# Patient Record
Sex: MALE | Race: WHITE | Employment: OTHER | URBAN - NONMETROPOLITAN AREA
[De-identification: names, ages, dates, MRNs, and addresses within clinical notes are randomized per-mention and may not be internally consistent; named-entity substitution may affect disease eponyms.]

---

## 2017-06-05 ENCOUNTER — HOSPITAL ENCOUNTER (INPATIENT)
Age: 57
LOS: 4 days | Discharge: HOME | DRG: 885 | End: 2017-06-09
Attending: EMERGENCY MEDICINE | Admitting: PSYCHIATRY & NEUROLOGY
Payer: MEDICARE

## 2017-06-05 DIAGNOSIS — R45.851 DEPRESSION WITH SUICIDAL IDEATION: Primary | ICD-10-CM

## 2017-06-05 DIAGNOSIS — F32.A DEPRESSION WITH SUICIDAL IDEATION: Primary | ICD-10-CM

## 2017-06-05 DIAGNOSIS — G89.29 OTHER CHRONIC PAIN: ICD-10-CM

## 2017-06-05 DIAGNOSIS — F10.10 ALCOHOL ABUSE: ICD-10-CM

## 2017-06-05 DIAGNOSIS — D72.829 LEUKOCYTOSIS, UNSPECIFIED TYPE: ICD-10-CM

## 2017-06-05 LAB
ALBUMIN SERPL-MCNC: 4.8 G/DL (ref 3.5–5.2)
ALP BLD-CCNC: 114 U/L (ref 40–130)
ALT SERPL-CCNC: 23 U/L (ref 5–41)
AMPHETAMINE SCREEN, URINE: NEGATIVE
ANION GAP SERPL CALCULATED.3IONS-SCNC: 19 MMOL/L (ref 7–19)
AST SERPL-CCNC: 25 U/L (ref 5–40)
BARBITURATE SCREEN URINE: NEGATIVE
BASOPHILS ABSOLUTE: 0.1 K/UL (ref 0–0.2)
BASOPHILS RELATIVE PERCENT: 0.3 % (ref 0–1)
BENZODIAZEPINE SCREEN, URINE: NEGATIVE
BILIRUB SERPL-MCNC: 0.3 MG/DL (ref 0.2–1.2)
BILIRUBIN URINE: ABNORMAL
BLOOD, URINE: NEGATIVE
BUN BLDV-MCNC: 17 MG/DL (ref 6–20)
CALCIUM SERPL-MCNC: 8.6 MG/DL (ref 8.6–10)
CANNABINOID SCREEN URINE: NEGATIVE
CASTS UA: ABNORMAL /LPF
CHLORIDE BLD-SCNC: 93 MMOL/L (ref 98–111)
CLARITY: CLEAR
CO2: 22 MMOL/L (ref 22–29)
COCAINE METABOLITE SCREEN URINE: NEGATIVE
COLOR: ABNORMAL
CREAT SERPL-MCNC: 1.1 MG/DL (ref 0.5–1.2)
EOSINOPHILS ABSOLUTE: 0 K/UL (ref 0–0.6)
EOSINOPHILS RELATIVE PERCENT: 0 % (ref 0–5)
EPITHELIAL CELLS, UA: ABNORMAL /HPF
ETHANOL: <10 MG/DL (ref 0–0.08)
GFR NON-AFRICAN AMERICAN: >60
GLUCOSE BLD-MCNC: 114 MG/DL (ref 74–109)
GLUCOSE URINE: NEGATIVE MG/DL
HCT VFR BLD CALC: 33.6 % (ref 42–52)
HEMOGLOBIN: 9.7 G/DL (ref 14–18)
KETONES, URINE: ABNORMAL MG/DL
LEUKOCYTE ESTERASE, URINE: NEGATIVE
LYMPHOCYTES ABSOLUTE: 2.6 K/UL (ref 1.1–4.5)
LYMPHOCYTES RELATIVE PERCENT: 17.3 % (ref 20–40)
Lab: ABNORMAL
MCH RBC QN AUTO: 19.5 PG (ref 27–31)
MCHC RBC AUTO-ENTMCNC: 28.9 G/DL (ref 33–37)
MCV RBC AUTO: 67.5 FL (ref 80–94)
MONOCYTES ABSOLUTE: 0.9 K/UL (ref 0–0.9)
MONOCYTES RELATIVE PERCENT: 6.2 % (ref 0–10)
NEUTROPHILS ABSOLUTE: 11.1 K/UL (ref 1.5–7.5)
NEUTROPHILS RELATIVE PERCENT: 75.2 % (ref 50–65)
NITRITE, URINE: NEGATIVE
OPIATE SCREEN URINE: POSITIVE
PDW BLD-RTO: 19.5 % (ref 11.5–14.5)
PH UA: 5.5
PLATELET # BLD: 566 K/UL (ref 130–400)
PMV BLD AUTO: 9.4 FL (ref 9.4–12.4)
POTASSIUM SERPL-SCNC: 4.3 MMOL/L (ref 3.5–5)
PROTEIN UA: 100 MG/DL
RBC # BLD: 4.98 M/UL (ref 4.7–6.1)
RBC UA: ABNORMAL /HPF (ref 0–2)
SODIUM BLD-SCNC: 134 MMOL/L (ref 136–145)
SPECIFIC GRAVITY UA: 1.04
TOTAL PROTEIN: 8.9 G/DL (ref 6.6–8.7)
UROBILINOGEN, URINE: 0.2 E.U./DL
WBC # BLD: 14.8 K/UL (ref 4.8–10.8)
WBC UA: ABNORMAL /HPF (ref 0–5)

## 2017-06-05 PROCEDURE — 80053 COMPREHEN METABOLIC PANEL: CPT

## 2017-06-05 PROCEDURE — G0480 DRUG TEST DEF 1-7 CLASSES: HCPCS

## 2017-06-05 PROCEDURE — 99284 EMERGENCY DEPT VISIT MOD MDM: CPT | Performed by: EMERGENCY MEDICINE

## 2017-06-05 PROCEDURE — 85025 COMPLETE CBC W/AUTO DIFF WBC: CPT

## 2017-06-05 PROCEDURE — 1240000000 HC EMOTIONAL WELLNESS R&B

## 2017-06-05 PROCEDURE — 6370000000 HC RX 637 (ALT 250 FOR IP): Performed by: PSYCHIATRY & NEUROLOGY

## 2017-06-05 PROCEDURE — 80307 DRUG TEST PRSMV CHEM ANLYZR: CPT

## 2017-06-05 PROCEDURE — 81001 URINALYSIS AUTO W/SCOPE: CPT

## 2017-06-05 PROCEDURE — 99285 EMERGENCY DEPT VISIT HI MDM: CPT

## 2017-06-05 PROCEDURE — 6370000000 HC RX 637 (ALT 250 FOR IP): Performed by: EMERGENCY MEDICINE

## 2017-06-05 PROCEDURE — 36415 COLL VENOUS BLD VENIPUNCTURE: CPT

## 2017-06-05 RX ORDER — HYDROCODONE BITARTRATE AND ACETAMINOPHEN 5; 325 MG/1; MG/1
2 TABLET ORAL ONCE
Status: COMPLETED | OUTPATIENT
Start: 2017-06-05 | End: 2017-06-05

## 2017-06-05 RX ORDER — CHLORDIAZEPOXIDE HYDROCHLORIDE 25 MG/1
25 CAPSULE, GELATIN COATED ORAL ONCE
Status: COMPLETED | OUTPATIENT
Start: 2017-06-05 | End: 2017-06-05

## 2017-06-05 RX ORDER — GABAPENTIN 300 MG/1
300 CAPSULE ORAL 3 TIMES DAILY
Status: ON HOLD | COMMUNITY
End: 2017-12-27 | Stop reason: HOSPADM

## 2017-06-05 RX ORDER — CLONIDINE HYDROCHLORIDE 0.1 MG/1
0.1 TABLET ORAL ONCE
Status: COMPLETED | OUTPATIENT
Start: 2017-06-05 | End: 2017-06-05

## 2017-06-05 RX ORDER — AMITRIPTYLINE HYDROCHLORIDE 10 MG/1
10 TABLET, FILM COATED ORAL 3 TIMES DAILY
Status: ON HOLD | COMMUNITY
End: 2017-12-27 | Stop reason: HOSPADM

## 2017-06-05 RX ORDER — BUSPIRONE HYDROCHLORIDE 15 MG/1
15 TABLET ORAL 2 TIMES DAILY
Status: DISCONTINUED | OUTPATIENT
Start: 2017-06-05 | End: 2017-06-07

## 2017-06-05 RX ORDER — PANTOPRAZOLE SODIUM 40 MG/1
40 TABLET, DELAYED RELEASE ORAL DAILY
Status: DISCONTINUED | OUTPATIENT
Start: 2017-06-06 | End: 2017-06-09 | Stop reason: HOSPADM

## 2017-06-05 RX ORDER — AMITRIPTYLINE HYDROCHLORIDE 10 MG/1
10 TABLET, FILM COATED ORAL 3 TIMES DAILY
Status: DISCONTINUED | OUTPATIENT
Start: 2017-06-05 | End: 2017-06-09 | Stop reason: HOSPADM

## 2017-06-05 RX ORDER — TEMAZEPAM 15 MG/1
15 CAPSULE ORAL NIGHTLY PRN
Status: ON HOLD | COMMUNITY
End: 2017-06-09 | Stop reason: HOSPADM

## 2017-06-05 RX ORDER — ALPRAZOLAM 1 MG/1
0.5 TABLET ORAL 4 TIMES DAILY PRN
Status: ON HOLD | COMMUNITY
End: 2017-08-22 | Stop reason: HOSPADM

## 2017-06-05 RX ORDER — ACETAMINOPHEN 325 MG/1
650 TABLET ORAL EVERY 4 HOURS PRN
Status: DISCONTINUED | OUTPATIENT
Start: 2017-06-05 | End: 2017-06-09 | Stop reason: HOSPADM

## 2017-06-05 RX ORDER — LORAZEPAM 1 MG/1
1 TABLET ORAL ONCE
Status: COMPLETED | OUTPATIENT
Start: 2017-06-05 | End: 2017-06-05

## 2017-06-05 RX ORDER — HYDROCODONE BITARTRATE AND ACETAMINOPHEN 10; 325 MG/1; MG/1
1 TABLET ORAL 3 TIMES DAILY PRN
Status: ON HOLD | COMMUNITY
End: 2017-08-22 | Stop reason: HOSPADM

## 2017-06-05 RX ORDER — LISINOPRIL 10 MG/1
40 TABLET ORAL ONCE
Status: DISCONTINUED | OUTPATIENT
Start: 2017-06-05 | End: 2017-06-05

## 2017-06-05 RX ORDER — NICOTINE 21 MG/24HR
1 PATCH, TRANSDERMAL 24 HOURS TRANSDERMAL DAILY
Status: DISCONTINUED | OUTPATIENT
Start: 2017-06-05 | End: 2017-06-09 | Stop reason: HOSPADM

## 2017-06-05 RX ORDER — GABAPENTIN 300 MG/1
300 CAPSULE ORAL 3 TIMES DAILY
Status: DISCONTINUED | OUTPATIENT
Start: 2017-06-05 | End: 2017-06-09 | Stop reason: HOSPADM

## 2017-06-05 RX ORDER — IBUPROFEN 600 MG/1
600 TABLET ORAL 3 TIMES DAILY
Status: DISCONTINUED | OUTPATIENT
Start: 2017-06-05 | End: 2017-06-06

## 2017-06-05 RX ORDER — HYDROCODONE BITARTRATE AND ACETAMINOPHEN 5; 325 MG/1; MG/1
1 TABLET ORAL 3 TIMES DAILY PRN
Status: DISCONTINUED | OUTPATIENT
Start: 2017-06-05 | End: 2017-06-06

## 2017-06-05 RX ORDER — DULOXETIN HYDROCHLORIDE 60 MG/1
60 CAPSULE, DELAYED RELEASE ORAL 2 TIMES DAILY
Status: DISCONTINUED | OUTPATIENT
Start: 2017-06-05 | End: 2017-06-09 | Stop reason: HOSPADM

## 2017-06-05 RX ORDER — CHLORDIAZEPOXIDE HYDROCHLORIDE 25 MG/1
50 CAPSULE, GELATIN COATED ORAL 4 TIMES DAILY
Status: DISCONTINUED | OUTPATIENT
Start: 2017-06-05 | End: 2017-06-06

## 2017-06-05 RX ORDER — LISINOPRIL 20 MG/1
40 TABLET ORAL DAILY
Status: DISCONTINUED | OUTPATIENT
Start: 2017-06-06 | End: 2017-06-09 | Stop reason: HOSPADM

## 2017-06-05 RX ORDER — ALPRAZOLAM 0.5 MG/1
1 TABLET ORAL 3 TIMES DAILY PRN
Status: DISCONTINUED | OUTPATIENT
Start: 2017-06-05 | End: 2017-06-06

## 2017-06-05 RX ADMIN — CHLORDIAZEPOXIDE HYDROCHLORIDE 25 MG: 25 CAPSULE ORAL at 12:19

## 2017-06-05 RX ADMIN — CHLORDIAZEPOXIDE HYDROCHLORIDE 50 MG: 25 CAPSULE ORAL at 21:25

## 2017-06-05 RX ADMIN — IBUPROFEN 600 MG: 600 TABLET, FILM COATED ORAL at 21:25

## 2017-06-05 RX ADMIN — DULOXETINE HYDROCHLORIDE 60 MG: 60 CAPSULE, DELAYED RELEASE ORAL at 21:25

## 2017-06-05 RX ADMIN — LORAZEPAM 1 MG: 1 TABLET ORAL at 14:52

## 2017-06-05 RX ADMIN — CLONIDINE HYDROCHLORIDE 0.1 MG: 0.1 TABLET ORAL at 12:19

## 2017-06-05 RX ADMIN — AMITRIPTYLINE HYDROCHLORIDE 10 MG: 10 TABLET, FILM COATED ORAL at 21:25

## 2017-06-05 RX ADMIN — QUETIAPINE FUMARATE 300 MG: 200 TABLET, FILM COATED ORAL at 21:26

## 2017-06-05 RX ADMIN — HYDROCODONE BITARTRATE AND ACETAMINOPHEN 2 TABLET: 5; 325 TABLET ORAL at 13:28

## 2017-06-05 RX ADMIN — BUSPIRONE HYDROCHLORIDE 15 MG: 15 TABLET ORAL at 21:25

## 2017-06-05 RX ADMIN — CLONIDINE HYDROCHLORIDE 0.1 MG: 0.1 TABLET ORAL at 14:11

## 2017-06-05 RX ADMIN — GABAPENTIN 300 MG: 300 CAPSULE ORAL at 21:26

## 2017-06-05 ASSESSMENT — SLEEP AND FATIGUE QUESTIONNAIRES
DO YOU USE A SLEEP AID: YES
AVERAGE NUMBER OF SLEEP HOURS: 2
DIFFICULTY ARISING: NO
DIFFICULTY STAYING ASLEEP: YES
DIFFICULTY FALLING ASLEEP: YES
DO YOU HAVE DIFFICULTY SLEEPING: YES
RESTFUL SLEEP: NO
SLEEP PATTERN: DIFFICULTY FALLING ASLEEP;DISTURBED/INTERRUPTED SLEEP;INSOMNIA;RESTLESSNESS

## 2017-06-05 ASSESSMENT — PATIENT HEALTH QUESTIONNAIRE - PHQ9: SUM OF ALL RESPONSES TO PHQ QUESTIONS 1-9: 16

## 2017-06-05 ASSESSMENT — PAIN DESCRIPTION - LOCATION: LOCATION: NECK

## 2017-06-05 ASSESSMENT — PAIN SCALES - GENERAL
PAINLEVEL_OUTOF10: 7
PAINLEVEL_OUTOF10: 8

## 2017-06-05 ASSESSMENT — ENCOUNTER SYMPTOMS
ABDOMINAL PAIN: 0
COUGH: 0

## 2017-06-05 ASSESSMENT — LIFESTYLE VARIABLES: HISTORY_ALCOHOL_USE: YES

## 2017-06-06 PROBLEM — F19.20 POLYSUBSTANCE (EXCLUDING OPIOIDS) DEPENDENCE, DAILY USE (HCC): Status: ACTIVE | Noted: 2017-06-06

## 2017-06-06 PROBLEM — G89.4 CHRONIC PAIN ASSOCIATED WITH SIGNIFICANT PSYCHOSOCIAL DYSFUNCTION: Status: ACTIVE | Noted: 2017-06-06

## 2017-06-06 PROBLEM — F32.2 MAJOR DEPRESS DIS, SEVERE: Status: ACTIVE | Noted: 2017-06-06

## 2017-06-06 PROCEDURE — 90792 PSYCH DIAG EVAL W/MED SRVCS: CPT | Performed by: PSYCHIATRY & NEUROLOGY

## 2017-06-06 PROCEDURE — 1240000000 HC EMOTIONAL WELLNESS R&B

## 2017-06-06 PROCEDURE — 6370000000 HC RX 637 (ALT 250 FOR IP): Performed by: PSYCHIATRY & NEUROLOGY

## 2017-06-06 RX ORDER — IBUPROFEN 600 MG/1
600 TABLET ORAL EVERY 8 HOURS PRN
Status: DISCONTINUED | OUTPATIENT
Start: 2017-06-06 | End: 2017-06-07

## 2017-06-06 RX ORDER — HYDROCODONE BITARTRATE AND ACETAMINOPHEN 5; 325 MG/1; MG/1
1 TABLET ORAL 3 TIMES DAILY
Status: DISCONTINUED | OUTPATIENT
Start: 2017-06-07 | End: 2017-06-09 | Stop reason: HOSPADM

## 2017-06-06 RX ORDER — CHLORDIAZEPOXIDE HYDROCHLORIDE 25 MG/1
50 CAPSULE, GELATIN COATED ORAL 3 TIMES DAILY
Status: DISCONTINUED | OUTPATIENT
Start: 2017-06-06 | End: 2017-06-07

## 2017-06-06 RX ADMIN — CHLORDIAZEPOXIDE HYDROCHLORIDE 50 MG: 25 CAPSULE ORAL at 09:23

## 2017-06-06 RX ADMIN — DULOXETINE HYDROCHLORIDE 60 MG: 60 CAPSULE, DELAYED RELEASE ORAL at 21:33

## 2017-06-06 RX ADMIN — ALPRAZOLAM 1 MG: 0.5 TABLET ORAL at 18:05

## 2017-06-06 RX ADMIN — ALPRAZOLAM 1 MG: 0.5 TABLET ORAL at 09:23

## 2017-06-06 RX ADMIN — BUSPIRONE HYDROCHLORIDE 15 MG: 15 TABLET ORAL at 21:33

## 2017-06-06 RX ADMIN — HYDROCODONE BITARTRATE AND ACETAMINOPHEN 1 TABLET: 5; 325 TABLET ORAL at 17:00

## 2017-06-06 RX ADMIN — GABAPENTIN 300 MG: 300 CAPSULE ORAL at 09:22

## 2017-06-06 RX ADMIN — AMITRIPTYLINE HYDROCHLORIDE 10 MG: 10 TABLET, FILM COATED ORAL at 13:44

## 2017-06-06 RX ADMIN — CHLORDIAZEPOXIDE HYDROCHLORIDE 50 MG: 25 CAPSULE ORAL at 16:04

## 2017-06-06 RX ADMIN — IBUPROFEN 600 MG: 600 TABLET, FILM COATED ORAL at 21:33

## 2017-06-06 RX ADMIN — AMITRIPTYLINE HYDROCHLORIDE 10 MG: 10 TABLET, FILM COATED ORAL at 21:33

## 2017-06-06 RX ADMIN — DULOXETINE HYDROCHLORIDE 60 MG: 60 CAPSULE, DELAYED RELEASE ORAL at 09:22

## 2017-06-06 RX ADMIN — LISINOPRIL 40 MG: 20 TABLET ORAL at 09:22

## 2017-06-06 RX ADMIN — GABAPENTIN 300 MG: 300 CAPSULE ORAL at 13:44

## 2017-06-06 RX ADMIN — BUSPIRONE HYDROCHLORIDE 15 MG: 15 TABLET ORAL at 09:22

## 2017-06-06 RX ADMIN — CHLORDIAZEPOXIDE HYDROCHLORIDE 50 MG: 25 CAPSULE ORAL at 13:44

## 2017-06-06 RX ADMIN — HYDROCODONE BITARTRATE AND ACETAMINOPHEN 1 TABLET: 5; 325 TABLET ORAL at 09:22

## 2017-06-06 RX ADMIN — ACETAMINOPHEN 650 MG: 325 TABLET, FILM COATED ORAL at 07:19

## 2017-06-06 RX ADMIN — GABAPENTIN 300 MG: 300 CAPSULE ORAL at 21:33

## 2017-06-06 RX ADMIN — AMITRIPTYLINE HYDROCHLORIDE 10 MG: 10 TABLET, FILM COATED ORAL at 09:22

## 2017-06-06 RX ADMIN — QUETIAPINE FUMARATE 300 MG: 200 TABLET, FILM COATED ORAL at 21:33

## 2017-06-06 RX ADMIN — PANTOPRAZOLE SODIUM 40 MG: 40 TABLET, DELAYED RELEASE ORAL at 09:22

## 2017-06-06 RX ADMIN — IBUPROFEN 600 MG: 600 TABLET, FILM COATED ORAL at 09:23

## 2017-06-06 RX ADMIN — IBUPROFEN 600 MG: 600 TABLET, FILM COATED ORAL at 13:44

## 2017-06-06 ASSESSMENT — PAIN SCALES - GENERAL
PAINLEVEL_OUTOF10: 7
PAINLEVEL_OUTOF10: 8
PAINLEVEL_OUTOF10: 7
PAINLEVEL_OUTOF10: 6
PAINLEVEL_OUTOF10: 7
PAINLEVEL_OUTOF10: 7
PAINLEVEL_OUTOF10: 3
PAINLEVEL_OUTOF10: 6
PAINLEVEL_OUTOF10: 8

## 2017-06-07 LAB
BASOPHILS ABSOLUTE: 0 K/UL (ref 0–0.2)
BASOPHILS RELATIVE PERCENT: 0.4 % (ref 0–1)
EOSINOPHILS ABSOLUTE: 0.1 K/UL (ref 0–0.6)
EOSINOPHILS RELATIVE PERCENT: 1.4 % (ref 0–5)
FERRITIN: 9.9 NG/ML (ref 30–400)
HBA1C MFR BLD: 6.3 %
HCT VFR BLD CALC: 29.8 % (ref 42–52)
HCT VFR BLD CALC: 30.3 % (ref 42–52)
HEMOGLOBIN: 8.4 G/DL (ref 14–18)
HEMOGLOBIN: 8.4 G/DL (ref 14–18)
IRON SATURATION: 7 % (ref 14–50)
IRON: 35 UG/DL (ref 59–158)
LYMPHOCYTES ABSOLUTE: 2.1 K/UL (ref 1.1–4.5)
LYMPHOCYTES RELATIVE PERCENT: 21.2 % (ref 20–40)
MCH RBC QN AUTO: 19.7 PG (ref 27–31)
MCHC RBC AUTO-ENTMCNC: 28.2 G/DL (ref 33–37)
MCV RBC AUTO: 69.8 FL (ref 80–94)
MONOCYTES ABSOLUTE: 0.8 K/UL (ref 0–0.9)
MONOCYTES RELATIVE PERCENT: 7.5 % (ref 0–10)
NEUTROPHILS ABSOLUTE: 6.9 K/UL (ref 1.5–7.5)
NEUTROPHILS RELATIVE PERCENT: 69 % (ref 50–65)
PDW BLD-RTO: 18.5 % (ref 11.5–14.5)
PLATELET # BLD: 324 K/UL (ref 130–400)
PMV BLD AUTO: 9.9 FL (ref 9.4–12.4)
RBC # BLD: 4.27 M/UL (ref 4.7–6.1)
RETICULOCYTE ABSOLUTE COUNT: 0.1 M/UL (ref 0.03–0.12)
RETICULOCYTE COUNT PCT: 2.29 % (ref 0.5–1.5)
TOTAL IRON BINDING CAPACITY: 489 UG/DL (ref 250–400)
VITAMIN B-12: 277 PG/ML (ref 211–946)
VITAMIN D 25-HYDROXY: 19.5 NG/ML
WBC # BLD: 10 K/UL (ref 4.8–10.8)

## 2017-06-07 PROCEDURE — 83036 HEMOGLOBIN GLYCOSYLATED A1C: CPT

## 2017-06-07 PROCEDURE — 1240000000 HC EMOTIONAL WELLNESS R&B

## 2017-06-07 PROCEDURE — 82306 VITAMIN D 25 HYDROXY: CPT

## 2017-06-07 PROCEDURE — 83540 ASSAY OF IRON: CPT

## 2017-06-07 PROCEDURE — 85025 COMPLETE CBC W/AUTO DIFF WBC: CPT

## 2017-06-07 PROCEDURE — 82607 VITAMIN B-12: CPT

## 2017-06-07 PROCEDURE — 83550 IRON BINDING TEST: CPT

## 2017-06-07 PROCEDURE — 6370000000 HC RX 637 (ALT 250 FOR IP): Performed by: PSYCHIATRY & NEUROLOGY

## 2017-06-07 PROCEDURE — 99233 SBSQ HOSP IP/OBS HIGH 50: CPT | Performed by: PSYCHIATRY & NEUROLOGY

## 2017-06-07 PROCEDURE — 6370000000 HC RX 637 (ALT 250 FOR IP): Performed by: FAMILY MEDICINE

## 2017-06-07 PROCEDURE — 85018 HEMOGLOBIN: CPT

## 2017-06-07 PROCEDURE — 85014 HEMATOCRIT: CPT

## 2017-06-07 PROCEDURE — 82728 ASSAY OF FERRITIN: CPT

## 2017-06-07 PROCEDURE — 36415 COLL VENOUS BLD VENIPUNCTURE: CPT

## 2017-06-07 PROCEDURE — 85045 AUTOMATED RETICULOCYTE COUNT: CPT

## 2017-06-07 RX ORDER — ERGOCALCIFEROL 1.25 MG/1
50000 CAPSULE ORAL WEEKLY
Status: DISCONTINUED | OUTPATIENT
Start: 2017-06-07 | End: 2017-06-09 | Stop reason: HOSPADM

## 2017-06-07 RX ORDER — ALPRAZOLAM 0.5 MG/1
1 TABLET ORAL 3 TIMES DAILY
Status: DISCONTINUED | OUTPATIENT
Start: 2017-06-07 | End: 2017-06-09 | Stop reason: HOSPADM

## 2017-06-07 RX ORDER — FERROUS SULFATE 325(65) MG
325 TABLET ORAL 2 TIMES DAILY WITH MEALS
Status: DISCONTINUED | OUTPATIENT
Start: 2017-06-07 | End: 2017-06-09 | Stop reason: HOSPADM

## 2017-06-07 RX ADMIN — GABAPENTIN 300 MG: 300 CAPSULE ORAL at 09:22

## 2017-06-07 RX ADMIN — GABAPENTIN 300 MG: 300 CAPSULE ORAL at 13:37

## 2017-06-07 RX ADMIN — FERROUS SULFATE TAB 325 MG (65 MG ELEMENTAL FE) 325 MG: 325 (65 FE) TAB at 10:42

## 2017-06-07 RX ADMIN — PANTOPRAZOLE SODIUM 40 MG: 40 TABLET, DELAYED RELEASE ORAL at 09:22

## 2017-06-07 RX ADMIN — ACETAMINOPHEN 650 MG: 325 TABLET, FILM COATED ORAL at 06:32

## 2017-06-07 RX ADMIN — QUETIAPINE FUMARATE 300 MG: 200 TABLET, FILM COATED ORAL at 21:36

## 2017-06-07 RX ADMIN — ACETAMINOPHEN 650 MG: 325 TABLET, FILM COATED ORAL at 16:57

## 2017-06-07 RX ADMIN — LISINOPRIL 40 MG: 20 TABLET ORAL at 09:21

## 2017-06-07 RX ADMIN — CHLORDIAZEPOXIDE HYDROCHLORIDE 50 MG: 25 CAPSULE ORAL at 09:21

## 2017-06-07 RX ADMIN — AMITRIPTYLINE HYDROCHLORIDE 10 MG: 10 TABLET, FILM COATED ORAL at 21:37

## 2017-06-07 RX ADMIN — ALPRAZOLAM 1 MG: 0.5 TABLET ORAL at 16:33

## 2017-06-07 RX ADMIN — GABAPENTIN 300 MG: 300 CAPSULE ORAL at 21:37

## 2017-06-07 RX ADMIN — AMITRIPTYLINE HYDROCHLORIDE 10 MG: 10 TABLET, FILM COATED ORAL at 13:36

## 2017-06-07 RX ADMIN — ALPRAZOLAM 1 MG: 0.5 TABLET ORAL at 21:36

## 2017-06-07 RX ADMIN — BUSPIRONE HYDROCHLORIDE 15 MG: 15 TABLET ORAL at 09:22

## 2017-06-07 RX ADMIN — DULOXETINE HYDROCHLORIDE 60 MG: 60 CAPSULE, DELAYED RELEASE ORAL at 21:37

## 2017-06-07 RX ADMIN — HYDROCODONE BITARTRATE AND ACETAMINOPHEN 1 TABLET: 5; 325 TABLET ORAL at 09:22

## 2017-06-07 RX ADMIN — AMITRIPTYLINE HYDROCHLORIDE 10 MG: 10 TABLET, FILM COATED ORAL at 09:22

## 2017-06-07 RX ADMIN — FERROUS SULFATE TAB 325 MG (65 MG ELEMENTAL FE) 325 MG: 325 (65 FE) TAB at 16:33

## 2017-06-07 RX ADMIN — DULOXETINE HYDROCHLORIDE 60 MG: 60 CAPSULE, DELAYED RELEASE ORAL at 09:22

## 2017-06-07 RX ADMIN — HYDROCODONE BITARTRATE AND ACETAMINOPHEN 1 TABLET: 5; 325 TABLET ORAL at 21:36

## 2017-06-07 RX ADMIN — ERGOCALCIFEROL 50000 UNITS: 1.25 CAPSULE ORAL at 10:42

## 2017-06-07 RX ADMIN — HYDROCODONE BITARTRATE AND ACETAMINOPHEN 1 TABLET: 5; 325 TABLET ORAL at 13:36

## 2017-06-07 ASSESSMENT — PAIN SCALES - GENERAL
PAINLEVEL_OUTOF10: 5
PAINLEVEL_OUTOF10: 5
PAINLEVEL_OUTOF10: 7
PAINLEVEL_OUTOF10: 7
PAINLEVEL_OUTOF10: 8
PAINLEVEL_OUTOF10: 7
PAINLEVEL_OUTOF10: 8
PAINLEVEL_OUTOF10: 7

## 2017-06-08 LAB
HCT VFR BLD CALC: 29.5 % (ref 42–52)
HEMOGLOBIN: 8.1 G/DL (ref 14–18)

## 2017-06-08 PROCEDURE — 6370000000 HC RX 637 (ALT 250 FOR IP): Performed by: PSYCHIATRY & NEUROLOGY

## 2017-06-08 PROCEDURE — 6370000000 HC RX 637 (ALT 250 FOR IP): Performed by: FAMILY MEDICINE

## 2017-06-08 PROCEDURE — 99231 SBSQ HOSP IP/OBS SF/LOW 25: CPT | Performed by: PSYCHIATRY & NEUROLOGY

## 2017-06-08 PROCEDURE — 85014 HEMATOCRIT: CPT

## 2017-06-08 PROCEDURE — 1240000000 HC EMOTIONAL WELLNESS R&B

## 2017-06-08 PROCEDURE — 85018 HEMOGLOBIN: CPT

## 2017-06-08 PROCEDURE — 36415 COLL VENOUS BLD VENIPUNCTURE: CPT

## 2017-06-08 RX ORDER — CALCIUM CARBONATE 200(500)MG
500 TABLET,CHEWABLE ORAL 3 TIMES DAILY PRN
Status: DISCONTINUED | OUTPATIENT
Start: 2017-06-08 | End: 2017-06-09 | Stop reason: HOSPADM

## 2017-06-08 RX ORDER — MAGNESIUM HYDROXIDE/ALUMINUM HYDROXICE/SIMETHICONE 120; 1200; 1200 MG/30ML; MG/30ML; MG/30ML
30 SUSPENSION ORAL EVERY 6 HOURS PRN
Status: DISCONTINUED | OUTPATIENT
Start: 2017-06-08 | End: 2017-06-09 | Stop reason: HOSPADM

## 2017-06-08 RX ADMIN — HYDROCODONE BITARTRATE AND ACETAMINOPHEN 1 TABLET: 5; 325 TABLET ORAL at 20:51

## 2017-06-08 RX ADMIN — GABAPENTIN 300 MG: 300 CAPSULE ORAL at 14:05

## 2017-06-08 RX ADMIN — HYDROCODONE BITARTRATE AND ACETAMINOPHEN 1 TABLET: 5; 325 TABLET ORAL at 14:05

## 2017-06-08 RX ADMIN — FERROUS SULFATE TAB 325 MG (65 MG ELEMENTAL FE) 325 MG: 325 (65 FE) TAB at 17:26

## 2017-06-08 RX ADMIN — ALPRAZOLAM 1 MG: 0.5 TABLET ORAL at 14:05

## 2017-06-08 RX ADMIN — GABAPENTIN 300 MG: 300 CAPSULE ORAL at 20:51

## 2017-06-08 RX ADMIN — ALPRAZOLAM 1 MG: 0.5 TABLET ORAL at 09:09

## 2017-06-08 RX ADMIN — LISINOPRIL 40 MG: 20 TABLET ORAL at 09:09

## 2017-06-08 RX ADMIN — FERROUS SULFATE TAB 325 MG (65 MG ELEMENTAL FE) 325 MG: 325 (65 FE) TAB at 09:09

## 2017-06-08 RX ADMIN — CALCIUM CARBONATE (ANTACID) CHEW TAB 500 MG 500 MG: 500 CHEW TAB at 20:50

## 2017-06-08 RX ADMIN — AMITRIPTYLINE HYDROCHLORIDE 10 MG: 10 TABLET, FILM COATED ORAL at 14:05

## 2017-06-08 RX ADMIN — AMITRIPTYLINE HYDROCHLORIDE 10 MG: 10 TABLET, FILM COATED ORAL at 09:09

## 2017-06-08 RX ADMIN — ALPRAZOLAM 1 MG: 0.5 TABLET ORAL at 20:50

## 2017-06-08 RX ADMIN — ALUMINUM HYDROXIDE, MAGNESIUM HYDROXIDE, AND SIMETHICONE 30 ML: 200; 200; 20 SUSPENSION ORAL at 22:22

## 2017-06-08 RX ADMIN — PANTOPRAZOLE SODIUM 40 MG: 40 TABLET, DELAYED RELEASE ORAL at 09:09

## 2017-06-08 RX ADMIN — ACETAMINOPHEN 650 MG: 325 TABLET, FILM COATED ORAL at 07:15

## 2017-06-08 RX ADMIN — AMITRIPTYLINE HYDROCHLORIDE 10 MG: 10 TABLET, FILM COATED ORAL at 20:51

## 2017-06-08 RX ADMIN — HYDROCODONE BITARTRATE AND ACETAMINOPHEN 1 TABLET: 5; 325 TABLET ORAL at 09:09

## 2017-06-08 RX ADMIN — QUETIAPINE FUMARATE 300 MG: 200 TABLET, FILM COATED ORAL at 20:51

## 2017-06-08 RX ADMIN — DULOXETINE HYDROCHLORIDE 60 MG: 60 CAPSULE, DELAYED RELEASE ORAL at 09:09

## 2017-06-08 RX ADMIN — DULOXETINE HYDROCHLORIDE 60 MG: 60 CAPSULE, DELAYED RELEASE ORAL at 20:51

## 2017-06-08 RX ADMIN — GABAPENTIN 300 MG: 300 CAPSULE ORAL at 09:09

## 2017-06-08 RX ADMIN — ACETAMINOPHEN 650 MG: 325 TABLET, FILM COATED ORAL at 16:28

## 2017-06-08 ASSESSMENT — PAIN SCALES - GENERAL
PAINLEVEL_OUTOF10: 8
PAINLEVEL_OUTOF10: 3
PAINLEVEL_OUTOF10: 8
PAINLEVEL_OUTOF10: 5
PAINLEVEL_OUTOF10: 8
PAINLEVEL_OUTOF10: 8
PAINLEVEL_OUTOF10: 4

## 2017-06-09 VITALS
WEIGHT: 224 LBS | DIASTOLIC BLOOD PRESSURE: 73 MMHG | RESPIRATION RATE: 20 BRPM | HEIGHT: 73 IN | OXYGEN SATURATION: 98 % | TEMPERATURE: 97.8 F | BODY MASS INDEX: 29.69 KG/M2 | HEART RATE: 88 BPM | SYSTOLIC BLOOD PRESSURE: 118 MMHG

## 2017-06-09 LAB
HCT VFR BLD CALC: 31.3 % (ref 42–52)
HEMOGLOBIN: 8.7 G/DL (ref 14–18)
MCH RBC QN AUTO: 19.6 PG (ref 27–31)
MCHC RBC AUTO-ENTMCNC: 27.8 G/DL (ref 33–37)
MCV RBC AUTO: 70.7 FL (ref 80–94)
PDW BLD-RTO: 19.1 % (ref 11.5–14.5)
PLATELET # BLD: 365 K/UL (ref 130–400)
PMV BLD AUTO: 10 FL (ref 9.4–12.4)
RBC # BLD: 4.43 M/UL (ref 4.7–6.1)
WBC # BLD: 6.5 K/UL (ref 4.8–10.8)

## 2017-06-09 PROCEDURE — 36415 COLL VENOUS BLD VENIPUNCTURE: CPT

## 2017-06-09 PROCEDURE — 6370000000 HC RX 637 (ALT 250 FOR IP): Performed by: PSYCHIATRY & NEUROLOGY

## 2017-06-09 PROCEDURE — 99239 HOSP IP/OBS DSCHRG MGMT >30: CPT | Performed by: PSYCHIATRY & NEUROLOGY

## 2017-06-09 PROCEDURE — 85027 COMPLETE CBC AUTOMATED: CPT

## 2017-06-09 PROCEDURE — 6370000000 HC RX 637 (ALT 250 FOR IP): Performed by: FAMILY MEDICINE

## 2017-06-09 PROCEDURE — 5130000000 HC BRIDGE APPOINTMENT

## 2017-06-09 RX ORDER — FERROUS SULFATE 325(65) MG
325 TABLET ORAL 2 TIMES DAILY WITH MEALS
Qty: 60 TABLET | Refills: 0 | Status: SHIPPED | OUTPATIENT
Start: 2017-06-09 | End: 2017-12-26

## 2017-06-09 RX ORDER — M-VIT,TX,IRON,MINS/CALC/FOLIC 27MG-0.4MG
1 TABLET ORAL DAILY
Qty: 30 TABLET | Refills: 0 | Status: SHIPPED | OUTPATIENT
Start: 2017-06-09 | End: 2017-12-26

## 2017-06-09 RX ORDER — ERGOCALCIFEROL (VITAMIN D2) 1250 MCG
50000 CAPSULE ORAL WEEKLY
Qty: 12 CAPSULE | Refills: 0 | Status: SHIPPED | OUTPATIENT
Start: 2017-06-09 | End: 2017-12-26

## 2017-06-09 RX ORDER — VARENICLINE TARTRATE 0.5 MG/1
1 TABLET, FILM COATED ORAL 2 TIMES DAILY WITH MEALS
Status: DISCONTINUED | OUTPATIENT
Start: 2017-06-09 | End: 2017-06-09 | Stop reason: HOSPADM

## 2017-06-09 RX ORDER — VARENICLINE TARTRATE 1 MG/1
1 TABLET, FILM COATED ORAL 2 TIMES DAILY WITH MEALS
Qty: 60 TABLET | Refills: 0 | Status: SHIPPED | OUTPATIENT
Start: 2017-06-09 | End: 2017-12-26

## 2017-06-09 RX ADMIN — DULOXETINE HYDROCHLORIDE 60 MG: 60 CAPSULE, DELAYED RELEASE ORAL at 08:38

## 2017-06-09 RX ADMIN — ACETAMINOPHEN 650 MG: 325 TABLET, FILM COATED ORAL at 05:49

## 2017-06-09 RX ADMIN — ALPRAZOLAM 1 MG: 0.5 TABLET ORAL at 08:37

## 2017-06-09 RX ADMIN — AMITRIPTYLINE HYDROCHLORIDE 10 MG: 10 TABLET, FILM COATED ORAL at 08:37

## 2017-06-09 RX ADMIN — GABAPENTIN 300 MG: 300 CAPSULE ORAL at 14:05

## 2017-06-09 RX ADMIN — GABAPENTIN 300 MG: 300 CAPSULE ORAL at 08:38

## 2017-06-09 RX ADMIN — VARENICLINE TARTRATE 1 MG: 0.5 TABLET, FILM COATED ORAL at 11:55

## 2017-06-09 RX ADMIN — AMITRIPTYLINE HYDROCHLORIDE 10 MG: 10 TABLET, FILM COATED ORAL at 14:05

## 2017-06-09 RX ADMIN — ALPRAZOLAM 1 MG: 0.5 TABLET ORAL at 14:05

## 2017-06-09 RX ADMIN — LISINOPRIL 40 MG: 20 TABLET ORAL at 08:38

## 2017-06-09 RX ADMIN — PANTOPRAZOLE SODIUM 40 MG: 40 TABLET, DELAYED RELEASE ORAL at 08:37

## 2017-06-09 RX ADMIN — CALCIUM CARBONATE (ANTACID) CHEW TAB 500 MG 500 MG: 500 CHEW TAB at 08:38

## 2017-06-09 RX ADMIN — FERROUS SULFATE TAB 325 MG (65 MG ELEMENTAL FE) 325 MG: 325 (65 FE) TAB at 08:38

## 2017-06-09 RX ADMIN — HYDROCODONE BITARTRATE AND ACETAMINOPHEN 1 TABLET: 5; 325 TABLET ORAL at 08:37

## 2017-06-09 RX ADMIN — HYDROCODONE BITARTRATE AND ACETAMINOPHEN 1 TABLET: 5; 325 TABLET ORAL at 14:05

## 2017-06-09 ASSESSMENT — PAIN SCALES - GENERAL
PAINLEVEL_OUTOF10: 8
PAINLEVEL_OUTOF10: 6
PAINLEVEL_OUTOF10: 8

## 2017-06-10 ENCOUNTER — TELEPHONE (OUTPATIENT)
Dept: URGENT CARE | Facility: CLINIC | Age: 57
End: 2017-06-10

## 2017-06-10 NOTE — TELEPHONE ENCOUNTER
Patient called requesting medication and was not making sense what so ever, he was saying he couldn't pick them up because they were not ready to be filled yet I asked if he was taking more than he should he said no, he said he went to behavioral health yesterday i told him to call them and see what they can do other wise follow up with his pcp we made him an appt with or go to er per elizabeth. On his discharge that day he was seen here he had an appt with dr coronado.  He said he will call behavorial health and see if they can refill them.

## 2017-06-13 ENCOUNTER — APPOINTMENT (OUTPATIENT)
Dept: GENERAL RADIOLOGY | Facility: HOSPITAL | Age: 57
End: 2017-06-13

## 2017-06-13 ENCOUNTER — APPOINTMENT (OUTPATIENT)
Dept: CT IMAGING | Facility: HOSPITAL | Age: 57
End: 2017-06-13

## 2017-06-13 ENCOUNTER — HOSPITAL ENCOUNTER (INPATIENT)
Facility: HOSPITAL | Age: 57
LOS: 11 days | Discharge: PSYCHIATRIC OR SUBSTANCE USE FACILITY | End: 2017-06-24
Attending: EMERGENCY MEDICINE | Admitting: INTERNAL MEDICINE

## 2017-06-13 ENCOUNTER — APPOINTMENT (OUTPATIENT)
Dept: NUCLEAR MEDICINE | Facility: HOSPITAL | Age: 57
End: 2017-06-13

## 2017-06-13 DIAGNOSIS — E87.5 HYPERKALEMIA: ICD-10-CM

## 2017-06-13 DIAGNOSIS — R09.02 HYPOXIA: ICD-10-CM

## 2017-06-13 DIAGNOSIS — N17.9 ACUTE RENAL FAILURE, UNSPECIFIED ACUTE RENAL FAILURE TYPE (HCC): Primary | ICD-10-CM

## 2017-06-13 DIAGNOSIS — T50.904A OVERDOSE, UNDETERMINED INTENT, INITIAL ENCOUNTER: ICD-10-CM

## 2017-06-13 DIAGNOSIS — R13.12 OROPHARYNGEAL DYSPHAGIA: ICD-10-CM

## 2017-06-13 PROBLEM — R41.82 ALTERED MENTAL STATUS: Status: ACTIVE | Noted: 2017-06-13

## 2017-06-13 LAB
ALBUMIN SERPL-MCNC: 3.8 G/DL (ref 3.5–5)
ALBUMIN/GLOB SERPL: 1.2 G/DL (ref 1.1–2.5)
ALP SERPL-CCNC: 83 U/L (ref 24–120)
ALT SERPL W P-5'-P-CCNC: 49 U/L (ref 0–54)
AMMONIA BLD-SCNC: <9 UMOL/L (ref 9–33)
AMPHET+METHAMPHET UR QL: NEGATIVE
AMYLASE SERPL-CCNC: 95 U/L (ref 30–110)
ANION GAP SERPL CALCULATED.3IONS-SCNC: 14 MMOL/L (ref 4–13)
APTT PPP: 27.2 SECONDS (ref 24.1–34.8)
ARTERIAL PATENCY WRIST A: ABNORMAL
AST SERPL-CCNC: 61 U/L (ref 7–45)
ATMOSPHERIC PRESS: ABNORMAL MMHG
BARBITURATES UR QL SCN: NEGATIVE
BASE EXCESS BLDA CALC-SCNC: -4.6 MMOL/L (ref -2–2)
BASOPHILS # BLD AUTO: 0.02 10*3/MM3 (ref 0–0.2)
BASOPHILS NFR BLD AUTO: 0.2 % (ref 0–2)
BDY SITE: ABNORMAL
BENZODIAZ UR QL SCN: POSITIVE
BILIRUB SERPL-MCNC: 0.5 MG/DL (ref 0.1–1)
BILIRUB UR QL STRIP: NEGATIVE
BUN BLD-MCNC: 79 MG/DL (ref 5–21)
BUN/CREAT SERPL: 23.2 (ref 7–25)
CALCIUM SPEC-SCNC: 7.9 MG/DL (ref 8.4–10.4)
CANNABINOIDS SERPL QL: NEGATIVE
CHLORIDE SERPL-SCNC: 105 MMOL/L (ref 98–110)
CLARITY UR: CLEAR
CO2 SERPL-SCNC: 22 MMOL/L (ref 24–31)
COCAINE UR QL: NEGATIVE
COLOR UR: YELLOW
CREAT BLD-MCNC: 3.41 MG/DL (ref 0.5–1.4)
D DIMER PPP FEU-MCNC: 2.22 MG/L (FEU) (ref 0–0.5)
D-LACTATE SERPL-SCNC: 0.6 MMOL/L (ref 0.5–2)
DEPRECATED RDW RBC AUTO: 46.4 FL (ref 40–54)
EOSINOPHIL # BLD AUTO: 0.12 10*3/MM3 (ref 0–0.7)
EOSINOPHIL NFR BLD AUTO: 1 % (ref 0–4)
ERYTHROCYTE [DISTWIDTH] IN BLOOD BY AUTOMATED COUNT: 21 % (ref 12–15)
ETHANOL UR QL: <0.01 %
GAS FLOW AIRWAY: 2 LPM
GFR SERPL CREATININE-BSD FRML MDRD: 19 ML/MIN/1.73
GLOBULIN UR ELPH-MCNC: 3.3 GM/DL
GLUCOSE BLD-MCNC: 97 MG/DL (ref 70–100)
GLUCOSE UR STRIP-MCNC: NEGATIVE MG/DL
HCO3 BLDA-SCNC: 20.5 MMOL/L (ref 22–26)
HCT VFR BLD AUTO: 24.4 % (ref 40–52)
HGB BLD-MCNC: 7.2 G/DL (ref 14–18)
HGB UR QL STRIP.AUTO: NEGATIVE
HOLD SPECIMEN: NORMAL
HYPOCHROMIA BLD QL: NORMAL
IMM GRANULOCYTES # BLD: 0.12 10*3/MM3 (ref 0–0.03)
IMM GRANULOCYTES NFR BLD: 1 % (ref 0–5)
INR PPP: 1.09 (ref 0.91–1.09)
KETONES UR QL STRIP: NEGATIVE
LEUKOCYTE ESTERASE UR QL STRIP.AUTO: NEGATIVE
LIPASE SERPL-CCNC: 51 U/L (ref 23–203)
LYMPHOCYTES # BLD AUTO: 1.51 10*3/MM3 (ref 0.72–4.86)
LYMPHOCYTES NFR BLD AUTO: 12.3 % (ref 15–45)
MAGNESIUM SERPL-MCNC: 2.4 MG/DL (ref 1.4–2.2)
MCH RBC QN AUTO: 20.2 PG (ref 28–32)
MCHC RBC AUTO-ENTMCNC: 29.5 G/DL (ref 33–36)
MCV RBC AUTO: 68.3 FL (ref 82–95)
METHADONE UR QL SCN: NEGATIVE
MICROCYTES BLD QL: NORMAL
MODALITY: ABNORMAL
MONOCYTES # BLD AUTO: 0.83 10*3/MM3 (ref 0.19–1.3)
MONOCYTES NFR BLD AUTO: 6.8 % (ref 4–12)
NEUTROPHILS # BLD AUTO: 9.66 10*3/MM3 (ref 1.87–8.4)
NEUTROPHILS NFR BLD AUTO: 78.7 % (ref 39–78)
NITRITE UR QL STRIP: NEGATIVE
NRBC BLD MANUAL-RTO: 0 /100 WBC (ref 0–0)
NT-PROBNP SERPL-MCNC: 1580 PG/ML (ref 0–900)
OPIATES UR QL: POSITIVE
PCO2 BLDA: 38.5 MM HG (ref 35–45)
PCP UR QL SCN: NEGATIVE
PH BLDA: 7.34 PH UNITS (ref 7.35–7.45)
PH UR STRIP.AUTO: <=5 [PH] (ref 5–8)
PLATELET # BLD AUTO: 277 10*3/MM3 (ref 130–400)
PMV BLD AUTO: 9.5 FL (ref 6–12)
PO2 BLDA: 78 MM HG (ref 80–100)
POIKILOCYTOSIS BLD QL SMEAR: NORMAL
POTASSIUM BLD-SCNC: 6.7 MMOL/L (ref 3.5–5.3)
PROT SERPL-MCNC: 7.1 G/DL (ref 6.3–8.7)
PROT UR QL STRIP: ABNORMAL
PROTHROMBIN TIME: 14.5 SECONDS (ref 11.9–14.6)
RBC # BLD AUTO: 3.57 10*6/MM3 (ref 4.8–5.9)
SAO2 % BLDCOA: 95 % (ref 94–100)
SAO2 % BLDCOA: 95 % (ref 94–100)
SMALL PLATELETS BLD QL SMEAR: ADEQUATE
SODIUM BLD-SCNC: 141 MMOL/L (ref 135–145)
SP GR UR STRIP: 1.02 (ref 1–1.03)
TROPONIN I SERPL-MCNC: <0.012 NG/ML (ref 0–0.03)
TSH SERPL DL<=0.05 MIU/L-ACNC: 1.33 MIU/ML (ref 0.47–4.68)
UROBILINOGEN UR QL STRIP: ABNORMAL
WBC MORPH BLD: NORMAL
WBC NRBC COR # BLD: 12.26 10*3/MM3 (ref 4.8–10.8)
WHOLE BLOOD HOLD SPECIMEN: NORMAL
WHOLE BLOOD HOLD SPECIMEN: NORMAL

## 2017-06-13 PROCEDURE — 80307 DRUG TEST PRSMV CHEM ANLYZR: CPT | Performed by: EMERGENCY MEDICINE

## 2017-06-13 PROCEDURE — 85730 THROMBOPLASTIN TIME PARTIAL: CPT | Performed by: EMERGENCY MEDICINE

## 2017-06-13 PROCEDURE — 70450 CT HEAD/BRAIN W/O DYE: CPT

## 2017-06-13 PROCEDURE — 83735 ASSAY OF MAGNESIUM: CPT | Performed by: EMERGENCY MEDICINE

## 2017-06-13 PROCEDURE — 85610 PROTHROMBIN TIME: CPT | Performed by: EMERGENCY MEDICINE

## 2017-06-13 PROCEDURE — 80307 DRUG TEST PRSMV CHEM ANLYZR: CPT | Performed by: INTERNAL MEDICINE

## 2017-06-13 PROCEDURE — 86920 COMPATIBILITY TEST SPIN: CPT

## 2017-06-13 PROCEDURE — A9558 XE133 XENON 10MCI: HCPCS | Performed by: INTERNAL MEDICINE

## 2017-06-13 PROCEDURE — 86850 RBC ANTIBODY SCREEN: CPT | Performed by: INTERNAL MEDICINE

## 2017-06-13 PROCEDURE — 81003 URINALYSIS AUTO W/O SCOPE: CPT | Performed by: EMERGENCY MEDICINE

## 2017-06-13 PROCEDURE — 0 XENON XE 133: Performed by: INTERNAL MEDICINE

## 2017-06-13 PROCEDURE — 93010 ELECTROCARDIOGRAM REPORT: CPT | Performed by: INTERNAL MEDICINE

## 2017-06-13 PROCEDURE — 30233N1 TRANSFUSION OF NONAUTOLOGOUS RED BLOOD CELLS INTO PERIPHERAL VEIN, PERCUTANEOUS APPROACH: ICD-10-PCS | Performed by: INTERNAL MEDICINE

## 2017-06-13 PROCEDURE — 93005 ELECTROCARDIOGRAM TRACING: CPT

## 2017-06-13 PROCEDURE — 83690 ASSAY OF LIPASE: CPT | Performed by: EMERGENCY MEDICINE

## 2017-06-13 PROCEDURE — 80053 COMPREHEN METABOLIC PANEL: CPT | Performed by: EMERGENCY MEDICINE

## 2017-06-13 PROCEDURE — 72125 CT NECK SPINE W/O DYE: CPT

## 2017-06-13 PROCEDURE — 86901 BLOOD TYPING SEROLOGIC RH(D): CPT | Performed by: INTERNAL MEDICINE

## 2017-06-13 PROCEDURE — 82140 ASSAY OF AMMONIA: CPT | Performed by: INTERNAL MEDICINE

## 2017-06-13 PROCEDURE — 86900 BLOOD TYPING SEROLOGIC ABO: CPT | Performed by: INTERNAL MEDICINE

## 2017-06-13 PROCEDURE — 82803 BLOOD GASES ANY COMBINATION: CPT

## 2017-06-13 PROCEDURE — 85379 FIBRIN DEGRADATION QUANT: CPT | Performed by: EMERGENCY MEDICINE

## 2017-06-13 PROCEDURE — 78582 LUNG VENTILAT&PERFUS IMAGING: CPT

## 2017-06-13 PROCEDURE — 63710000001 INSULIN REGULAR HUMAN PER 5 UNITS: Performed by: EMERGENCY MEDICINE

## 2017-06-13 PROCEDURE — P9612 CATHETERIZE FOR URINE SPEC: HCPCS

## 2017-06-13 PROCEDURE — 83605 ASSAY OF LACTIC ACID: CPT | Performed by: EMERGENCY MEDICINE

## 2017-06-13 PROCEDURE — 85025 COMPLETE CBC W/AUTO DIFF WBC: CPT | Performed by: EMERGENCY MEDICINE

## 2017-06-13 PROCEDURE — 71010 HC CHEST PA OR AP: CPT

## 2017-06-13 PROCEDURE — 25010000002 CALCIUM GLUCONATE PER 10 ML: Performed by: EMERGENCY MEDICINE

## 2017-06-13 PROCEDURE — 84484 ASSAY OF TROPONIN QUANT: CPT | Performed by: EMERGENCY MEDICINE

## 2017-06-13 PROCEDURE — 82150 ASSAY OF AMYLASE: CPT | Performed by: EMERGENCY MEDICINE

## 2017-06-13 PROCEDURE — 85007 BL SMEAR W/DIFF WBC COUNT: CPT | Performed by: EMERGENCY MEDICINE

## 2017-06-13 PROCEDURE — 36600 WITHDRAWAL OF ARTERIAL BLOOD: CPT

## 2017-06-13 PROCEDURE — 84443 ASSAY THYROID STIM HORMONE: CPT | Performed by: EMERGENCY MEDICINE

## 2017-06-13 PROCEDURE — 83880 ASSAY OF NATRIURETIC PEPTIDE: CPT | Performed by: EMERGENCY MEDICINE

## 2017-06-13 PROCEDURE — 99285 EMERGENCY DEPT VISIT HI MDM: CPT

## 2017-06-13 RX ORDER — CALCIUM GLUCONATE 94 MG/ML
1 INJECTION, SOLUTION INTRAVENOUS ONCE
Status: COMPLETED | OUTPATIENT
Start: 2017-06-13 | End: 2017-06-13

## 2017-06-13 RX ORDER — PANTOPRAZOLE SODIUM 40 MG/10ML
40 INJECTION, POWDER, LYOPHILIZED, FOR SOLUTION INTRAVENOUS
Status: DISCONTINUED | OUTPATIENT
Start: 2017-06-14 | End: 2017-06-14 | Stop reason: CLARIF

## 2017-06-13 RX ORDER — ONDANSETRON 2 MG/ML
4 INJECTION INTRAMUSCULAR; INTRAVENOUS EVERY 6 HOURS PRN
Status: DISCONTINUED | OUTPATIENT
Start: 2017-06-13 | End: 2017-06-24 | Stop reason: HOSPADM

## 2017-06-13 RX ORDER — DEXTROSE MONOHYDRATE 25 G/50ML
25 INJECTION, SOLUTION INTRAVENOUS ONCE
Status: COMPLETED | OUTPATIENT
Start: 2017-06-13 | End: 2017-06-13

## 2017-06-13 RX ORDER — THIAMINE MONONITRATE (VIT B1) 100 MG
100 TABLET ORAL DAILY
Status: DISCONTINUED | OUTPATIENT
Start: 2017-06-13 | End: 2017-06-14

## 2017-06-13 RX ORDER — SODIUM CHLORIDE 9 MG/ML
125 INJECTION, SOLUTION INTRAVENOUS CONTINUOUS
Status: DISCONTINUED | OUTPATIENT
Start: 2017-06-13 | End: 2017-06-24 | Stop reason: HOSPADM

## 2017-06-13 RX ADMIN — ACTIVATED CHARCOAL 50 G: 208 SUSPENSION ORAL at 19:14

## 2017-06-13 RX ADMIN — DEXTROSE MONOHYDRATE 25 G: 25 INJECTION, SOLUTION INTRAVENOUS at 19:47

## 2017-06-13 RX ADMIN — SODIUM CHLORIDE 125 ML/HR: 9 INJECTION, SOLUTION INTRAVENOUS at 19:35

## 2017-06-13 RX ADMIN — Medication 11.3 MILLICURIE: at 23:45

## 2017-06-13 RX ADMIN — CALCIUM GLUCONATE 1 G: 94 INJECTION, SOLUTION INTRAVENOUS at 19:44

## 2017-06-13 RX ADMIN — INSULIN HUMAN 10 UNITS: 100 INJECTION, SOLUTION PARENTERAL at 19:48

## 2017-06-13 RX ADMIN — SODIUM CHLORIDE 250 ML: 9 INJECTION, SOLUTION INTRAVENOUS at 17:20

## 2017-06-13 NOTE — ED PROVIDER NOTES
Subjective   HPI Comments: Patient is a reported 57-year-old male who per EMS reports the patient was found unresponsive at a hotel and the police department called EMS to bring the patient to the emergency department.  Patient reportedly said that he took 20 tablets of Valium and Klonopin.  The patient reports that he was not trying to hurt himself.  The patient does not give any further information.  The patient does not answer any other questions.      History provided by:  Patient and EMS personnel      Review of Systems   Unable to perform ROS: Acuity of condition       Past Medical History:   Diagnosis Date   • Chronic pain    • Depression    • Ethanolism    • GERD (gastroesophageal reflux disease)    • Hypertension        No Known Allergies    Past Surgical History:   Procedure Laterality Date   • GASTROSTOMY FEEDING TUBE INSERTION      was put in place and taken out in [place]    • TRACHEOSTOMY CLOSURE/STOMA REVISION         Family History   Problem Relation Age of Onset   • Colon cancer Father        Social History     Social History   • Marital status:      Spouse name: N/A   • Number of children: N/A   • Years of education: N/A     Social History Main Topics   • Smoking status: Current Every Day Smoker     Packs/day: 1.00   • Smokeless tobacco: None   • Alcohol use No   • Drug use: No   • Sexual activity: Not Asked     Other Topics Concern   • None     Social History Narrative           Objective   Physical Exam   Constitutional:   Ill-appearing, very poor historian male patient.   HENT:   Head: Normocephalic and atraumatic.   Right Ear: External ear normal.   Left Ear: External ear normal.   Nose: Nose normal.   Yellowish material noted on the patient's tongue and intraorally.   Eyes: Conjunctivae and EOM are normal. Pupils are equal, round, and reactive to light. Right eye exhibits no discharge. Left eye exhibits no discharge.   Neck: Normal range of motion. Neck supple. No tracheal  deviation present. No thyromegaly present.   Cardiovascular: Normal rate, regular rhythm, normal heart sounds and intact distal pulses.    No murmur heard.  Pulmonary/Chest: Effort normal. No respiratory distress. He exhibits no tenderness.   Decreased bilateral air entry   Abdominal: Soft. He exhibits no distension. There is no tenderness.   Musculoskeletal: He exhibits no edema, tenderness or deformity.   Patient refuses to move any of his extremities.   Neurological: No cranial nerve deficit.   Responds to painful stimuli.   Skin: Skin is dry. No erythema. There is pallor.   Psychiatric:   Very poor historian.   Nursing note and vitals reviewed.      Procedures         ED Course  ED Course                  MDM  Number of Diagnoses or Management Options  Acute renal failure, unspecified acute renal failure type: new and requires workup  Hyperkalemia: new and requires workup  Hypoxia:   Overdose, undetermined intent, initial encounter:      Amount and/or Complexity of Data Reviewed  Clinical lab tests: ordered and reviewed  Tests in the radiology section of CPT®: ordered and reviewed  Discuss the patient with other providers: yes    Risk of Complications, Morbidity, and/or Mortality  Presenting problems: moderate  Diagnostic procedures: moderate  Management options: moderate    Patient Progress  Patient progress: stable      Final diagnoses:   Acute renal failure, unspecified acute renal failure type   Hyperkalemia   Overdose, undetermined intent, initial encounter   Hypoxia            Carlos Sheehan MD  06/13/17 0598

## 2017-06-13 NOTE — ED NOTES
Cinthia Bertrand from poison control contacted about potential overdose of 20 valium's and klonopin's.  Reports may cause decrease in BP, speech slurring, and ataxia; activated charcoal can be given risk of aspiration is low enough     Hannah Rojas RN  06/13/17 5809

## 2017-06-14 LAB
ABO GROUP BLD: NORMAL
ANION GAP SERPL CALCULATED.3IONS-SCNC: 13 MMOL/L (ref 4–13)
APAP SERPL-MCNC: <10 MCG/ML (ref 10–30)
BLD GP AB SCN SERPL QL: NEGATIVE
BUN BLD-MCNC: 69 MG/DL (ref 5–21)
BUN/CREAT SERPL: 29.1 (ref 7–25)
CALCIUM SPEC-SCNC: 8.7 MG/DL (ref 8.4–10.4)
CHLORIDE SERPL-SCNC: 107 MMOL/L (ref 98–110)
CO2 SERPL-SCNC: 25 MMOL/L (ref 24–31)
CREAT BLD-MCNC: 2.37 MG/DL (ref 0.5–1.4)
DEPRECATED RDW RBC AUTO: 56.4 FL (ref 40–54)
DEPRECATED RDW RBC AUTO: NORMAL FL (ref 40–54)
ERYTHROCYTE [DISTWIDTH] IN BLOOD BY AUTOMATED COUNT: 22.7 % (ref 12–15)
ERYTHROCYTE [DISTWIDTH] IN BLOOD BY AUTOMATED COUNT: NORMAL % (ref 12–15)
GFR SERPL CREATININE-BSD FRML MDRD: 29 ML/MIN/1.73
GLUCOSE BLD-MCNC: 88 MG/DL (ref 70–100)
HCT VFR BLD AUTO: 26.7 % (ref 40–52)
HCT VFR BLD AUTO: NORMAL % (ref 40–52)
HGB BLD-MCNC: 7.8 G/DL (ref 14–18)
HGB BLD-MCNC: NORMAL G/DL (ref 14–18)
MCH RBC QN AUTO: 20.6 PG (ref 28–32)
MCH RBC QN AUTO: NORMAL PG (ref 28–32)
MCHC RBC AUTO-ENTMCNC: 29.2 G/DL (ref 33–36)
MCHC RBC AUTO-ENTMCNC: NORMAL G/DL (ref 33–36)
MCV RBC AUTO: 70.6 FL (ref 82–95)
MCV RBC AUTO: NORMAL FL (ref 82–95)
PLATELET # BLD AUTO: 253 10*3/MM3 (ref 130–400)
PLATELET # BLD AUTO: NORMAL 10*3/MM3 (ref 130–400)
PMV BLD AUTO: 9.5 FL (ref 6–12)
PMV BLD AUTO: NORMAL FL (ref 6–12)
POTASSIUM BLD-SCNC: 5.6 MMOL/L (ref 3.5–5.3)
RBC # BLD AUTO: 3.78 10*6/MM3 (ref 4.8–5.9)
RBC # BLD AUTO: NORMAL 10*6/MM3 (ref 4.8–5.9)
RH BLD: POSITIVE
SALICYLATES SERPL-MCNC: <1 MG/DL (ref 15–30)
SODIUM BLD-SCNC: 145 MMOL/L (ref 135–145)
WBC NRBC COR # BLD: 9.48 10*3/MM3 (ref 4.8–10.8)
WBC NRBC COR # BLD: NORMAL 10*3/MM3 (ref 4.8–10.8)

## 2017-06-14 PROCEDURE — 25010000002 ONDANSETRON PER 1 MG: Performed by: INTERNAL MEDICINE

## 2017-06-14 PROCEDURE — 0 TECHNETIUM ALBUMIN AGGREGATED: Performed by: INTERNAL MEDICINE

## 2017-06-14 PROCEDURE — 87086 URINE CULTURE/COLONY COUNT: CPT | Performed by: INTERNAL MEDICINE

## 2017-06-14 PROCEDURE — G8997 SWALLOW GOAL STATUS: HCPCS | Performed by: SPEECH-LANGUAGE PATHOLOGIST

## 2017-06-14 PROCEDURE — 25010000002 THIAMINE PER 100 MG: Performed by: FAMILY MEDICINE

## 2017-06-14 PROCEDURE — A9540 TC99M MAA: HCPCS | Performed by: INTERNAL MEDICINE

## 2017-06-14 PROCEDURE — 36430 TRANSFUSION BLD/BLD COMPNT: CPT

## 2017-06-14 PROCEDURE — 92610 EVALUATE SWALLOWING FUNCTION: CPT | Performed by: SPEECH-LANGUAGE PATHOLOGIST

## 2017-06-14 PROCEDURE — 86900 BLOOD TYPING SEROLOGIC ABO: CPT

## 2017-06-14 PROCEDURE — G8998 SWALLOW D/C STATUS: HCPCS | Performed by: SPEECH-LANGUAGE PATHOLOGIST

## 2017-06-14 PROCEDURE — 85027 COMPLETE CBC AUTOMATED: CPT | Performed by: INTERNAL MEDICINE

## 2017-06-14 PROCEDURE — 63710000001 INSULIN REGULAR HUMAN PER 5 UNITS: Performed by: INTERNAL MEDICINE

## 2017-06-14 PROCEDURE — G8996 SWALLOW CURRENT STATUS: HCPCS | Performed by: SPEECH-LANGUAGE PATHOLOGIST

## 2017-06-14 PROCEDURE — 80048 BASIC METABOLIC PNL TOTAL CA: CPT | Performed by: INTERNAL MEDICINE

## 2017-06-14 PROCEDURE — P9016 RBC LEUKOCYTES REDUCED: HCPCS

## 2017-06-14 RX ORDER — NICOTINE 21 MG/24HR
1 PATCH, TRANSDERMAL 24 HOURS TRANSDERMAL DAILY
Status: DISCONTINUED | OUTPATIENT
Start: 2017-06-14 | End: 2017-06-24 | Stop reason: HOSPADM

## 2017-06-14 RX ORDER — CHLORDIAZEPOXIDE HYDROCHLORIDE 25 MG/1
25 CAPSULE, GELATIN COATED ORAL EVERY 6 HOURS SCHEDULED
Status: DISCONTINUED | OUTPATIENT
Start: 2017-06-14 | End: 2017-06-14

## 2017-06-14 RX ORDER — DEXTROSE MONOHYDRATE 25 G/50ML
50 INJECTION, SOLUTION INTRAVENOUS ONCE
Status: COMPLETED | OUTPATIENT
Start: 2017-06-14 | End: 2017-06-14

## 2017-06-14 RX ORDER — PANTOPRAZOLE SODIUM 40 MG/1
40 TABLET, DELAYED RELEASE ORAL
Status: DISCONTINUED | OUTPATIENT
Start: 2017-06-15 | End: 2017-06-24 | Stop reason: HOSPADM

## 2017-06-14 RX ORDER — ACETAMINOPHEN 325 MG/1
650 TABLET ORAL EVERY 6 HOURS PRN
Status: DISCONTINUED | OUTPATIENT
Start: 2017-06-14 | End: 2017-06-15

## 2017-06-14 RX ORDER — HYDROCODONE BITARTRATE AND ACETAMINOPHEN 5; 325 MG/1; MG/1
1 TABLET ORAL EVERY 8 HOURS PRN
Status: DISCONTINUED | OUTPATIENT
Start: 2017-06-14 | End: 2017-06-15

## 2017-06-14 RX ORDER — TRAMADOL HYDROCHLORIDE 50 MG/1
50 TABLET ORAL 2 TIMES DAILY
Status: DISCONTINUED | OUTPATIENT
Start: 2017-06-14 | End: 2017-06-24 | Stop reason: HOSPADM

## 2017-06-14 RX ORDER — CHLORDIAZEPOXIDE HYDROCHLORIDE 25 MG/1
50 CAPSULE, GELATIN COATED ORAL EVERY 6 HOURS SCHEDULED
Status: DISCONTINUED | OUTPATIENT
Start: 2017-06-14 | End: 2017-06-14

## 2017-06-14 RX ORDER — GABAPENTIN 300 MG/1
300 CAPSULE ORAL EVERY 8 HOURS SCHEDULED
Status: DISCONTINUED | OUTPATIENT
Start: 2017-06-14 | End: 2017-06-24 | Stop reason: HOSPADM

## 2017-06-14 RX ORDER — CHLORDIAZEPOXIDE HYDROCHLORIDE 25 MG/1
50 CAPSULE, GELATIN COATED ORAL EVERY 6 HOURS SCHEDULED
Status: COMPLETED | OUTPATIENT
Start: 2017-06-14 | End: 2017-06-24

## 2017-06-14 RX ADMIN — CHLORDIAZEPOXIDE HYDROCHLORIDE 50 MG: 25 CAPSULE ORAL at 09:18

## 2017-06-14 RX ADMIN — TRAMADOL HYDROCHLORIDE 50 MG: 50 TABLET, COATED ORAL at 10:38

## 2017-06-14 RX ADMIN — FOLIC ACID 1 MG: 5 INJECTION, SOLUTION INTRAMUSCULAR; INTRAVENOUS; SUBCUTANEOUS at 02:56

## 2017-06-14 RX ADMIN — CHLORDIAZEPOXIDE HYDROCHLORIDE 50 MG: 25 CAPSULE ORAL at 17:13

## 2017-06-14 RX ADMIN — DEXTROSE MONOHYDRATE 50 ML: 25 INJECTION, SOLUTION INTRAVENOUS at 05:56

## 2017-06-14 RX ADMIN — INSULIN HUMAN 6 UNITS: 100 INJECTION, SOLUTION PARENTERAL at 05:56

## 2017-06-14 RX ADMIN — HYDROCODONE BITARTRATE AND ACETAMINOPHEN 1 TABLET: 5; 325 TABLET ORAL at 16:41

## 2017-06-14 RX ADMIN — GABAPENTIN 300 MG: 300 CAPSULE ORAL at 21:37

## 2017-06-14 RX ADMIN — NICOTINE 1 PATCH: 21 PATCH, EXTENDED RELEASE TRANSDERMAL at 10:39

## 2017-06-14 RX ADMIN — ACETAMINOPHEN 650 MG: 325 TABLET, FILM COATED ORAL at 14:01

## 2017-06-14 RX ADMIN — HYDROCODONE BITARTRATE AND ACETAMINOPHEN 1 TABLET: 5; 325 TABLET ORAL at 09:18

## 2017-06-14 RX ADMIN — PANTOPRAZOLE SODIUM 40 MG: 40 INJECTION, POWDER, FOR SOLUTION INTRAVENOUS at 05:40

## 2017-06-14 RX ADMIN — Medication 1 DOSE: at 00:00

## 2017-06-14 RX ADMIN — CHLORDIAZEPOXIDE HYDROCHLORIDE 50 MG: 25 CAPSULE ORAL at 23:20

## 2017-06-14 RX ADMIN — SODIUM CHLORIDE 125 ML/HR: 9 INJECTION, SOLUTION INTRAVENOUS at 17:14

## 2017-06-14 RX ADMIN — GABAPENTIN 300 MG: 300 CAPSULE ORAL at 09:18

## 2017-06-14 RX ADMIN — ONDANSETRON 4 MG: 2 INJECTION INTRAMUSCULAR; INTRAVENOUS at 09:14

## 2017-06-14 RX ADMIN — TRAMADOL HYDROCHLORIDE 50 MG: 50 TABLET, COATED ORAL at 17:13

## 2017-06-14 RX ADMIN — ACETAMINOPHEN 650 MG: 325 TABLET, FILM COATED ORAL at 19:56

## 2017-06-14 RX ADMIN — FOLIC ACID 125 ML/HR: 5 INJECTION, SOLUTION INTRAMUSCULAR; INTRAVENOUS; SUBCUTANEOUS at 09:38

## 2017-06-14 RX ADMIN — SODIUM CHLORIDE 125 ML/HR: 9 INJECTION, SOLUTION INTRAVENOUS at 09:19

## 2017-06-14 NOTE — ED NOTES
PT KEEPS REMOVING MONITOR LEADS. HE TOOK OUT ONE OF HIS IVS.     Glenda Betancourt, SHARONDA  06/13/17 2025       Ayala Hilton, LAURENCE  06/13/17 2128

## 2017-06-14 NOTE — PLAN OF CARE
Problem: Patient Care Overview (Adult)  Goal: Plan of Care Review  Outcome: Ongoing (interventions implemented as appropriate)    06/14/17 0982   Coping/Psychosocial Response Interventions   Plan Of Care Reviewed With patient;caregiver   Patient Care Overview   Progress improving   Outcome Evaluation   Outcome Summary/Follow up Plan Some increased astication but due to not having dentures. Throat clearing several times with honey thick liquids but said once we stated that he felt like he needed to continue. Multiple other trials of thinner consistencies showed no overt s/s of aspiration. Asked for a soft diet due to no dentures. Ok to upgrade to regular per pt request. ST not warranted. Reconsult if any concerns.

## 2017-06-14 NOTE — PROGRESS NOTES
Discharge Planning Assessment  [facility]     Patient Name: Magnus Grande  MRN: [id number]  Today's Date: 6/14/2017    Admit Date: 6/13/2017          Discharge Needs Assessment       06/14/17 1139    Living Environment    Lives With alone    Living Arrangements hotel/motel    Type of Financial/Environmental Concern no permanent residence    Transportation Available car;family or friend will provide;public transportation    Living Environment    Provides Primary Care For no one    Quality Of Family Relationships unable to assess    Discharge Needs Assessment    Concerns To Be Addressed care coordination/care conferences    Readmission Within The Last 30 Days current reason for admission unrelated to previous admission    Equipment Currently Used at Home none    Equipment Needed After Discharge none    Current Discharge Risk substance abuse;history of non-alliance    Discharge Planning Comments Spoke to patient regarding current living arrangements.  Patient stated he is residing at [facility] in [place].  SW contacted [facility] and spoke with Misa [facility], who advised patient had actually left [facility] on May 25th and hasn't been back since.  Therefore, patient is not a current resident at [facility] in [place].  OMAR went back to speak to patient regarding living arrangements and he had received Librium and was sleeping heavily, did not wake him.  Patient has [facility] consult.              Discharge Plan     None        Discharge Placement     No information found                Demographic Summary     None            Functional Status     None            Psychosocial     None            Abuse/Neglect     None            Legal     None            Substance Abuse     None            Patient Forms     None          QUETA Alcaraz      Received call from LAURENCE Mendoza, advising the  at [facility] called and advised patient had been staying there.

## 2017-06-14 NOTE — PLAN OF CARE
Problem: Patient Care Overview (Adult)  Goal: Plan of Care Review  Outcome: Ongoing (interventions implemented as appropriate)    06/14/17 0457   Coping/Psychosocial Response Interventions   Plan Of Care Reviewed With patient   Patient Care Overview   Progress no change   Outcome Evaluation   Outcome Summary/Follow up Plan pt alert with appropriate responses to questions asked. but confused. Pt becoming more restless and agitated throughout night. Pt admits to prior suicide attempts. pt also admits to drinking vodka daily. Vitals stable.         Problem: Fall Risk (Adult)  Goal: Identify Related Risk Factors and Signs and Symptoms  Outcome: Ongoing (interventions implemented as appropriate)  Goal: Absence of Falls  Outcome: Ongoing (interventions implemented as appropriate)    Problem: Renal Failure/Kidney Injury, Acute (Adult)  Goal: Signs and Symptoms of Listed Potential Problems Will be Absent or Manageable (Renal Failure/Kidney Injury, Acute)  Outcome: Ongoing (interventions implemented as appropriate)    Problem: Depression (Adult,Obstetrics,Pediatric)  Goal: Identify Related Risk Factors and Signs and Symptoms  Outcome: Ongoing (interventions implemented as appropriate)  Goal: Establish/Maintain Self-Care Routine  Outcome: Ongoing (interventions implemented as appropriate)  Goal: Improved/Stable Mood  Outcome: Ongoing (interventions implemented as appropriate)    Problem: Overdose, Ingestion/Inhalants (Adult)  Goal: Signs and Symptoms of Listed Potential Problems Will be Absent or Manageable (Overdose, Ingestion/Inhalants)  Outcome: Ongoing (interventions implemented as appropriate)

## 2017-06-14 NOTE — PROGRESS NOTES
[facility] Services  INPATIENT PROGRESS NOTE    Length of Stay: 1  Date of Admission: 6/13/2017  Primary Care Physician: No Known Provider    Subjective   Chief Complaint: Drug overdose, acute kidney failure, hyperkalemia    HPI   Patient's awake, alert.  Oriented ×3.  Denies any chest pain or shortness breath. Denies any suicidal, homicidal thoughts.  Last alcohol drink was 2 days ago.  Patient had a pint of vodka.  No sign of withdrawal at this time.    Review of Systems   Constitutional: Positive for fatigue. Negative for activity change, appetite change, chills and fever.   HENT: Negative for hearing loss, nosebleeds, tinnitus and trouble swallowing.    Eyes: Negative for visual disturbance.   Respiratory: Negative for cough, chest tightness, shortness of breath and wheezing.    Cardiovascular: Negative for chest pain, palpitations and leg swelling.   Gastrointestinal: Negative for abdominal distention, abdominal pain, blood in stool, constipation, diarrhea, nausea and vomiting.   Endocrine: Negative for cold intolerance, heat intolerance, polydipsia, polyphagia and polyuria.   Genitourinary: Negative for decreased urine volume, difficulty urinating, dysuria, flank pain, frequency and hematuria.   Musculoskeletal: Negative for arthralgias, joint swelling and myalgias.   Skin: Negative for rash.   Allergic/Immunologic: Negative for immunocompromised state.   Neurological: Negative for dizziness, syncope, light-headedness and headaches.   Hematological: Negative for adenopathy. Does not bruise/bleed easily.   Psychiatric/Behavioral: Positive for agitation and confusion. Negative for sleep disturbance. The patient is not nervous/anxious.           All pertinent negatives and positives are as above. All other systems have been reviewed and are negative unless otherwise stated.     Objective    Temp:  [98.9 °F (37.2 °C)-100.1 °F (37.8 °C)] 98.9 °F (37.2 °C)  Heart Rate:   [] 86  Resp:  [18-22] 18  BP: ()/() 122/94    Intake/Output Summary (Last 24 hours) at 06/14/17 0820  Last data filed at 06/14/17 0430   Gross per 24 hour   Intake              619 ml   Output             1300 ml   Net             -681 ml     Physical Exam   Constitutional: He is oriented to person, place, and time. He appears well-developed and well-nourished.   HENT:   Head: Normocephalic and atraumatic.   Eyes: Conjunctivae and EOM are normal. Pupils are equal, round, and reactive to light.   Neck: Neck supple. No JVD present. No thyromegaly present.   Cardiovascular: Normal rate, regular rhythm, normal heart sounds and intact distal pulses.  Exam reveals no gallop and no friction rub.    No murmur heard.  Pulmonary/Chest: Effort normal and breath sounds normal. No respiratory distress. He has no wheezes. He has no rales. He exhibits no tenderness.   Abdominal: Soft. Bowel sounds are normal. He exhibits no distension. There is no tenderness. There is no rebound and no guarding.   Musculoskeletal: Normal range of motion. He exhibits no edema, tenderness or deformity.   Generalized weakness   Lymphadenopathy:     He has no cervical adenopathy.   Neurological: He is alert and oriented to person, place, and time. He displays normal reflexes. No cranial nerve deficit. He exhibits normal muscle tone.   Skin: Skin is warm and dry. No rash noted.   Psychiatric: He has a normal mood and affect. His behavior is normal. Thought content normal.   Nursing note and vitals reviewed.      Results Review:  Lab Results (last 24 hours)     Procedure Component Value Units Date/Time    Magnesium [[id number]]  (Abnormal) Collected:  06/13/17 1822    Specimen:  Blood Updated:  06/13/17 1849     Magnesium 2.4 (H) mg/dL     Lipase [[id number]]  (Normal) Collected:  06/13/17 1822    Specimen:  Blood Updated:  06/13/17 1849     Lipase 51 U/L     Amylase [[id number]]  (Normal) Collected:  06/13/17 1822    Specimen:  Blood  Updated:  06/13/17 1849     Amylase 95 U/L     Ethanol [[id number]]  (Normal) Collected:  06/13/17 1822    Specimen:  Blood Updated:  06/13/17 1849     Ethanol % <0.010 %     Narrative:       Not for legal purposes. Chain of Custody not followed.     Protime-INR [[id number]]  (Normal) Collected:  06/13/17 1822    Specimen:  Blood Updated:  06/13/17 1849     Protime 14.5 Seconds      INR 1.09    aPTT [[id number]]  (Normal) Collected:  06/13/17 1822    Specimen:  Blood Updated:  06/13/17 1849     PTT 27.2 seconds     D-dimer, Quantitative [[id number]]  (Abnormal) Collected:  06/13/17 1822    Specimen:  Blood Updated:  06/13/17 1849     D-Dimer, Quantitative 2.22 (H) mg/L (FEU)     Narrative:       Reference Range is 0-0.50 mg/L FEU. However, results <0.50 mg/L FEU tends to rule out DVT or PE. Results >0.50 mg/L FEU are not useful in predicting absence or presence of DVT or PE.    Comprehensive Metabolic Panel [[id number]]  (Abnormal) Collected:  06/13/17 1822    Specimen:  Blood Updated:  06/13/17 1856     Glucose 97 mg/dL      BUN 79 (H) mg/dL      Creatinine 3.41 (H) mg/dL      Sodium 141 mmol/L      Potassium 6.7 (C) mmol/L      Chloride 105 mmol/L      CO2 22.0 (L) mmol/L      Calcium 7.9 (L) mg/dL      Total Protein 7.1 g/dL      Albumin 3.80 g/dL      ALT (SGPT) 49 U/L      AST (SGOT) 61 (H) U/L      Alkaline Phosphatase 83 U/L      Total Bilirubin 0.5 mg/dL      eGFR Non African Amer 19 (L) mL/min/1.73      Globulin 3.3 gm/dL      A/G Ratio 1.2 g/dL      BUN/Creatinine Ratio 23.2     Anion Gap 14.0 (H) mmol/L     BNP [[id number]]  (Abnormal) Collected:  06/13/17 1822    Specimen:  Blood Updated:  06/13/17 1900     proBNP 1580.0 (H) pg/mL     Troponin [[id number]]  (Normal) Collected:  06/13/17 1822    Specimen:  Blood Updated:  06/13/17 1900     Troponin I <0.012 ng/mL     Blood Gas, Arterial [[id number]]  (Abnormal) Collected:  06/13/17 1913    Specimen:  Arterial Blood Updated:  06/13/17 1918     Site Arterial: left  radial     Darvin's Test --      Documented in Rapid Comm        pH, Arterial 7.345 (L) pH units      pCO2, Arterial 38.5 mm Hg      pO2, Arterial 78.0 (L) mm Hg      HCO3, Arterial 20.5 (L) mmol/L      Base Excess, Arterial -4.6 (L) mmol/L      O2 Saturation, Arterial 95.0 %      O2 Saturation Calculated 95.0 %      Barometric Pressure for Blood Gas -- mmHg       Component not reported at this site.        Modality Cannula     Flow Rate 2.00 lpm     Narrative:       Serial Number: [id number]    : [id number]    Lactic Acid, Plasma [[id number]]  (Normal) Collected:  06/13/17 1912    Specimen:  Blood Updated:  06/13/17 1935     Lactate 0.6 mmol/L     Urinalysis With / Culture If Indicated [[id number]]  (Abnormal) Collected:  06/13/17 1931    Specimen:  Urine from Urine, Catheter Updated:  06/13/17 1947     Color, UA Yellow     Appearance, UA Clear     pH, UA <=5.0     Specific Gravity, UA 1.019     Glucose, UA Negative     Ketones, UA Negative     Bilirubin, UA Negative     Blood, UA Negative     Protein, UA Trace (A)     Leuk Esterase, UA Negative     Nitrite, UA Negative     Urobilinogen, UA 0.2 E.U./dL    Narrative:       Urine microscopic not indicated.    Light Blue Top [[id number]] Collected:  06/13/17 1822    Specimen:  Blood Updated:  06/13/17 2001     Extra Tube hold for add-on      Auto resulted       Green Top (Gel) [[id number]] Collected:  06/13/17 1822    Specimen:  Blood Updated:  06/13/17 2001     Extra Tube Hold for add-ons.      Auto resulted.       TSH [[id number]]  (Normal) Collected:  06/13/17 1912    Specimen:  Blood Updated:  06/13/17 2007     TSH 1.330 mIU/mL     CBC & Differential [[id number]] Collected:  06/13/17 1912    Specimen:  Blood Updated:  06/13/17 2016    Narrative:       The following orders were created for panel order CBC & Differential.  Procedure                               Abnormality         Status                     ---------                               -----------         ------                      Scan Slide[[id number]]                                       Final result               CBC Auto Differential[[id number]]        Abnormal            Final result                 Please view results for these tests on the individual orders.    CBC Auto Differential [[id number]]  (Abnormal) Collected:  06/13/17 1912    Specimen:  Blood Updated:  06/13/17 2016     WBC 12.26 (H) 10*3/mm3      RBC 3.57 (L) 10*6/mm3      Hemoglobin 7.2 (L) g/dL      Hematocrit 24.4 (L) %      MCV 68.3 (L) fL      MCH 20.2 (L) pg      MCHC 29.5 (L) g/dL      RDW 21.0 (H) %      RDW-SD 46.4 fl      MPV 9.5 fL      Platelets 277 10*3/mm3      Neutrophil % 78.7 (H) %      Lymphocyte % 12.3 (L) %      Monocyte % 6.8 %      Eosinophil % 1.0 %      Basophil % 0.2 %      Immature Grans % 1.0 %      Neutrophils, Absolute 9.66 (H) 10*3/mm3      Lymphocytes, Absolute 1.51 10*3/mm3      Monocytes, Absolute 0.83 10*3/mm3      Eosinophils, Absolute 0.12 10*3/mm3      Basophils, Absolute 0.02 10*3/mm3      Immature Grans, Absolute 0.12 (H) 10*3/mm3      nRBC 0.0 /100 WBC     Scan Slide [[id number]] Collected:  06/13/17 1912    Specimen:  Blood Updated:  06/13/17 2016     Hypochromia Slight/1+     Microcytes Slight/1+     Poikilocytes Slight/1+     WBC Morphology Normal     Platelet Estimate Adequate    Urine Drug Screen [[id number]]  (Abnormal) Collected:  06/13/17 1932    Specimen:  Urine from Urine, Clean Catch Updated:  06/13/17 2053     Amphetamine Screen, Urine Negative     Barbiturates Screen, Urine Negative     Benzodiazepine Screen, Urine Positive (A)     Cocaine Screen, Urine Negative     Methadone Screen, Urine Negative     Opiate Screen Positive (A)     Phencyclidine (PCP), Urine Negative     THC, Screen, Urine Negative    Narrative:       Negative Thresholds For Drugs Screened in Urine:    Amphetamines          500 ng/ml  Barbiturates          200 ng/ml  Benzodiazepines       200 ng/ml  Cocaine               150  ng/ml  Methadone             150 ng/ml  Opiates               300 ng/ml  Phencyclidine         25 ng/ml  THC                      50 ng/ml    The normal value for all drugs tested is negative. This report includes final unconfirmed screening results.  A positive result by this assay can be, at your request, sent to the Reference Lab for confirmation by gas chromatography. Unconfirmed results must not be used for non-medical purposes, such as employment or legal testing. Clinical consideration should be applied to any drug of abuse test result, particularly when unconfirmed results are used.    Lavender Top [[id number]] Collected:  06/13/17 1912    Specimen:  Blood Updated:  06/13/17 2101     Extra Tube hold for add-on      Auto resulted       Center Draw [[id number]] Collected:  06/13/17 1822    Specimen:  Blood Updated:  06/13/17 2146    Narrative:       The following orders were created for panel order Center Draw.  Procedure                               Abnormality         Status                     ---------                               -----------         ------                     Light Blue Top[[id number]]                                   Final result               Green Top (Gel)[[id number]]                                  Final result               Lavender Top[[id number]]                                     Final result               Red Top[[id number]]                                                                     Green Top (No Gel)[[id number]]                                                            Please view results for these tests on the individual orders.    Ammonia [[id number]]  (Abnormal) Collected:  06/13/17 2225    Specimen:  Blood Updated:  06/13/17 2243     Ammonia <9 (L) umol/L     Urine Culture [[id number]] Collected:  06/14/17 0019    Specimen:  Urine from Urine, Clean Catch Updated:  06/14/17 0033    Acetaminophen Level [[id number]]  (Abnormal) Collected:  06/13/17 2350    Specimen:   Blood Updated:  06/14/17 0049     Acetaminophen <10.0 (L) mcg/mL     Salicylate Level [[id number]]  (Abnormal) Collected:  06/13/17 2350    Specimen:  Blood Updated:  06/14/17 0049     Salicylate <1.0 (L) mg/dL     Basic Metabolic Panel [[id number]]  (Abnormal) Collected:  06/14/17 0221    Specimen:  Blood Updated:  06/14/17 0413     Glucose 88 mg/dL      BUN 69 (H) mg/dL      Creatinine 2.37 (H) mg/dL      Sodium 145 mmol/L      Potassium 5.6 (H) mmol/L      Chloride 107 mmol/L      CO2 25.0 mmol/L      Calcium 8.7 mg/dL      eGFR Non African Amer 29 (L) mL/min/1.73      BUN/Creatinine Ratio 29.1 (H)     Anion Gap 13.0 mmol/L     Narrative:       GFR Normal >60  Chronic Kidney Disease <60  Kidney Failure <15  Specimen drawn while blood infusing    CBC (No Diff) [[id number]] Collected:  06/14/17 0221    Specimen:  Blood Updated:  06/14/17 0514     WBC -- 10*3/mm3       Patient receiving blood  Corrected result. Previous result was 10.76 10*3/mm3 on 6/14/2017 at [address] [place] [address]        RBC -- 10*6/mm3       Patient receiving blood  Corrected result. Previous result was 3.69 10*6/mm3 on 6/14/2017 at [address] [place] [address]        Hemoglobin -- g/dL       Patient receiving blood  Corrected result. Previous result was 7.2 g/dL on 6/14/2017 at [address] [place] [address]        Hematocrit -- %       Patient receiving blood  Corrected result. Previous result was 25.5 % on 6/14/2017 at [address] [place] [address]        MCV -- fL       Patient receiving blood  Corrected result. Previous result was 69.1 fL on 6/14/2017 at [address] [place] [address]        MCH -- pg       Patient receiving blood  Corrected result. Previous result was 19.5 pg on 6/14/2017 at [address] [place] [address]        MCHC -- g/dL       Patient receiving blood  Corrected result. Previous result was 28.2 g/dL on 6/14/2017 at [address] [place] [address]        RDW -- %       Corrected result. Previous result was 21.3 % on 6/14/2017 at [address] [place] [address]        RDW-SD -- fl       Corrected result. Previous result was 49.0 fl on 6/14/2017 at 0419 CDT        MPV -- fL       Corrected  result. Previous result was 10.1 fL on 6/14/2017 at 0419 CDT        Platelets -- 10*3/mm3       Patient receiving blood  Corrected result. Previous result was 316 10*3/mm3 on 6/14/2017 at 0419 CDT       Narrative:       Collected during blood transfusion    CBC (No Diff) [[id number]]  (Abnormal) Collected:  06/14/17 0618    Specimen:  Blood Updated:  06/14/17 0639     WBC 9.48 10*3/mm3      RBC 3.78 (L) 10*6/mm3      Hemoglobin 7.8 (L) g/dL      Hematocrit 26.7 (L) %      MCV 70.6 (L) fL      MCH 20.6 (L) pg      MCHC 29.2 (L) g/dL      RDW 22.7 (H) %      RDW-SD 56.4 (H) fl      MPV 9.5 fL      Platelets 253 10*3/mm3            Cultures:       Radiology Data:    Imaging Results (last 24 hours)     Procedure Component Value Units Date/Time    XR Chest 1 View [[id number]] Collected:  06/13/17 1822     Updated:  06/13/17 2100    Narrative:       EXAMINATION:   XR CHEST 1 VW-  6/13/2017 6:12 PM CDT     HISTORY: Hypoxia.     A single view of the chest is obtained. Chronic change is present in the  pulmonary parenchyma, similar to 09/19/2015. The cardiac silhouette is  mildly enlarged.       Impression:       Chronic interstitial change in the pulmonary parenchyma is  present. Subtle superimposed interstitial edema cannot be excluded.  This report was finalized on 06/13/2017 20:57 by Dr. Mode Willams MD.    CT Head Without Contrast [[id number]] Collected:  06/13/17 2114     Updated:  06/13/17 2158    Narrative:       CT BRAIN WITHOUT CONTRAST 6/13/2017 8:55 PM CDT     HISTORY: Altered mental status.     COMPARISON: 09/16/2015      DLP: 811 mGy cm     TECHNIQUE: Serial axial tomographic images of the brain were obtained  without the use of intravenous contrast.      FINDINGS:   The midline structures are nondisplaced. There is mild cerebral and  cerebellar volume loss, with an associated increase in the prominence of  the ventricles and sulci. The basilar cisterns are normal in size and  configuration. There is no evidence  of intracranial hemorrhage or  mass-effect.     . There are no abnormal extra-axial fluid collections. There is no  evidence of tonsillar herniation.      The included orbits and their contents are unremarkable. The visualized  paranasal sinuses, mastoid air cells and middle ear cavities are clear.  The visualized osseous structures and overlying soft tissues of the  skull and face are intact.        Impression:       No acute intracranial abnormality is identified. Mild cortical volume  loss is noted.        This report was finalized on 06/13/2017 21:55 by Dr. Mode Willams MD.    NM Lung Ventilation Perfusion [[id number]] Collected:  06/14/17 0733     Updated:  06/14/17 0737    Narrative:       EXAMINATION: NM LUNG VENTILATION PERFUSION- 6/14/2017 7:33 AM CDT     HISTORY: Elevated d-dimer.     Dose:  1. 11.3 mCi xenon-133 via inhalation.  2. 5.5 mCi technetium 99m MAA intravenously.     REPORT: Comparison is made with the chest x-ray of 06/13/2017, with no  focal infiltrates demonstrated. The ventilation images show normal  distribution of xenon within the lungs. The perfusion images show normal  homogeneous distribution of technetium within the lungs, there are no  filling defects to suggest significant pulmonary emboli.       Impression:       Low probability ventilation/perfusion lung scan for  significant pulmonary emboli.     A preliminary report was provided by the [facility] radiology service.     This report was finalized on 06/14/2017 07:34 by Dr. Neil Modi MD.    CT Cervical Spine Without Contrast [[id number]] Collected:  06/14/17 0738     Updated:  06/14/17 0744    Narrative:       EXAMINATION: CT CERVICAL SPINE WO CONTRAST- 6/14/2017 7:38 AM CDT     HISTORY: Neck pain, acute encephalopathy.     DOSE: 633 mGycm (Automatic exposure control technique was implemented in  an effort to keep the radiation dose as low as possible without  compromising image quality)     Report: Multiple axial images of the  cervical spine were obtained  without contrast, the examination includes reformatted sagittal and  coronal images. There are no comparison studies.     Alignment is normal except for slight retrolisthesis of C5 relative to  C6, this is compatible with grade 1 spondylolisthesis. There is moderate  disc space narrowing, endplate spurring and sclerosis at C5-6. No  fracture is identified. The prevertebral soft tissues have normal  thickness. There is mild central canal stenosis at C5-6. There is  moderate facet degeneration on the left C2-3, C3-4 and C4-5. There is  mild bilateral neural foraminal narrowing at C5-6 related to  osteophytes.       Impression:       Impression: No acute cervical spine abnormality. Chronic degenerative  disc disease at C5-6.     A preliminary report was provided by the [facility] radiology service.           This report was finalized on 06/14/2017 07:41 by Dr. Neil Modi MD.          No Known Allergies    Scheduled meds:     folic acid (FOLVITE) IVPB 1 mg Intravenous Daily   pantoprazole 40 mg Intravenous Q AM   thiamine (VITAMIN B1) IVPB 100 mg Intravenous Daily       PRN meds:  ondansetron    Assessment/Plan     Active Problems:    Acute renal failure    Altered mental status      Plan:  Altered mental status/drug overdose- Ct head, no acute change.  Tylenol and salicylate levels - low, ammonia level and lactic acid level- low. Patient denies any suicidal, homicidal thoughts.  Consult   [facility] to evaluate overdose.     Acute renal failure -  probably secondary to dehydration. Improving with IV fluid hydration.     Hyperkalemia-  status post calcium and dextrose insulin in the ER. Improve today.     Chronic pain-put patient back as Ultram and decrease the dose of Norco.  Continue Neurontin.     Anemia- S/P transfuse 1 unit of PRBCs. Stable.       Elevated D-dimer. V/Q, low probability.    Alcohol abuse-last drink was 2 days ago, start Librium    Diet regular    Discharge Planning:   Unknown    Mynor Kaye MD   06/14/17   8:20 AM

## 2017-06-14 NOTE — ED NOTES
RECEIVED REPORT ON PT FROM ALICIA Betancourt, SHARONDA  06/13/17 1937       Ayala Hilton, RN  06/13/17 6006

## 2017-06-14 NOTE — NURSING NOTE
Call received for  of [facility]. The lady stated pt stays there.  was told due to HIPPA could not give any information except that pt was stable. Verbalized understanding.

## 2017-06-14 NOTE — THERAPY DISCHARGE NOTE
Acute Care - Speech Language Pathology   Swallow Eval/Discharge [facility]     Patient Name: Magnus Grande  : 1960  MRN: [id number]  Today's Date: 2017               Admit Date: 2017      SPEECH-LANGUAGE PATHOLOGY EVALUATION - SWALLOW  Subjective: The patient was seen on this date for a Clinical Swallow evaluation.  Patient was alert and cooperative.    The patient's history is significant for failed dysphagia screening.   Objective: Textures given included thin liquid, nectar thick liquid, honey thick liquid, puree consistency and regular consistency.  Assessment: Difficulties were noted with honey thick liquid and regular consistency, characterized by multiple throat clearing with honey thick and increased mastication of solids.  Patient did not have dentures.  No other throat clearing with multiple trials of thinner consistencies.  Feel throat clearing was more behavioral and not related to swallow difficulties.  SLP Findings:  Patient presents with functional swallow, without esophageal component.   Recommendations: Diet Textures: thin liquid, mechanical soft consistency food.  Ok to upgraded to regular per patient request.  Medications should be taken whole with thin liquids. May have water and ice between meals after oral care, under staff or family supervision and with the recommended strategies for safe swallowing.   Recommended Strategies: Upright for PO and small bites and sips. Oral care before breakfast, after all meals and PRN.  Other Recommended Evaluations: None    Dysphagia therapy is not recommended.   Jennifer Rodriguez MS CCC-SLP 2017 9:50 AM    Visit Dx:    ICD-10-CM ICD-9-CM   1. Acute renal failure, unspecified acute renal failure type N17.9 584.9   2. Hyperkalemia E87.5 276.7   3. Overdose, undetermined intent, initial encounter T50.904A 977.9     E980.5   4. Hypoxia R09.02 799.02   5. Oropharyngeal dysphagia R13.12 787.22     Patient Active Problem List   Diagnosis   •  Acute renal failure   • Altered mental status     Past Medical History:   Diagnosis Date   • Chronic pain    • Depression    • Ethanolism    • GERD (gastroesophageal reflux disease)    • Hypertension      Past Surgical History:   Procedure Laterality Date   • GASTROSTOMY FEEDING TUBE INSERTION      was put in place and taken out in [place]    • TRACHEOSTOMY CLOSURE/STOMA REVISION            SWALLOW EVALUATION (last 72 hours)      Swallow Evaluation       06/14/17 0815                Rehab Evaluation    Document Type evaluation  -KW        Subjective Information pain  -KW        General Information    Patient Profile Review yes  -KW        Current Diet Limitations NPO  -KW        Precautions/Limitations, Vision WFL  -KW        Precautions/Limitations, Hearing WFL  -KW        Prior Level of Function- Communication functional in all spheres  -KW        Prior Level of Function- Swallowing no diet consistency restrictions  -KW        Plans/Goals Discussed With patient  -KW        Barriers to Rehab none identified  -KW        Clinical Impression    Patient's Goals For Discharge return home  -KW        Criteria for Skilled Therapeutic Interventions Met no problems identified which require skilled intervention  -KW        FCM, Swallowing 6-->Level 6  -KW        Therapy Frequency evaluation only  -KW        SLP Diet Recommendation soft textures;ground;thin liquids  -KW        Recommended Feeding/Eating Techniques small sips/bites  -KW        SLP Rec. for Method of Medication Administration meds whole with thin liquid  -KW        Monitor For Signs Of Aspiration cough;pneumonia  -KW        Anticipated Discharge Disposition home  -KW        Pain Assessment    Pain Assessment 0-10  -KW        Pain Score 10  -KW        Pain Location Neck  -KW        Oral Motor Structure and Function    Oral Motor Anatomy and Physiology patient demonstrates anatomy and physiology that is WNL  -KW        Dentition Assessment dentition  impacting ability to chew some foods   dentures but not present  -KW        Secretion Management WNL/WFL  -KW        Mucosal Quality moist, healthy  -KW        Velar Elevation WFL (within functional limits)  -KW        Volitional Swallow no difficulties initiating volitional swallow  -KW        Volitional Cough no difficulties initiating volitional cough  -KW        Oral Musculature General Assessment WFL (within functional limits)  -KW        General Feeding/Swallowing Observations    Current Feeding Method NPO  -KW        Clinical Swallow Exam    Mode of Presentation fed by clinician;spoon;straw  -KW        Oral Preparation Concerns cohesive solid:;excess chewing noted  -KW        Oral Phase Results intact oral phase without signs of dysfunction  -KW        Pharyngeal Phase Results throat clear  -KW        Summary of Clinical Exam Some increased astication but due to not having dentures.  Throat clearing several times with honey thick liquids but said once we stated that he felt like he needed to continue.  Multiple other trials of thinner consistencies showed no overt s/s of aspiration.  Asked for a soft diet due to no dentures.  Ok to upgrade to regular per pt request.  ST not warranted.  Reconsult if any concerns.  -        Swallow Recommendations    Recommended Diet regular textures;ground;thin liquids  -          User Key  (r) = Recorded By, (t) = Taken By, (c) = Cosigned By    Initials Name Effective Dates     Jennifer Rodriguez MS [facility]-SLP 08/02/16 -         EDUCATION  The patient has been educated in the following areas:   Dysphagia (Swallowing Impairment).    SLP Recommendation and Plan     SLP Diet Recommendation: soft textures, ground, thin liquids  Recommended Feeding/Eating Techniques: small sips/bites  SLP Rec. for Method of Medication Administration: meds whole with thin liquid  Monitor For Signs Of Aspiration: cough, pneumonia     Criteria for Skilled Therapeutic Interventions Met: no problems  identified which require skilled intervention  Anticipated Discharge Disposition: home     Therapy Frequency: evaluation only             Plan of Care Review  Plan Of Care Reviewed With: patient, caregiver  Progress: improving  Outcome Summary/Follow up Plan: Some increased astication but due to not having dentures. Throat clearing several times with honey thick liquids but said once we stated that he felt like he needed to continue. Multiple other trials of thinner consistencies showed no overt s/s of aspiration. Asked for a soft diet due to no dentures. Ok to upgrade to regular per pt request. ST not warranted. Reconsult if any concerns.           SLP Outcome Measures (last 72 hours)      SLP Outcome Measures       06/14/17 0900          SLP Outcome Measures    Outcome Measure Used? Adult NOMS  -      FCM Scores    FCM Chosen Swallowing  -      Swallowing FCM Score 6  -KW        User Key  (r) = Recorded By, (t) = Taken By, (c) = Cosigned By    Initials Name Effective Dates    VIANEY Rodriguez MS CCC-VIVIENNE 08/02/16 -            Time Calculation:         Time Calculation- SLP       06/14/17 0945          Time Calculation- SLP    SLP Start Time 0815  -KW      SLP Stop Time 0915  -KW      SLP Time Calculation (min) 60 min  -KW      SLP Received On 06/14/17  -KW        User Key  (r) = Recorded By, (t) = Taken By, (c) = Cosigned By    Initials Name Provider Type    VIANEY Rodriguez MS CCC-SLP Speech and Language Pathologist          Therapy Charges for Today     Code Description Service Date Service Provider Modifiers Qty    51812694182 HC ST SWALLOWING CURRENT STATUS 6/14/2017 MS OSCAR Carrero GN, CI 1    69053173071 HC ST SWALLOWING PROJECTED 6/14/2017 MS OSCAR Carrero GN, CI 1    [id number] HC ST SWALLOWING DISCHARGE 6/14/2017 MS OSCAR Carrero GN, CI 1    66408204917 HC ST EVAL ORAL PHARYNG SWALLOW 4 6/14/2017 MS OSCAR Carrero GN 1          SLP G-Codes  SLP NOMS Used?:  Yes  Functional Limitations: Swallowing  Swallow Current Status (): At least 1 percent but less than 20 percent impaired, limited or restricted  Swallow Goal Status (): At least 1 percent but less than 20 percent impaired, limited or restricted  Swallow Discharge Status (): At least 1 percent but less than 20 percent impaired, limited or restricted    SLP Discharge Summary  Anticipated Discharge Disposition: home  Reason for Discharge: At baseline function  Outcomes Achieved: Able to achieve all goals within established timeline  Discharge Destination: other (comment) (still in acute)    Jennifer Rodriguez MS CCC-SLP  6/14/2017

## 2017-06-14 NOTE — PROGRESS NOTES
Automatic IV to PO Conversion Pharmacy Note - General    Magnus Grande is a 57 y.o. male who meets the following criteria for IV to PO therapy conversion :     · Tolerating oral fluids or 40ml/hour of enteral nutrition and oral route not otherwise compromised  · Receiving other oral medications on a scheduled basis    Assessment/Plan  Based on this criteria, Protonix 40 mg IV QAM has been changed to Protonix 40 mg PO QAM per the directives and guidelines established by [facility] Pharmacy and Therapeutics Committee and [facility] Medical Executive Committee .       Og Melton [facility]  06/14/1711:19 AM

## 2017-06-14 NOTE — ED NOTES
"SPOKE WITH PT ASKED PT WHY HE HAD TAKEN SO MUCH MEDICATION, I ASKED IF HE WAS TRYING TO HURT HIMSELF. PT STATED \" NO, IT WAS AN ACCIDENT I JUST EMPTIED MY PILL BOX IN MY HAND AND DIDN'T REALIZE I HAD EMPTIED THE WHOLE BOX\".     Glenda Betancourt LPN  06/13/17 2002       Ayala Hilton RN  06/13/17 6054    "

## 2017-06-14 NOTE — H&P
History was obtained from patient with whom I spoke, endorsement from the ER physician, ER staff, ER notes, and old records.     The patient was seen at approximately 8:52 p.m. on 06/13/2017.     CHIEF COMPLAINT: Altered mental status.     HISTORY OF PRESENT ILLNESS: The patient is a 57-year-old gentleman with a history of pneumonia in the past, acute renal failure, alcohol and tobacco dependence. Please note that he is a confused historian with garbled speech, so getting a story from him is almost impossible.     What I did learn from the ER is that he came in because EMS found him unresponsive at a hotel (last known normal was not known) and the patient had reported that he took 20 tablets of either valium and/or Klonopin, but he was not trying to hurt himself. In the ER, he did get activated charcoal as per the recommendation that the ER physician had with Poison Control.     He has been relatively hemodynamically stable although he is dropping his blood pressure here to the 90s. He was given 1L of fluid but then he pulled his IV fluids out and urinated on the floor.     Workup did show a negative alcohol level. ABGs showed pH of 7.345, CO2 of 38.2, O2 of 78, and bicarbonate of 20.5. BNP was 1580. Potassium 6.7, creatinine 3.41. D-dimer is elevated at 2.22. Hemoglobin is 7.2. Please note that baseline creatinine was 0.72 about a year ago.     In terms of other symptoms, he said he has had a fever but denies any chest pain, shortness of breath, GI symptoms or blood in the stool. I attempted to ask him other questions but he becomes somnolent and does not really answer me. He is only oriented to himself and is otherwise confused and speaks with garbled speech.     PAST MEDICAL HISTORY:   1.  According to old records, he has a history of acute renal failure.  2.  Gastritis.  3.  Hypoxia with metabolic encephalopathy.   4.  Depression.   5.  Hypertension.   6.  Chronic pain syndrome.   7.  Tobacco dependence.   8.   Alcohol dependence.  9.  Anemia secondary to gastritis.  10.  History of septic shock where he was on 3 pressors.  11.  Pneumonia.   12.  History of MSSA and fungal right lung pneumonia.   13.  History of acute hypoxic hypercapnic respiratory failure.   14.  ICU delirium.   15.  History of MSSA pneumonia and bacteremia.  16.  Sepsis.   17.  Peptic ulcer disease.  18.  Alcohol abuse.  19.  Coronary artery disease.  20.  Depression.     FAMILY HISTORY: Denies any heart problems or stroke in the family.     SOCIAL HISTORY: The patient denies any alcohol, tobacco or drug abuse, although apparently there was alcohol and tobacco abuse in the past at least.     ALLERGIES: No known drug allergies.     HOME MEDICATIONS:  1.  Xanax 1 mg t.i.d.   2.  Diclofenac 75 mg b.i.d.  3.  Neurontin 300 mg t.i.d.  4.  Norco 10/325 t.i.d.  5.  Lisinopril 40 mg daily.   6.  Prilosec 20 mg b.i.d.  7.  Restoril 15 mg at nighttime.   8.  Ultram 50 mg b.i.d.     REVIEW OF SYSTEMS: All review of systems reviewed and negative and/or unobtainable except as mentioned in the HPI.     PHYSICAL EXAMINATION:  VITAL SIGNS: Pulse 114, respiratory rate 22, blood pressure 127/93, saturation 83%.   GENERAL: The patient is somnolent/groggy, no distress.   HEENT: No scleral icterus. Normal pinnae and hearing. Dry oral mucosa. Charcoal on his tongue.   NECK: No JVD or retractions.   HEART: S1, S2, with 3/6 systolic murmur. No murmur, gallop or rub.   LUNGS: Clear to auscultation bilaterally. Normal respiratory effort.  ABDOMEN: Soft, nontender, nondistended. Positive bowel sounds. No mass, hepatosplenomegaly, rebound or hernia.   EXTREMITIES: No edema.   MUSCULOSKELETAL: Normal strength and tone in all 4 extremities.   DERMATOLOGIC: No rashes, diaphoresis or jaundice.   PSYCHIATRIC: The patient is oriented to self only. He thinks it is 03/18/2018 and he does not know where he is exactly, although he belatedly mentioned hospital.     DIAGNOSTIC DATA: ABGs as  previously discussed. Troponin was negative. BNP was 1580. Sodium 141, potassium 6.7, chloride 105, bicarbonate 22. BUN and creatinine 79 and 3.41, calcium 7.9, AST and ALT are 61 and 49, respectively. TSH 1.330. Amylase 95, lipase 51. Magnesium 2.4. D-dimer 2.22. INR 1.09, PTT 27.9. White count 12.26, hemoglobin 7.2, platelets were 277,000, with 78.7 segs noted, and an absolute neutrophil count of 9.66. Urinalysis was showing only trace protein. Alcohol level was negative. Chest x-ray was read as chronic interstitial changes of pulmonary parenchyma with subtle and superimposed interstitial edema cannot be excluded. EKG shows, per my interpretation, sinus tachycardia at 105 beats per minute, no acute changes of concern, but some peak T waves in the lateral leads. This had not been seen on a previous EKG.     ASSESSMENT/PLAN:  1.  Altered mental status, probably secondary to OD, either advertent or inadvertent. We are not sure exactly what he did or did not do. His last known normal is not known. For now, we are going to expand his workup so he will be monitored very closely in the unit. We will do neurological checks on him and check NIH and TOR-BSST. Will monitor him on telemetry and provide supplemental oxygenation. He will get a head CT noncontrast en route to the unit and we will have a full toxicology panel with drug screen, Tylenol salicylate levels, ammonia level and lactic acid level. He will get IV fluid hydration.   2.  Acute renal failure, probably secondary to dehydration. IV fluid hydration. Follow up creatinine in the morning.   3.  Hyperkalemia. The patient is status post calcium and dextrose insulin in the ER. We reviewed potassium and will treat as needed.   4.  Anemia. We will transfuse 1 unit of PRBCs. He does not endorse any bleeding symptoms, although again he is extremely somnolent and therefore a very poor historian. We will make him n.p.o. and provide IV Protonix (given his history of  gastritis) and transfuse him PRBCs, monitoring for signs or symptoms of bleeding.   5.  Elevated D-dimer. CT scan of his chest with contrast is not an option because of his renal failure. Check VQ scan.          cc:          Skye Polanco M.D.  /[id number]  D:  06/13/2017 22:05:16(Eastern Time)  T:  06/13/2017 23:04:19(Eastern Time)  Voice ID:  [id number]/Document ID:  [id number]

## 2017-06-15 LAB
ABO + RH BLD: NORMAL
ALBUMIN SERPL-MCNC: 3.7 G/DL (ref 3.5–5)
ALBUMIN/GLOB SERPL: 1.1 G/DL (ref 1.1–2.5)
ALP SERPL-CCNC: 79 U/L (ref 24–120)
ALT SERPL W P-5'-P-CCNC: 50 U/L (ref 0–54)
ANION GAP SERPL CALCULATED.3IONS-SCNC: 12 MMOL/L (ref 4–13)
ANISOCYTOSIS BLD QL: NORMAL
AST SERPL-CCNC: 52 U/L (ref 7–45)
BASOPHILS # BLD AUTO: 0.03 10*3/MM3 (ref 0–0.2)
BASOPHILS NFR BLD AUTO: 0.4 % (ref 0–2)
BH BB BLOOD EXPIRATION DATE: NORMAL
BH BB BLOOD TYPE BARCODE: 6200
BH BB DISPENSE STATUS: NORMAL
BH BB PRODUCT CODE: NORMAL
BH BB UNIT NUMBER: NORMAL
BILIRUB SERPL-MCNC: 0.4 MG/DL (ref 0.1–1)
BUN BLD-MCNC: 23 MG/DL (ref 5–21)
BUN/CREAT SERPL: 27.7 (ref 7–25)
CALCIUM SPEC-SCNC: 8.9 MG/DL (ref 8.4–10.4)
CHLORIDE SERPL-SCNC: 106 MMOL/L (ref 98–110)
CO2 SERPL-SCNC: 25 MMOL/L (ref 24–31)
CREAT BLD-MCNC: 0.83 MG/DL (ref 0.5–1.4)
CROSSMATCH INTERPRETATION: NORMAL
DEPRECATED RDW RBC AUTO: 56.2 FL (ref 40–54)
EOSINOPHIL # BLD AUTO: 0.26 10*3/MM3 (ref 0–0.7)
EOSINOPHIL NFR BLD AUTO: 3.6 % (ref 0–4)
ERYTHROCYTE [DISTWIDTH] IN BLOOD BY AUTOMATED COUNT: 21.9 % (ref 12–15)
GFR SERPL CREATININE-BSD FRML MDRD: 96 ML/MIN/1.73
GLOBULIN UR ELPH-MCNC: 3.5 GM/DL
GLUCOSE BLD-MCNC: 78 MG/DL (ref 70–100)
HAV IGM SERPL QL IA: NEGATIVE
HBV CORE IGM SERPL QL IA: NEGATIVE
HBV SURFACE AG SERPL QL IA: NEGATIVE
HCT VFR BLD AUTO: 28 % (ref 40–52)
HCV AB SER DONR QL: NEGATIVE
HCV S/C RATIO: 0.11 (ref 0–0.99)
HGB BLD-MCNC: 8 G/DL (ref 14–18)
IMM GRANULOCYTES # BLD: 0.09 10*3/MM3 (ref 0–0.03)
IMM GRANULOCYTES NFR BLD: 1.3 % (ref 0–5)
LYMPHOCYTES # BLD AUTO: 1.81 10*3/MM3 (ref 0.72–4.86)
LYMPHOCYTES NFR BLD AUTO: 25.1 % (ref 15–45)
MCH RBC QN AUTO: 20.5 PG (ref 28–32)
MCHC RBC AUTO-ENTMCNC: 28.6 G/DL (ref 33–36)
MCV RBC AUTO: 71.8 FL (ref 82–95)
MICROCYTES BLD QL: NORMAL
MONOCYTES # BLD AUTO: 0.72 10*3/MM3 (ref 0.19–1.3)
MONOCYTES NFR BLD AUTO: 10 % (ref 4–12)
NEUTROPHILS # BLD AUTO: 4.29 10*3/MM3 (ref 1.87–8.4)
NEUTROPHILS NFR BLD AUTO: 59.6 % (ref 39–78)
PLAT MORPH BLD: NORMAL
PLATELET # BLD AUTO: 240 10*3/MM3 (ref 130–400)
PMV BLD AUTO: 9.5 FL (ref 6–12)
POIKILOCYTOSIS BLD QL SMEAR: NORMAL
POTASSIUM BLD-SCNC: 4.7 MMOL/L (ref 3.5–5.3)
PROT SERPL-MCNC: 7.2 G/DL (ref 6.3–8.7)
RBC # BLD AUTO: 3.9 10*6/MM3 (ref 4.8–5.9)
SODIUM BLD-SCNC: 143 MMOL/L (ref 135–145)
UNIT  ABO: NORMAL
UNIT  RH: NORMAL
WBC MORPH BLD: NORMAL
WBC NRBC COR # BLD: 7.2 10*3/MM3 (ref 4.8–10.8)

## 2017-06-15 PROCEDURE — 80074 ACUTE HEPATITIS PANEL: CPT | Performed by: FAMILY MEDICINE

## 2017-06-15 PROCEDURE — 85007 BL SMEAR W/DIFF WBC COUNT: CPT | Performed by: FAMILY MEDICINE

## 2017-06-15 PROCEDURE — 80053 COMPREHEN METABOLIC PANEL: CPT | Performed by: FAMILY MEDICINE

## 2017-06-15 PROCEDURE — 25010000002 THIAMINE PER 100 MG: Performed by: FAMILY MEDICINE

## 2017-06-15 PROCEDURE — 85025 COMPLETE CBC W/AUTO DIFF WBC: CPT | Performed by: FAMILY MEDICINE

## 2017-06-15 RX ORDER — HYDROCODONE BITARTRATE AND ACETAMINOPHEN 7.5; 325 MG/1; MG/1
1 TABLET ORAL EVERY 6 HOURS PRN
Status: DISCONTINUED | OUTPATIENT
Start: 2017-06-15 | End: 2017-06-15

## 2017-06-15 RX ORDER — HYDROCODONE BITARTRATE AND ACETAMINOPHEN 7.5; 325 MG/1; MG/1
1 TABLET ORAL EVERY 6 HOURS PRN
Status: DISCONTINUED | OUTPATIENT
Start: 2017-06-15 | End: 2017-06-24 | Stop reason: HOSPADM

## 2017-06-15 RX ADMIN — SODIUM CHLORIDE 125 ML/HR: 9 INJECTION, SOLUTION INTRAVENOUS at 22:31

## 2017-06-15 RX ADMIN — HYDROCODONE BITARTRATE AND ACETAMINOPHEN 1 TABLET: 7.5; 325 TABLET ORAL at 21:45

## 2017-06-15 RX ADMIN — TRAMADOL HYDROCHLORIDE 50 MG: 50 TABLET, COATED ORAL at 09:26

## 2017-06-15 RX ADMIN — GABAPENTIN 300 MG: 300 CAPSULE ORAL at 21:45

## 2017-06-15 RX ADMIN — NICOTINE 1 PATCH: 21 PATCH, EXTENDED RELEASE TRANSDERMAL at 08:40

## 2017-06-15 RX ADMIN — ACETAMINOPHEN 650 MG: 325 TABLET, FILM COATED ORAL at 10:58

## 2017-06-15 RX ADMIN — GABAPENTIN 300 MG: 300 CAPSULE ORAL at 15:32

## 2017-06-15 RX ADMIN — GABAPENTIN 300 MG: 300 CAPSULE ORAL at 05:17

## 2017-06-15 RX ADMIN — CHLORDIAZEPOXIDE HYDROCHLORIDE 50 MG: 25 CAPSULE ORAL at 23:02

## 2017-06-15 RX ADMIN — CHLORDIAZEPOXIDE HYDROCHLORIDE 50 MG: 25 CAPSULE ORAL at 11:00

## 2017-06-15 RX ADMIN — HYDROCODONE BITARTRATE AND ACETAMINOPHEN 1 TABLET: 7.5; 325 TABLET ORAL at 15:24

## 2017-06-15 RX ADMIN — TRAMADOL HYDROCHLORIDE 50 MG: 50 TABLET, COATED ORAL at 17:31

## 2017-06-15 RX ADMIN — ACETAMINOPHEN 650 MG: 325 TABLET, FILM COATED ORAL at 02:48

## 2017-06-15 RX ADMIN — CHLORDIAZEPOXIDE HYDROCHLORIDE 50 MG: 25 CAPSULE ORAL at 05:17

## 2017-06-15 RX ADMIN — CHLORDIAZEPOXIDE HYDROCHLORIDE 50 MG: 25 CAPSULE ORAL at 17:31

## 2017-06-15 RX ADMIN — QUETIAPINE FUMARATE 300 MG: 200 TABLET, FILM COATED ORAL at 22:31

## 2017-06-15 RX ADMIN — HYDROCODONE BITARTRATE AND ACETAMINOPHEN 1 TABLET: 5; 325 TABLET ORAL at 08:39

## 2017-06-15 RX ADMIN — FOLIC ACID 125 ML/HR: 5 INJECTION, SOLUTION INTRAMUSCULAR; INTRAVENOUS; SUBCUTANEOUS at 09:26

## 2017-06-15 RX ADMIN — SODIUM CHLORIDE 125 ML/HR: 9 INJECTION, SOLUTION INTRAVENOUS at 00:42

## 2017-06-15 RX ADMIN — HYDROCODONE BITARTRATE AND ACETAMINOPHEN 1 TABLET: 5; 325 TABLET ORAL at 00:42

## 2017-06-15 RX ADMIN — PANTOPRAZOLE SODIUM 40 MG: 40 TABLET, DELAYED RELEASE ORAL at 05:17

## 2017-06-15 NOTE — PROGRESS NOTES
Continued Stay Note  [facility]     Patient Name: Magnus Grande  MRN: [id number]  Today's Date: 6/15/2017    Admit Date: 6/13/2017          Discharge Plan       06/15/17 1008    Case Management/Social Work Plan    Plan  SATINDER COUNSELOR DID EVAL ON 6-14-17 AT 1600. PT HAS BEEN CLEARED FROM [facility]. OUTPT RECOMMENDED. PT PLANS TO DC BACK TO [facility] [place]. WILL CONTINUE TO FOLLOW.               Discharge Codes     None            IRIS Diallo

## 2017-06-15 NOTE — PROGRESS NOTES
Continued Stay Note  [facility]     Patient Name: Magnus Grande  MRN: [id number]  Today's Date: 6/15/2017    Admit Date: 6/13/2017          Discharge Plan       06/15/17 1512    Case Management/Social Work Plan    Plan PT WAS READY FOR DC AND THEN STATED IF HE HAS TO LEAVE, HE WILL KILL HIMSELF. SPOKE TO PT AND HE CONFIRMS HE IS SUICIDAL. PT SISTER IS IN ROOM WITH PT AS WELL AS RN (TIM). INFORMED DR RAY. PT IS REQUESTING TO BE TRANSFERRED TO [facility] AS HE HAS BEEN THERE MULTIPLE TIMES IN THE PAST. CALLED [facility] AND SPOKE TO AMADEO. AMADEO REQUESTED INFO BE FAXED TO HER AT [phone] AND THE PHYSICIAN WOULD REVIEW. FAXED INFO, AWAIT ANSWER. PT IS BEING MOVED TO ANOTHER ROOM.     Patient/Family In Agreement With Plan yes              Discharge Codes     None            IRIS Diallo

## 2017-06-15 NOTE — PLAN OF CARE
Problem: Patient Care Overview (Adult)  Goal: Plan of Care Review  Outcome: Ongoing (interventions implemented as appropriate)    06/15/17 0217   Coping/Psychosocial Response Interventions   Plan Of Care Reviewed With patient   Patient Care Overview   Progress improving   Outcome Evaluation   Outcome Summary/Follow up Plan Patient tolerating regular diet. Patient c/o pain that is decreased with PRN pain medication. Patient without c/o n/v. Voiding without difficulty. Up with assist x1.        Goal: Adult Individualization and Mutuality  Outcome: Ongoing (interventions implemented as appropriate)    06/15/17 0217   Individualization   Patient Specific Preferences None at this time.   Patient Specific Goals The patient would like to get better and go home.   Patient Specific Interventions Medications administered per prescription parameters.   Mutuality/Individual Preferences   What Anxieties, Fears or Concerns Do You Have About Your Health or Care? None at this time.   What Questions Do You Have About Your Health or Care? None at this time.   What Information Would Help Us Give You More Personalized Care? none at this time.       Goal: Discharge Needs Assessment  Outcome: Ongoing (interventions implemented as appropriate)    06/14/17 1139   Discharge Needs Assessment   Concerns To Be Addressed care coordination/care conferences   Readmission Within The Last 30 Days current reason for admission unrelated to previous admission   Equipment Needed After Discharge none   Current Discharge Risk substance abuse;history of non-alliance   Discharge Planning Comments Spoke to patient regarding current living arrangements. Patient stated he is residing at [facility] in [place]. OMAR contacted [facility] and spoke with Clifford Bynum, who advised patient had actually left [facility] on May 25th and hasn't been back since. Therefore, patient is not a current resident at [facility] in [place]. OMAR went back to speak to  patient regarding living arrangements and he had received Librium and was sleeping heavily, did not wake him. Patient has [facility] consult.    Living Environment   Transportation Available car;family or friend will provide;public transportation   Self-Care   Equipment Currently Used at Home none         Problem: Fall Risk (Adult)  Goal: Identify Related Risk Factors and Signs and Symptoms  Outcome: Ongoing (interventions implemented as appropriate)    06/15/17 0217   Fall Risk   Fall Risk: Related Risk Factors depression/anxiety;culprit medication(s)   Fall Risk: Signs and Symptoms presence of risk factors       Goal: Absence of Falls  Outcome: Ongoing (interventions implemented as appropriate)    06/15/17 0217   Fall Risk (Adult)   Absence of Falls making progress toward outcome         Problem: Renal Failure/Kidney Injury, Acute (Adult)  Goal: Signs and Symptoms of Listed Potential Problems Will be Absent or Manageable (Renal Failure/Kidney Injury, Acute)  Outcome: Ongoing (interventions implemented as appropriate)    06/14/17 0457 06/15/17 0217   Renal Failure/Kidney Injury, Acute   Problems Assessed (Acute Renal Failure/Kidney Injury) --  all   Problems Present (Acute Renal Failure/Kidney Injury) drug/nephrotoxicity;electrolyte imbalance;situational response --          Problem: Depression (Adult,Obstetrics,Pediatric)  Goal: Identify Related Risk Factors and Signs and Symptoms  Outcome: Ongoing (interventions implemented as appropriate)    06/14/17 0457   Depression   Related Risk Factors (Depression) abuse history;history of depression;psychosocial factor;substance use/abuse   Signs and Symptoms (Depression) disruptive/aggressive behavior;poor concentration/indecisiveness;sadness;self-blame/self-criticism/guilt;substance use/abuse;suicidal/homicidal behaviors/thoughts;irritability       Goal: Establish/Maintain Self-Care Routine  Outcome: Ongoing (interventions implemented as appropriate)    06/15/17 0217    Depression (Adult,Obstetrics,Pediatric)   Establish/Maintain Self-Care Routine making progress toward outcome       Goal: Improved/Stable Mood  Outcome: Ongoing (interventions implemented as appropriate)    06/15/17 0217   Depression (Adult,Obstetrics,Pediatric)   Improved/Stable Mood making progress toward outcome         Problem: Overdose, Ingestion/Inhalants (Adult)  Goal: Signs and Symptoms of Listed Potential Problems Will be Absent or Manageable (Overdose, Ingestion/Inhalants)  Outcome: Ongoing (interventions implemented as appropriate)    06/15/17 0217   Overdose, Ingestion/Inhalants   Problems Assessed (Overdose) all   Problems Present (Overdose) fluid/electrolyte imbalance

## 2017-06-15 NOTE — PROGRESS NOTES
[facility] Medicine Services  INPATIENT PROGRESS NOTE    Length of Stay: 2  Date of Admission: 6/13/2017  Primary Care Physician: No Known Provider    Subjective   Chief Complaint: Suicidal thoughts    HPI   Patient has suicidal thoughts.  Patient has multiple suicide attempts in the past.  Plan for suicide watch.  Laboratory workup has been discussed patient.  Chronic pain, put patient back as chronic pain medication.  Suicidal watch.  Review of Systems   Constitutional: Negative for activity change, appetite change, chills and fever.   HENT: Negative for hearing loss, nosebleeds, tinnitus and trouble swallowing.    Eyes: Negative for visual disturbance.   Respiratory: Negative for cough, chest tightness, shortness of breath and wheezing.    Cardiovascular: Negative for chest pain, palpitations and leg swelling.   Gastrointestinal: Negative for abdominal distention, abdominal pain, blood in stool, constipation, diarrhea, nausea and vomiting.   Endocrine: Negative for cold intolerance, heat intolerance, polydipsia, polyphagia and polyuria.   Genitourinary: Negative for decreased urine volume, difficulty urinating, dysuria, flank pain, frequency and hematuria.   Musculoskeletal: Positive for arthralgias, back pain, myalgias and neck pain. Negative for joint swelling.   Skin: Negative for rash.   Allergic/Immunologic: Negative for immunocompromised state.   Neurological: Negative for dizziness, syncope, weakness, light-headedness and headaches.   Hematological: Negative for adenopathy. Does not bruise/bleed easily.   Psychiatric/Behavioral: Negative for confusion and sleep disturbance. The patient is not nervous/anxious.           All pertinent negatives and positives are as above. All other systems have been reviewed and are negative unless otherwise stated.     Objective    Temp:  [97.6 °F (36.4 °C)-98.6 °F (37 °C)] 97.6 °F (36.4 °C)  Heart Rate:  [61-77] 61  Resp:  [16-18]  16  BP: (125-161)/(70-98) 152/98    Intake/Output Summary (Last 24 hours) at 06/15/17 1647  Last data filed at 06/15/17 1100   Gross per 24 hour   Intake             1163 ml   Output             1900 ml   Net             -737 ml     Physical Exam   Constitutional: He is oriented to person, place, and time. He appears well-developed and well-nourished.   HENT:   Head: Normocephalic and atraumatic.   Eyes: Conjunctivae and EOM are normal. Pupils are equal, round, and reactive to light.   Neck: Neck supple. No JVD present. No thyromegaly present.   Cardiovascular: Normal rate, regular rhythm, normal heart sounds and intact distal pulses.  Exam reveals no gallop and no friction rub.    No murmur heard.  Pulmonary/Chest: Effort normal and breath sounds normal. No respiratory distress. He has no wheezes. He has no rales. He exhibits no tenderness.   Abdominal: Soft. Bowel sounds are normal. He exhibits no distension. There is no tenderness. There is no rebound and no guarding.   Musculoskeletal: Normal range of motion. He exhibits no edema, tenderness or deformity.   Lymphadenopathy:     He has no cervical adenopathy.   Neurological: He is alert and oriented to person, place, and time. He displays normal reflexes. No cranial nerve deficit. He exhibits normal muscle tone.   Skin: Skin is warm and dry. No rash noted.   Psychiatric: He has a normal mood and affect. His behavior is normal. Judgment and thought content normal.   Nursing note and vitals reviewed.      Results Review:  Lab Results (last 24 hours)     Procedure Component Value Units Date/Time    Comprehensive Metabolic Panel [[id number]]  (Abnormal) Collected:  06/15/17 0413    Specimen:  Blood Updated:  06/15/17 0546     Glucose 78 mg/dL      BUN 23 (H) mg/dL      Creatinine 0.83 mg/dL      Sodium 143 mmol/L      Potassium 4.7 mmol/L      Chloride 106 mmol/L      CO2 25.0 mmol/L      Calcium 8.9 mg/dL      Total Protein 7.2 g/dL      Albumin 3.70 g/dL      ALT  (SGPT) 50 U/L      AST (SGOT) 52 (H) U/L      Alkaline Phosphatase 79 U/L      Total Bilirubin 0.4 mg/dL      eGFR Non African Amer 96 mL/min/1.73      Globulin 3.5 gm/dL      A/G Ratio 1.1 g/dL      BUN/Creatinine Ratio 27.7 (H)     Anion Gap 12.0 mmol/L     CBC Auto Differential [[id number]]  (Abnormal) Collected:  06/15/17 0412    Specimen:  Blood Updated:  06/15/17 0557     WBC 7.20 10*3/mm3      RBC 3.90 (L) 10*6/mm3      Hemoglobin 8.0 (L) g/dL      Hematocrit 28.0 (L) %      MCV 71.8 (L) fL      MCH 20.5 (L) pg      MCHC 28.6 (L) g/dL      RDW 21.9 (H) %      RDW-SD 56.2 (H) fl      MPV 9.5 fL      Platelets 240 10*3/mm3      Neutrophil % 59.6 %      Lymphocyte % 25.1 %      Monocyte % 10.0 %      Eosinophil % 3.6 %      Basophil % 0.4 %      Immature Grans % 1.3 %      Neutrophils, Absolute 4.29 10*3/mm3      Lymphocytes, Absolute 1.81 10*3/mm3      Monocytes, Absolute 0.72 10*3/mm3      Eosinophils, Absolute 0.26 10*3/mm3      Basophils, Absolute 0.03 10*3/mm3      Immature Grans, Absolute 0.09 (H) 10*3/mm3     CBC & Differential [[id number]] Collected:  06/15/17 0412    Specimen:  Blood Updated:  06/15/17 0557    Narrative:       The following orders were created for panel order CBC & Differential.  Procedure                               Abnormality         Status                     ---------                               -----------         ------                     Scan Slide[[id number]]                                       Final result               CBC Auto Differential[[id number]]        Abnormal            Final result                 Please view results for these tests on the individual orders.    Scan Slide [[id number]] Collected:  06/15/17 0412    Specimen:  Blood Updated:  06/15/17 0557     Anisocytosis Slight/1+     Microcytes Slight/1+     Poikilocytes Slight/1+     WBC Morphology Normal     Platelet Morphology Normal    Hepatitis Panel, Acute [[id number]]  (Normal) Collected:  06/15/17 0412     Specimen:  Blood Updated:  06/15/17 0617     HCV S/C Ratio 0.11     Hepatitis C Ab Negative     Hep A IgM Negative     Hep B C IgM Negative     Hepatitis B Surface Ag Negative    Urine Culture [[id number]]  (Normal) Collected:  06/14/17 0019    Specimen:  Urine from Urine, Clean Catch Updated:  06/15/17 0637     Urine Culture No growth at 24 hours           Cultures:  Urine Culture   Date Value Ref Range Status   06/14/2017 No growth at 24 hours  Preliminary       Radiology Data:    Imaging Results (last 24 hours)     ** No results found for the last 24 hours. **          No Known Allergies    Scheduled meds:     chlordiazePOXIDE 50 mg Oral Q6H   gabapentin 300 mg Oral Q8H   IV Fluids 1000 mL + additives 125 mL/hr Intravenous Daily   nicotine 1 patch Transdermal Daily   pantoprazole 40 mg Oral Q AM   traMADol 50 mg Oral BID       PRN meds:  HYDROcodone-acetaminophen  •  ondansetron    Assessment/Plan     Active Problems:    Acute renal failure    Altered mental status      Plan:  Altered mental status/drug overdose- Ct head, no acute change.  Tylenol and salicylate levels - low, ammonia level and lactic acid level- low.  Patient threating suicidal.  Family requested transfer to [facility] for further evaluation.  Suicidal watch for now.     renal failure -  probably secondary to dehydration. Improving with IV fluid hydration.      Hyperkalemia-  status post calcium and dextrose insulin in the ER. Improve today.      Chronic pain-put patient back as Ultram and decrease the dose of Norco. Continue Neurontin.     Anemia- S/P transfuse 1 unit of PRBCs. Stable.   Elevated D-dimer. V/Q, low probability.     Alcohol abuse-last drink was 2 days ago, start Librium     Diet regular     Discharge Planning: Possible plans, transfer patient to [facility] psychiatric unit or [facility] evaluation.    Mynor Kaye MD   06/15/17   4:47 PM

## 2017-06-15 NOTE — DISCHARGE PLACEMENT REQUEST
"Becky Alvarado (57 y.o. Male)     Date of Birth Social Security Number Address Home Phone MRN    1960  [address] [facility] [address] [phone] [id number]    [facility] Marital Status          Unknown        Admission Date Admission Type Admitting Provider Attending Provider Department, Room/Bed    6/13/17 Emergency Mynor Kaye MD Truong, Khai C, MD [facility] 3C, 394/1    Discharge Date Discharge Disposition Discharge Destination                      Attending Provider: Mynor Kaye MD     Allergies:  No Known Allergies    Isolation:  None   Infection:  None   Code Status:  Not on file    Ht:  71\" (180.3 cm)   Wt:  227 lb 9.6 oz (103 kg)    Admission Cmt:  None   Principal Problem:  None                Active Insurance as of 6/13/2017     Primary Coverage     Payor Plan Insurance Group Employer/Plan Group    MEDICARE MEDICARE A & B      Payor Plan Address Payor Plan Phone Number Effective From Effective To    [address] [phone] 7/1/2010     [place] [address]       Subscriber Name Subscriber Birth Date Member ID       BECKY ALVARADO 1960 [id number]           Secondary Coverage     Payor Plan Insurance Group Employer/Plan Group    HUMANA MEDICAID HUMANA CARESOURCE [facility]     Payor Plan Address Payor Plan Phone Number Effective From Effective To    [address]  [phone] 6/2/2017     [place] [address]       Subscriber Name Subscriber Birth Date Member ID       BECKY ALVARADO 1960 [id number]                 Emergency Contacts      (Rel.) Home Phone Work Phone Mobile Phone    Neil Alvarado (Father) [phone] -- --            Emergency Contact Information     Name Relation Home Work Mobile    Neil Alvarado Father [phone]            Insurance Information                MEDICARE/MEDICARE A & B Phone: [phone]    Subscriber: AlvaradoBecky A Subscriber#: [id number]    Group#:  Precert#:         HUMANA MEDICAID/HUMANA CARESOURCE Phone: " [phone]    Subscriber: [place] Subscriber#: [id number]    Group#: [id number] Precert#:           Problem List           Codes Noted - Resolved       Hospital    Acute renal failure ICD-10-CM: N17.9  ICD-9-CM: 584.9 6/13/2017 - Present    Altered mental status ICD-10-CM: R41.82  ICD-9-CM: 780.97 6/13/2017 - Present             History & Physical      H&P signed by Skye Polanco MD at 6/15/2017  8:17 AM              History was obtained from patient with whom I spoke, endorsement from the ER physician, ER staff, ER notes, and old records.     The patient was seen at approximately 8:52 p.m. on 06/13/2017.     CHIEF COMPLAINT: Altered mental status.     HISTORY OF PRESENT ILLNESS: The patient is a 57-year-old gentleman with a history of pneumonia in the past, acute renal failure, alcohol and tobacco dependence. Please note that he is a confused historian with garbled speech, so getting a story from him is almost impossible.     What I did learn from the ER is that he came in because EMS found him unresponsive at a hotel (last known normal was not known) and the patient had reported that he took 20 tablets of either valium and/or Klonopin, but he was not trying to hurt himself. In the ER, he did get activated charcoal as per the recommendation that the ER physician had with Poison Control.     He has been relatively hemodynamically stable although he is dropping his blood pressure here to the 90s. He was given 1L of fluid but then he pulled his IV fluids out and urinated on the floor.     Workup did show a negative alcohol level. ABGs showed pH of 7.345, CO2 of 38.2, O2 of 78, and bicarbonate of 20.5. BNP was 1580. Potassium 6.7, creatinine 3.41. D-dimer is elevated at 2.22. Hemoglobin is 7.2. Please note that baseline creatinine was 0.72 about a year ago.     In terms of other symptoms, he said he has had a fever but denies any chest pain, shortness of breath, GI symptoms or blood in the stool. I attempted to ask  him other questions but he becomes somnolent and does not really answer me. He is only oriented to himself and is otherwise confused and speaks with garbled speech.     PAST MEDICAL HISTORY:   1.  According to old records, he has a history of acute renal failure.  2.  Gastritis.  3.  Hypoxia with metabolic encephalopathy.   4.  Depression.   5.  Hypertension.   6.  Chronic pain syndrome.   7.  Tobacco dependence.   8.  Alcohol dependence.  9.  Anemia secondary to gastritis.  10.  History of septic shock where he was on 3 pressors.  11.  Pneumonia.   12.  History of MSSA and fungal right lung pneumonia.   13.  History of acute hypoxic hypercapnic respiratory failure.   14.  ICU delirium.   15.  History of MSSA pneumonia and bacteremia.  16.  Sepsis.   17.  Peptic ulcer disease.  18.  Alcohol abuse.  19.  Coronary artery disease.  20.  Depression.     FAMILY HISTORY: Denies any heart problems or stroke in the family.     SOCIAL HISTORY: The patient denies any alcohol, tobacco or drug abuse, although apparently there was alcohol and tobacco abuse in the past at least.     ALLERGIES: No known drug allergies.     HOME MEDICATIONS:  1.  Xanax 1 mg t.i.d.   2.  Diclofenac 75 mg b.i.d.  3.  Neurontin 300 mg t.i.d.  4.  Norco 10/325 t.i.d.  5.  Lisinopril 40 mg daily.   6.  Prilosec 20 mg b.i.d.  7.  Restoril 15 mg at nighttime.   8.  Ultram 50 mg b.i.d.     REVIEW OF SYSTEMS: All review of systems reviewed and negative and/or unobtainable except as mentioned in the HPI.     PHYSICAL EXAMINATION:  VITAL SIGNS: Pulse 114, respiratory rate 22, blood pressure 127/93, saturation 83%.   GENERAL: The patient is somnolent/groggy, no distress.   HEENT: No scleral icterus. Normal pinnae and hearing. Dry oral mucosa. Charcoal on his tongue.   NECK: No JVD or retractions.   HEART: S1, S2, with 3/6 systolic murmur. No murmur, gallop or rub.   LUNGS: Clear to auscultation bilaterally. Normal respiratory effort.  ABDOMEN: Soft, nontender,  nondistended. Positive bowel sounds. No mass, hepatosplenomegaly, rebound or hernia.   EXTREMITIES: No edema.   MUSCULOSKELETAL: Normal strength and tone in all 4 extremities.   DERMATOLOGIC: No rashes, diaphoresis or jaundice.   PSYCHIATRIC: The patient is oriented to self only. He thinks it is 03/18/2018 and he does not know where he is exactly, although he belatedly mentioned “hospital.”     DIAGNOSTIC DATA: ABGs as previously discussed. Troponin was negative. BNP was 1580. Sodium 141, potassium 6.7, chloride 105, bicarbonate 22. BUN and creatinine 79 and 3.41, calcium 7.9, AST and ALT are 61 and 49, respectively. TSH 1.330. Amylase 95, lipase 51. Magnesium 2.4. D-dimer 2.22. INR 1.09, PTT 27.9. White count 12.26, hemoglobin 7.2, platelets were 277,000, with 78.7 segs noted, and an absolute neutrophil count of 9.66. Urinalysis was showing only trace protein. Alcohol level was negative. Chest x-ray was read as chronic interstitial changes of pulmonary parenchyma with subtle and superimposed interstitial edema cannot be excluded. EKG shows, per my interpretation, sinus tachycardia at 105 beats per minute, no acute changes of concern, but some peak T waves in the lateral leads. This had not been seen on a previous EKG.     ASSESSMENT/PLAN:  1.  Altered mental status, probably secondary to OD, either advertent or inadvertent. We are not sure exactly what he did or did not do. His last known normal is not known. For now, we are going to expand his workup so he will be monitored very closely in the unit. We will do neurological checks on him and check NIH and TOR-BSST. Will monitor him on telemetry and provide supplemental oxygenation. He will get a head CT noncontrast en route to the unit and we will have a full toxicology panel with drug screen, Tylenol salicylate levels, ammonia level and lactic acid level. He will get IV fluid hydration.   2.  Acute renal failure, probably secondary to dehydration. IV fluid  hydration. Follow up creatinine in the morning.   3.  Hyperkalemia. The patient is status post calcium and dextrose insulin in the ER. We reviewed potassium and will treat as needed.   4.  Anemia. We will transfuse 1 unit of PRBCs. He does not endorse any bleeding symptoms, although again he is extremely somnolent and therefore a very poor historian. We will make him n.p.o. and provide IV Protonix (given his history of gastritis) and transfuse him PRBCs, monitoring for signs or symptoms of bleeding.   5.  Elevated D-dimer. CT scan of his chest with contrast is not an option because of his renal failure. Check VQ scan.          cc:          Skye Polanco M.D.  /[id number]  D:  06/13/2017 22:05:16(Eastern Time)  T:  06/13/2017 23:04:19(Eastern Time)  Voice ID:  [id number]/Document ID:  [id number]     Electronically signed by Skye Polanco MD at 6/15/2017  8:17 AM        Hospital Medications (active)       Dose Frequency Start End    acetaminophen (TYLENOL) tablet 650 mg 650 mg Every 6 Hours PRN 6/14/2017     Sig - Route: Take 2 tablets by mouth Every 6 (Six) Hours As Needed for Mild Pain (1-3). - Oral    chlordiazePOXIDE (LIBRIUM) capsule 50 mg 50 mg Every 6 Hours Scheduled 6/14/2017 6/24/2017    Sig - Route: Take 2 capsules by mouth Every 6 (Six) Hours. - Oral    gabapentin (NEURONTIN) capsule 300 mg 300 mg Every 8 Hours Scheduled 6/14/2017     Sig - Route: Take 1 capsule by mouth Every 8 (Eight) Hours. - Oral    HYDROcodone-acetaminophen (NORCO) 5-325 MG per tablet 1 tablet 1 tablet Every 8 Hours PRN 6/14/2017 6/24/2017    Sig - Route: Take 1 tablet by mouth Every 8 (Eight) Hours As Needed for Moderate Pain (4-6). - Oral    multiple vitamin (M.V.I. Adult) 10 mL, thiamine (B-1) 100 mg, folic acid 1 mg in sodium chloride 0.9 % 1,000 mL infusion 125 mL/hr Daily 6/14/2017     Sig - Route: Infuse 125 mL/hr into a venous catheter Daily. - Intravenous    nicotine (NICODERM CQ) 21 MG/24HR patch 1 patch 1 patch Daily 6/14/2017      Sig - Route: Place 1 patch on the skin Daily. - Transdermal    ondansetron (ZOFRAN) injection 4 mg 4 mg Every 6 Hours PRN 6/13/2017     Sig - Route: Infuse 2 mL into a venous catheter Every 6 (Six) Hours As Needed for Nausea or Vomiting. - Intravenous    pantoprazole (PROTONIX) EC tablet 40 mg 40 mg Every Early Morning 6/15/2017     Sig - Route: Take 1 tablet by mouth Every Morning. - Oral    sodium chloride 0.9 % infusion 125 mL/hr Continuous 6/13/2017     Sig - Route: Infuse 125 mL/hr into a venous catheter Continuous. - Intravenous    traMADol (ULTRAM) tablet 50 mg 50 mg 2 Times Daily 6/14/2017     Sig - Route: Take 1 tablet by mouth 2 (Two) Times a Day. - Oral          Consult Orders (last 72 hours) (72h ago through future)    Start     Ordered    06/14/17 0843  Inpatient Consult to Psychiatrist  Once     Specialty:  Psychiatry  Provider:  Augusta Lutz,     06/14/17 0842          Operative/Procedure Notes (last 7 days) (Notes from 6/8/2017  2:32 PM through 6/15/2017  2:32 PM)     No notes of this type exist for this encounter.           Physician Progress Notes (last 72 hours) (Notes from 6/12/2017  2:32 PM through 6/15/2017  2:32 PM)      Mynor Kaye MD at 6/14/2017  8:19 AM  Version 2 of 2             [facility] Medicine Services  INPATIENT PROGRESS NOTE    Length of Stay: 1  Date of Admission: 6/13/2017  Primary Care Physician: No Known Provider    Subjective   Chief Complaint: Drug overdose, acute kidney failure, hyperkalemia    HPI   Patient's awake, alert.  Oriented ×3.  Denies any chest pain or shortness breath. Denies any suicidal, homicidal thoughts.  Last alcohol drink was 2 days ago.  Patient had a pint of vodka.  No sign of withdrawal at this time.    Review of Systems   Constitutional: Positive for fatigue. Negative for activity change, appetite change, chills and fever.   HENT: Negative for hearing loss, nosebleeds, tinnitus and trouble swallowing.     Eyes: Negative for visual disturbance.   Respiratory: Negative for cough, chest tightness, shortness of breath and wheezing.    Cardiovascular: Negative for chest pain, palpitations and leg swelling.   Gastrointestinal: Negative for abdominal distention, abdominal pain, blood in stool, constipation, diarrhea, nausea and vomiting.   Endocrine: Negative for cold intolerance, heat intolerance, polydipsia, polyphagia and polyuria.   Genitourinary: Negative for decreased urine volume, difficulty urinating, dysuria, flank pain, frequency and hematuria.   Musculoskeletal: Negative for arthralgias, joint swelling and myalgias.   Skin: Negative for rash.   Allergic/Immunologic: Negative for immunocompromised state.   Neurological: Negative for dizziness, syncope, light-headedness and headaches.   Hematological: Negative for adenopathy. Does not bruise/bleed easily.   Psychiatric/Behavioral: Positive for agitation and confusion. Negative for sleep disturbance. The patient is not nervous/anxious.           All pertinent negatives and positives are as above. All other systems have been reviewed and are negative unless otherwise stated.     Objective    Temp:  [98.9 °F (37.2 °C)-100.1 °F (37.8 °C)] 98.9 °F (37.2 °C)  Heart Rate:  [] 86  Resp:  [18-22] 18  BP: ()/() 122/94    Intake/Output Summary (Last 24 hours) at 06/14/17 0820  Last data filed at 06/14/17 0430   Gross per 24 hour   Intake              619 ml   Output             1300 ml   Net             -681 ml     Physical Exam   Constitutional: He is oriented to person, place, and time. He appears well-developed and well-nourished.   HENT:   Head: Normocephalic and atraumatic.   Eyes: Conjunctivae and EOM are normal. Pupils are equal, round, and reactive to light.   Neck: Neck supple. No JVD present. No thyromegaly present.   Cardiovascular: Normal rate, regular rhythm, normal heart sounds and intact distal pulses.  Exam reveals no gallop and no friction  rub.    No murmur heard.  Pulmonary/Chest: Effort normal and breath sounds normal. No respiratory distress. He has no wheezes. He has no rales. He exhibits no tenderness.   Abdominal: Soft. Bowel sounds are normal. He exhibits no distension. There is no tenderness. There is no rebound and no guarding.   Musculoskeletal: Normal range of motion. He exhibits no edema, tenderness or deformity.   Generalized weakness   Lymphadenopathy:     He has no cervical adenopathy.   Neurological: He is alert and oriented to person, place, and time. He displays normal reflexes. No cranial nerve deficit. He exhibits normal muscle tone.   Skin: Skin is warm and dry. No rash noted.   Psychiatric: He has a normal mood and affect. His behavior is normal. Thought content normal.   Nursing note and vitals reviewed.      Results Review:  Lab Results (last 24 hours)     Procedure Component Value Units Date/Time    Magnesium [[id number]]  (Abnormal) Collected:  06/13/17 1822    Specimen:  Blood Updated:  06/13/17 1849     Magnesium 2.4 (H) mg/dL     Lipase [[id number]]  (Normal) Collected:  06/13/17 1822    Specimen:  Blood Updated:  06/13/17 1849     Lipase 51 U/L     Amylase [[id number]]  (Normal) Collected:  06/13/17 1822    Specimen:  Blood Updated:  06/13/17 1849     Amylase 95 U/L     Ethanol [[id number]]  (Normal) Collected:  06/13/17 1822    Specimen:  Blood Updated:  06/13/17 1849     Ethanol % <0.010 %     Narrative:       Not for legal purposes. Chain of Custody not followed.     Protime-INR [[id number]]  (Normal) Collected:  06/13/17 1822    Specimen:  Blood Updated:  06/13/17 1849     Protime 14.5 Seconds      INR 1.09    aPTT [[id number]]  (Normal) Collected:  06/13/17 1822    Specimen:  Blood Updated:  06/13/17 1849     PTT 27.2 seconds     D-dimer, Quantitative [[id number]]  (Abnormal) Collected:  06/13/17 1822    Specimen:  Blood Updated:  06/13/17 1849     D-Dimer, Quantitative 2.22 (H) mg/L (FEU)     Narrative:       Reference  Range is 0-0.50 mg/L FEU. However, results <0.50 mg/L FEU tends to rule out DVT or PE. Results >0.50 mg/L FEU are not useful in predicting absence or presence of DVT or PE.    Comprehensive Metabolic Panel [[id number]]  (Abnormal) Collected:  06/13/17 1822    Specimen:  Blood Updated:  06/13/17 1856     Glucose 97 mg/dL      BUN 79 (H) mg/dL      Creatinine 3.41 (H) mg/dL      Sodium 141 mmol/L      Potassium 6.7 (C) mmol/L      Chloride 105 mmol/L      CO2 22.0 (L) mmol/L      Calcium 7.9 (L) mg/dL      Total Protein 7.1 g/dL      Albumin 3.80 g/dL      ALT (SGPT) 49 U/L      AST (SGOT) 61 (H) U/L      Alkaline Phosphatase 83 U/L      Total Bilirubin 0.5 mg/dL      eGFR Non African Amer 19 (L) mL/min/1.73      Globulin 3.3 gm/dL      A/G Ratio 1.2 g/dL      BUN/Creatinine Ratio 23.2     Anion Gap 14.0 (H) mmol/L     BNP [[id number]]  (Abnormal) Collected:  06/13/17 1822    Specimen:  Blood Updated:  06/13/17 1900     proBNP 1580.0 (H) pg/mL     Troponin [[id number]]  (Normal) Collected:  06/13/17 1822    Specimen:  Blood Updated:  06/13/17 1900     Troponin I <0.012 ng/mL     Blood Gas, Arterial [[id number]]  (Abnormal) Collected:  06/13/17 1913    Specimen:  Arterial Blood Updated:  06/13/17 1918     Site Arterial: left radial     Darvin's Test --      Documented in Rapid Comm        pH, Arterial 7.345 (L) pH units      pCO2, Arterial 38.5 mm Hg      pO2, Arterial 78.0 (L) mm Hg      HCO3, Arterial 20.5 (L) mmol/L      Base Excess, Arterial -4.6 (L) mmol/L      O2 Saturation, Arterial 95.0 %      O2 Saturation Calculated 95.0 %      Barometric Pressure for Blood Gas -- mmHg       Component not reported at this site.        Modality Cannula     Flow Rate 2.00 lpm     Narrative:       Serial Number: [id number]    : [id number]    Lactic Acid, Plasma [[id number]]  (Normal) Collected:  06/13/17 1912    Specimen:  Blood Updated:  06/13/17 1935     Lactate 0.6 mmol/L     Urinalysis With / Culture If Indicated [[id number]]   (Abnormal) Collected:  06/13/17 1931    Specimen:  Urine from Urine, Catheter Updated:  06/13/17 1947     Color, UA Yellow     Appearance, UA Clear     pH, UA <=5.0     Specific Gravity, UA 1.019     Glucose, UA Negative     Ketones, UA Negative     Bilirubin, UA Negative     Blood, UA Negative     Protein, UA Trace (A)     Leuk Esterase, UA Negative     Nitrite, UA Negative     Urobilinogen, UA 0.2 E.U./dL    Narrative:       Urine microscopic not indicated.    Light Blue Top [[id number]] Collected:  06/13/17 1822    Specimen:  Blood Updated:  06/13/17 2001     Extra Tube hold for add-on      Auto resulted       Green Top (Gel) [[id number]] Collected:  06/13/17 1822    Specimen:  Blood Updated:  06/13/17 2001     Extra Tube Hold for add-ons.      Auto resulted.       TSH [[id number]]  (Normal) Collected:  06/13/17 1912    Specimen:  Blood Updated:  06/13/17 2007     TSH 1.330 mIU/mL     CBC & Differential [[id number]] Collected:  06/13/17 1912    Specimen:  Blood Updated:  06/13/17 2016    Narrative:       The following orders were created for panel order CBC & Differential.  Procedure                               Abnormality         Status                     ---------                               -----------         ------                     Scan Slide[[id number]]                                       Final result               CBC Auto Differential[[id number]]        Abnormal            Final result                 Please view results for these tests on the individual orders.    CBC Auto Differential [[id number]]  (Abnormal) Collected:  06/13/17 1912    Specimen:  Blood Updated:  06/13/17 2016     WBC 12.26 (H) 10*3/mm3      RBC 3.57 (L) 10*6/mm3      Hemoglobin 7.2 (L) g/dL      Hematocrit 24.4 (L) %      MCV 68.3 (L) fL      MCH 20.2 (L) pg      MCHC 29.5 (L) g/dL      RDW 21.0 (H) %      RDW-SD 46.4 fl      MPV 9.5 fL      Platelets 277 10*3/mm3      Neutrophil % 78.7 (H) %      Lymphocyte % 12.3 (L) %       Monocyte % 6.8 %      Eosinophil % 1.0 %      Basophil % 0.2 %      Immature Grans % 1.0 %      Neutrophils, Absolute 9.66 (H) 10*3/mm3      Lymphocytes, Absolute 1.51 10*3/mm3      Monocytes, Absolute 0.83 10*3/mm3      Eosinophils, Absolute 0.12 10*3/mm3      Basophils, Absolute 0.02 10*3/mm3      Immature Grans, Absolute 0.12 (H) 10*3/mm3      nRBC 0.0 /100 WBC     Scan Slide [[id number]] Collected:  06/13/17 1912    Specimen:  Blood Updated:  06/13/17 2016     Hypochromia Slight/1+     Microcytes Slight/1+     Poikilocytes Slight/1+     WBC Morphology Normal     Platelet Estimate Adequate    Urine Drug Screen [[id number]]  (Abnormal) Collected:  06/13/17 1932    Specimen:  Urine from Urine, Clean Catch Updated:  06/13/17 2053     Amphetamine Screen, Urine Negative     Barbiturates Screen, Urine Negative     Benzodiazepine Screen, Urine Positive (A)     Cocaine Screen, Urine Negative     Methadone Screen, Urine Negative     Opiate Screen Positive (A)     Phencyclidine (PCP), Urine Negative     THC, Screen, Urine Negative    Narrative:       Negative Thresholds For Drugs Screened in Urine:    Amphetamines          500 ng/ml  Barbiturates          200 ng/ml  Benzodiazepines       200 ng/ml  Cocaine               150 ng/ml  Methadone             150 ng/ml  Opiates               300 ng/ml  Phencyclidine         25 ng/ml  THC                      50 ng/ml    The normal value for all drugs tested is negative. This report includes final unconfirmed screening results.  A positive result by this assay can be, at your request, sent to the Reference Lab for confirmation by gas chromatography. Unconfirmed results must not be used for non-medical purposes, such as employment or legal testing. Clinical consideration should be applied to any drug of abuse test result, particularly when unconfirmed results are used.    Lavender Top [[id number]] Collected:  06/13/17 1912    Specimen:  Blood Updated:  06/13/17 2101     Extra Tube  hold for add-on      Auto resulted       [facility] [[id number]] Collected:  06/13/17 1822    Specimen:  Blood Updated:  06/13/17 2146    Narrative:       The following orders were created for panel order [facility] Draw.  Procedure                               Abnormality         Status                     ---------                               -----------         ------                     Light Blue Top[[id number]]                                   Final result               Green Top (Gel)[[id number]]                                  Final result               Lavender Top[[id number]]                                     Final result               Red Top[[id number]]                                                                     Green Top (No Gel)[[id number]]                                                            Please view results for these tests on the individual orders.    Ammonia [[id number]]  (Abnormal) Collected:  06/13/17 2225    Specimen:  Blood Updated:  06/13/17 2243     Ammonia <9 (L) umol/L     Urine Culture [[id number]] Collected:  06/14/17 0019    Specimen:  Urine from Urine, Clean Catch Updated:  06/14/17 0033    Acetaminophen Level [[id number]]  (Abnormal) Collected:  06/13/17 2350    Specimen:  Blood Updated:  06/14/17 0049     Acetaminophen <10.0 (L) mcg/mL     Salicylate Level [[id number]]  (Abnormal) Collected:  06/13/17 2350    Specimen:  Blood Updated:  06/14/17 0049     Salicylate <1.0 (L) mg/dL     Basic Metabolic Panel [[id number]]  (Abnormal) Collected:  06/14/17 0221    Specimen:  Blood Updated:  06/14/17 0413     Glucose 88 mg/dL      BUN 69 (H) mg/dL      Creatinine 2.37 (H) mg/dL      Sodium 145 mmol/L      Potassium 5.6 (H) mmol/L      Chloride 107 mmol/L      CO2 25.0 mmol/L      Calcium 8.7 mg/dL      eGFR Non African Amer 29 (L) mL/min/1.73      BUN/Creatinine Ratio 29.1 (H)     Anion Gap 13.0 mmol/L     Narrative:       GFR Normal >60  Chronic Kidney Disease <60  Kidney Failure  <15  Specimen drawn while blood infusing    CBC (No Diff) [[id number]] Collected:  06/14/17 0221    Specimen:  Blood Updated:  06/14/17 0514     WBC -- 10*3/mm3       Patient receiving blood  Corrected result. Previous result was 10.76 10*3/mm3 on 6/14/2017 at [facility] CDT        RBC -- 10*6/mm3       Patient receiving blood  Corrected result. Previous result was 3.69 10*6/mm3 on 6/14/2017 at [facility] CDT        Hemoglobin -- g/dL       Patient receiving blood  Corrected result. Previous result was 7.2 g/dL on 6/14/2017 at [facility] CDT        Hematocrit -- %       Patient receiving blood  Corrected result. Previous result was 25.5 % on 6/14/2017 at [facility] CDT        MCV -- fL       Patient receiving blood  Corrected result. Previous result was 69.1 fL on 6/14/2017 at [address] [place], [place] [address]T        MCH -- pg       Patient receiving blood  Corrected result. Previous result was 19.5 pg on 6/14/2017 at [facility] CDT        MCHC -- g/dL       Patient receiving blood  Corrected result. Previous result was 28.2 g/dL on 6/14/2017 at [facility] CDT        RDW -- %       Corrected result. Previous result was 21.3 % on 6/14/2017 at [address] [place], [place] [address]T        RDW-SD -- fl       Corrected result. Previous result was 49.0 fl on 6/14/2017 at [facility] CDT        MPV -- fL       Corrected result. Previous result was 10.1 fL on 6/14/2017 at [facility] CDT        Platelets -- 10*3/mm3       Patient receiving blood  Corrected result. Previous result was 316 10*3/mm3 on 6/14/2017 at 0419 CDT       Narrative:       Collected during blood transfusion    CBC (No Diff) [[id number]]  (Abnormal) Collected:  06/14/17 0618    Specimen:  Blood Updated:  06/14/17 0639     WBC 9.48 10*3/mm3      RBC 3.78 (L) 10*6/mm3      Hemoglobin 7.8 (L) g/dL      Hematocrit 26.7 (L) %      MCV 70.6 (L) fL      MCH 20.6 (L) pg      MCHC 29.2 (L) g/dL      RDW 22.7 (H) %      RDW-SD 56.4 (H) fl      MPV 9.5 fL      Platelets 253 10*3/mm3            Cultures:       Radiology Data:    Imaging Results (last 24 hours)      Procedure Component Value Units Date/Time    XR Chest 1 View [[id number]] Collected:  06/13/17 1822     Updated:  06/13/17 2100    Narrative:       EXAMINATION:   XR CHEST 1 VW-  6/13/2017 6:12 PM CDT     HISTORY: Hypoxia.     A single view of the chest is obtained. Chronic change is present in the  pulmonary parenchyma, similar to 09/19/2015. The cardiac silhouette is  mildly enlarged.       Impression:       Chronic interstitial change in the pulmonary parenchyma is  present. Subtle superimposed interstitial edema cannot be excluded.  This report was finalized on 06/13/2017 20:57 by Dr. Mode Willams MD.    CT Head Without Contrast [[id number]] Collected:  06/13/17 2114     Updated:  06/13/17 2158    Narrative:       CT BRAIN WITHOUT CONTRAST 6/13/2017 8:55 PM CDT     HISTORY: Altered mental status.     COMPARISON: 09/16/2015      DLP: 811 mGy cm     TECHNIQUE: Serial axial tomographic images of the brain were obtained  without the use of intravenous contrast.      FINDINGS:   The midline structures are nondisplaced. There is mild cerebral and  cerebellar volume loss, with an associated increase in the prominence of  the ventricles and sulci. The basilar cisterns are normal in size and  configuration. There is no evidence of intracranial hemorrhage or  mass-effect.     . There are no abnormal extra-axial fluid collections. There is no  evidence of tonsillar herniation.      The included orbits and their contents are unremarkable. The visualized  paranasal sinuses, mastoid air cells and middle ear cavities are clear.  The visualized osseous structures and overlying soft tissues of the  skull and face are intact.        Impression:       No acute intracranial abnormality is identified. Mild cortical volume  loss is noted.        This report was finalized on 06/13/2017 21:55 by Dr. Mode Willams MD.    NM Lung Ventilation Perfusion [[id number]] Collected:  06/14/17 0733     Updated:  06/14/17 0737    Narrative:        EXAMINATION: NM LUNG VENTILATION PERFUSION- 6/14/2017 7:33 AM CDT     HISTORY: Elevated d-dimer.     Dose:  1. 11.3 mCi xenon-133 via inhalation.  2. 5.5 mCi technetium 99m MAA intravenously.     REPORT: Comparison is made with the chest x-ray of 06/13/2017, with no  focal infiltrates demonstrated. The ventilation images show normal  distribution of xenon within the lungs. The perfusion images show normal  homogeneous distribution of technetium within the lungs, there are no  filling defects to suggest significant pulmonary emboli.       Impression:       Low probability ventilation/perfusion lung scan for  significant pulmonary emboli.     A preliminary report was provided by the [facility] radiology service.     This report was finalized on 06/14/2017 07:34 by Dr. Neil Modi MD.    CT Cervical Spine Without Contrast [[id number]] Collected:  06/14/17 0738     Updated:  06/14/17 0744    Narrative:       EXAMINATION: CT CERVICAL SPINE WO CONTRAST- 6/14/2017 7:38 AM CDT     HISTORY: Neck pain, acute encephalopathy.     DOSE: 633 mGycm (Automatic exposure control technique was implemented in  an effort to keep the radiation dose as low as possible without  compromising image quality)     Report: Multiple axial images of the cervical spine were obtained  without contrast, the examination includes reformatted sagittal and  coronal images. There are no comparison studies.     Alignment is normal except for slight retrolisthesis of C5 relative to  C6, this is compatible with grade 1 spondylolisthesis. There is moderate  disc space narrowing, endplate spurring and sclerosis at C5-6. No  fracture is identified. The prevertebral soft tissues have normal  thickness. There is mild central canal stenosis at C5-6. There is  moderate facet degeneration on the left C2-3, C3-4 and C4-5. There is  mild bilateral neural foraminal narrowing at C5-6 related to  osteophytes.       Impression:       Impression: No acute cervical spine  abnormality. Chronic degenerative  disc disease at C5-6.     A preliminary report was provided by the [facility] radiology service.           This report was finalized on 06/14/2017 07:41 by Dr. Neil Modi MD.          No Known Allergies    Scheduled meds:     folic acid (FOLVITE) IVPB 1 mg Intravenous Daily   pantoprazole 40 mg Intravenous Q AM   thiamine (VITAMIN B1) IVPB 100 mg Intravenous Daily       PRN meds:  ondansetron    Assessment/Plan     Active Problems:    Acute renal failure    Altered mental status      Plan:  Altered mental status/drug overdose- Ct head, no acute change.  Tylenol and salicylate levels - low, ammonia level and lactic acid level- low. Patient denies any suicidal, homicidal thoughts.  Consult   [facility] to evaluate overdose.     Acute renal failure -  probably secondary to dehydration. Improving with IV fluid hydration.     Hyperkalemia-  status post calcium and dextrose insulin in the ER. Improve today.     Chronic pain-put patient back as Ultram and decrease the dose of Norco.  Continue Neurontin.     Anemia- S/P transfuse 1 unit of PRBCs. Stable.       Elevated D-dimer. V/Q, low probability.    Alcohol abuse-last drink was 2 days ago, start Librium    Diet regular    Discharge Planning:  Unknown    Mynor Kaye MD   06/14/17   8:20 AM                     Electronically signed by Mynor Kaye MD at 6/14/2017  8:54 AM      Mynor Kaye MD at 6/14/2017  8:19 AM  Version 1 of 2             [facility] Medicine Services  INPATIENT PROGRESS NOTE    Length of Stay: 1  Date of Admission: 6/13/2017  Primary Care Physician: No Known Provider    Subjective   Chief Complaint: Drug overdose, acute kidney failure, hyperkalemia    HPI   Patient's awake, alert.  Oriented ×3.  Denies any chest pain or shortness breath. Denies any suicidal, homicidal thoughts.  Last alcohol drink was 2 days ago.  Patient had a pint of vodka.  No sign of withdrawal at this  time.    Review of Systems   Constitutional: Positive for fatigue. Negative for activity change, appetite change, chills and fever.   HENT: Negative for hearing loss, nosebleeds, tinnitus and trouble swallowing.    Eyes: Negative for visual disturbance.   Respiratory: Negative for cough, chest tightness, shortness of breath and wheezing.    Cardiovascular: Negative for chest pain, palpitations and leg swelling.   Gastrointestinal: Negative for abdominal distention, abdominal pain, blood in stool, constipation, diarrhea, nausea and vomiting.   Endocrine: Negative for cold intolerance, heat intolerance, polydipsia, polyphagia and polyuria.   Genitourinary: Negative for decreased urine volume, difficulty urinating, dysuria, flank pain, frequency and hematuria.   Musculoskeletal: Negative for arthralgias, joint swelling and myalgias.   Skin: Negative for rash.   Allergic/Immunologic: Negative for immunocompromised state.   Neurological: Negative for dizziness, syncope, light-headedness and headaches.   Hematological: Negative for adenopathy. Does not bruise/bleed easily.   Psychiatric/Behavioral: Positive for agitation and confusion. Negative for sleep disturbance. The patient is not nervous/anxious.           All pertinent negatives and positives are as above. All other systems have been reviewed and are negative unless otherwise stated.     Objective    Temp:  [98.9 °F (37.2 °C)-100.1 °F (37.8 °C)] 98.9 °F (37.2 °C)  Heart Rate:  [] 86  Resp:  [18-22] 18  BP: ()/() 122/94    Intake/Output Summary (Last 24 hours) at 06/14/17 0820  Last data filed at 06/14/17 0430   Gross per 24 hour   Intake              619 ml   Output             1300 ml   Net             -681 ml     Physical Exam   Constitutional: He is oriented to person, place, and time. He appears well-developed and well-nourished.   HENT:   Head: Normocephalic and atraumatic.   Eyes: Conjunctivae and EOM are normal. Pupils are equal, round, and  reactive to light.   Neck: Neck supple. No JVD present. No thyromegaly present.   Cardiovascular: Normal rate, regular rhythm, normal heart sounds and intact distal pulses.  Exam reveals no gallop and no friction rub.    No murmur heard.  Pulmonary/Chest: Effort normal and breath sounds normal. No respiratory distress. He has no wheezes. He has no rales. He exhibits no tenderness.   Abdominal: Soft. Bowel sounds are normal. He exhibits no distension. There is no tenderness. There is no rebound and no guarding.   Musculoskeletal: Normal range of motion. He exhibits no edema, tenderness or deformity.   Generalized weakness   Lymphadenopathy:     He has no cervical adenopathy.   Neurological: He is alert and oriented to person, place, and time. He displays normal reflexes. No cranial nerve deficit. He exhibits normal muscle tone.   Skin: Skin is warm and dry. No rash noted.   Psychiatric: He has a normal mood and affect. His behavior is normal. Thought content normal.   Nursing note and vitals reviewed.      Results Review:  Lab Results (last 24 hours)     Procedure Component Value Units Date/Time    Magnesium [[id number]]  (Abnormal) Collected:  06/13/17 1822    Specimen:  Blood Updated:  06/13/17 1849     Magnesium 2.4 (H) mg/dL     Lipase [[id number]]  (Normal) Collected:  06/13/17 1822    Specimen:  Blood Updated:  06/13/17 1849     Lipase 51 U/L     Amylase [[id number]]  (Normal) Collected:  06/13/17 1822    Specimen:  Blood Updated:  06/13/17 1849     Amylase 95 U/L     Ethanol [[id number]]  (Normal) Collected:  06/13/17 1822    Specimen:  Blood Updated:  06/13/17 1849     Ethanol % <0.010 %     Narrative:       Not for legal purposes. Chain of Custody not followed.     Protime-INR [[id number]]  (Normal) Collected:  06/13/17 1822    Specimen:  Blood Updated:  06/13/17 1849     Protime 14.5 Seconds      INR 1.09    aPTT [[id number]]  (Normal) Collected:  06/13/17 1822    Specimen:  Blood Updated:  06/13/17 1849     PTT  27.2 seconds     D-dimer, Quantitative [[id number]]  (Abnormal) Collected:  06/13/17 1822    Specimen:  Blood Updated:  06/13/17 1849     D-Dimer, Quantitative 2.22 (H) mg/L (FEU)     Narrative:       Reference Range is 0-0.50 mg/L FEU. However, results <0.50 mg/L FEU tends to rule out DVT or PE. Results >0.50 mg/L FEU are not useful in predicting absence or presence of DVT or PE.    Comprehensive Metabolic Panel [[id number]]  (Abnormal) Collected:  06/13/17 1822    Specimen:  Blood Updated:  06/13/17 1856     Glucose 97 mg/dL      BUN 79 (H) mg/dL      Creatinine 3.41 (H) mg/dL      Sodium 141 mmol/L      Potassium 6.7 (C) mmol/L      Chloride 105 mmol/L      CO2 22.0 (L) mmol/L      Calcium 7.9 (L) mg/dL      Total Protein 7.1 g/dL      Albumin 3.80 g/dL      ALT (SGPT) 49 U/L      AST (SGOT) 61 (H) U/L      Alkaline Phosphatase 83 U/L      Total Bilirubin 0.5 mg/dL      eGFR Non African Amer 19 (L) mL/min/1.73      Globulin 3.3 gm/dL      A/G Ratio 1.2 g/dL      BUN/Creatinine Ratio 23.2     Anion Gap 14.0 (H) mmol/L     BNP [[id number]]  (Abnormal) Collected:  06/13/17 1822    Specimen:  Blood Updated:  06/13/17 1900     proBNP 1580.0 (H) pg/mL     Troponin [[id number]]  (Normal) Collected:  06/13/17 1822    Specimen:  Blood Updated:  06/13/17 1900     Troponin I <0.012 ng/mL     Blood Gas, Arterial [[id number]]  (Abnormal) Collected:  06/13/17 1913    Specimen:  Arterial Blood Updated:  06/13/17 1918     Site Arterial: left radial     Darvin's Test --      Documented in Rapid Comm        pH, Arterial 7.345 (L) pH units      pCO2, Arterial 38.5 mm Hg      pO2, Arterial 78.0 (L) mm Hg      HCO3, Arterial 20.5 (L) mmol/L      Base Excess, Arterial -4.6 (L) mmol/L      O2 Saturation, Arterial 95.0 %      O2 Saturation Calculated 95.0 %      Barometric Pressure for Blood Gas -- mmHg       Component not reported at this site.        Modality Cannula     Flow Rate 2.00 lpm     Narrative:       Serial Number: [id number]     : [id number]    Lactic Acid, Plasma [[id number]]  (Normal) Collected:  06/13/17 1912    Specimen:  Blood Updated:  06/13/17 1935     Lactate 0.6 mmol/L     Urinalysis With / Culture If Indicated [[id number]]  (Abnormal) Collected:  06/13/17 1931    Specimen:  Urine from Urine, Catheter Updated:  06/13/17 1947     Color, UA Yellow     Appearance, UA Clear     pH, UA <=5.0     Specific Gravity, UA 1.019     Glucose, UA Negative     Ketones, UA Negative     Bilirubin, UA Negative     Blood, UA Negative     Protein, UA Trace (A)     Leuk Esterase, UA Negative     Nitrite, UA Negative     Urobilinogen, UA 0.2 E.U./dL    Narrative:       Urine microscopic not indicated.    Light Blue Top [[id number]] Collected:  06/13/17 1822    Specimen:  Blood Updated:  06/13/17 2001     Extra Tube hold for add-on      Auto resulted       Green Top (Gel) [[id number]] Collected:  06/13/17 1822    Specimen:  Blood Updated:  06/13/17 2001     Extra Tube Hold for add-ons.      Auto resulted.       TSH [[id number]]  (Normal) Collected:  06/13/17 1912    Specimen:  Blood Updated:  06/13/17 2007     TSH 1.330 mIU/mL     CBC & Differential [[id number]] Collected:  06/13/17 1912    Specimen:  Blood Updated:  06/13/17 2016    Narrative:       The following orders were created for panel order CBC & Differential.  Procedure                               Abnormality         Status                     ---------                               -----------         ------                     Scan Slide[[id number]]                                       Final result               CBC Auto Differential[[id number]]        Abnormal            Final result                 Please view results for these tests on the individual orders.    CBC Auto Differential [[id number]]  (Abnormal) Collected:  06/13/17 1912    Specimen:  Blood Updated:  06/13/17 2016     WBC 12.26 (H) 10*3/mm3      RBC 3.57 (L) 10*6/mm3      Hemoglobin 7.2 (L) g/dL      Hematocrit 24.4 (L) %       MCV 68.3 (L) fL      MCH 20.2 (L) pg      MCHC 29.5 (L) g/dL      RDW 21.0 (H) %      RDW-SD 46.4 fl      MPV 9.5 fL      Platelets 277 10*3/mm3      Neutrophil % 78.7 (H) %      Lymphocyte % 12.3 (L) %      Monocyte % 6.8 %      Eosinophil % 1.0 %      Basophil % 0.2 %      Immature Grans % 1.0 %      Neutrophils, Absolute 9.66 (H) 10*3/mm3      Lymphocytes, Absolute 1.51 10*3/mm3      Monocytes, Absolute 0.83 10*3/mm3      Eosinophils, Absolute 0.12 10*3/mm3      Basophils, Absolute 0.02 10*3/mm3      Immature Grans, Absolute 0.12 (H) 10*3/mm3      nRBC 0.0 /100 WBC     Scan Slide [[id number]] Collected:  06/13/17 1912    Specimen:  Blood Updated:  06/13/17 2016     Hypochromia Slight/1+     Microcytes Slight/1+     Poikilocytes Slight/1+     WBC Morphology Normal     Platelet Estimate Adequate    Urine Drug Screen [[id number]]  (Abnormal) Collected:  06/13/17 1932    Specimen:  Urine from Urine, Clean Catch Updated:  06/13/17 2053     Amphetamine Screen, Urine Negative     Barbiturates Screen, Urine Negative     Benzodiazepine Screen, Urine Positive (A)     Cocaine Screen, Urine Negative     Methadone Screen, Urine Negative     Opiate Screen Positive (A)     Phencyclidine (PCP), Urine Negative     THC, Screen, Urine Negative    Narrative:       Negative Thresholds For Drugs Screened in Urine:    Amphetamines          500 ng/ml  Barbiturates          200 ng/ml  Benzodiazepines       200 ng/ml  Cocaine               150 ng/ml  Methadone             150 ng/ml  Opiates               300 ng/ml  Phencyclidine         25 ng/ml  THC                      50 ng/ml    The normal value for all drugs tested is negative. This report includes final unconfirmed screening results.  A positive result by this assay can be, at your request, sent to the Reference Lab for confirmation by gas chromatography. Unconfirmed results must not be used for non-medical purposes, such as employment or legal testing. Clinical consideration should  be applied to any drug of abuse test result, particularly when unconfirmed results are used.    Lavender Top [[id number]] Collected:  06/13/17 1912    Specimen:  Blood Updated:  06/13/17 2101     Extra Tube hold for add-on      Auto resulted       Mineral Draw [[id number]] Collected:  06/13/17 1822    Specimen:  Blood Updated:  06/13/17 2146    Narrative:       The following orders were created for panel order Mineral Draw.  Procedure                               Abnormality         Status                     ---------                               -----------         ------                     Light Blue Top[[id number]]                                   Final result               Green Top (Gel)[[id number]]                                  Final result               Lavender Top[[id number]]                                     Final result               Red Top[[id number]]                                                                     Green Top (No Gel)[[id number]]                                                            Please view results for these tests on the individual orders.    Ammonia [[id number]]  (Abnormal) Collected:  06/13/17 2225    Specimen:  Blood Updated:  06/13/17 2243     Ammonia <9 (L) umol/L     Urine Culture [[id number]] Collected:  06/14/17 0019    Specimen:  Urine from Urine, Clean Catch Updated:  06/14/17 0033    Acetaminophen Level [[id number]]  (Abnormal) Collected:  06/13/17 2350    Specimen:  Blood Updated:  06/14/17 0049     Acetaminophen <10.0 (L) mcg/mL     Salicylate Level [[id number]]  (Abnormal) Collected:  06/13/17 2350    Specimen:  Blood Updated:  06/14/17 0049     Salicylate <1.0 (L) mg/dL     Basic Metabolic Panel [[id number]]  (Abnormal) Collected:  06/14/17 0221    Specimen:  Blood Updated:  06/14/17 0413     Glucose 88 mg/dL      BUN 69 (H) mg/dL      Creatinine 2.37 (H) mg/dL      Sodium 145 mmol/L      Potassium 5.6 (H) mmol/L      Chloride 107 mmol/L      CO2 25.0 mmol/L       Calcium 8.7 mg/dL      eGFR Non African Amer 29 (L) mL/min/1.73      BUN/Creatinine Ratio 29.1 (H)     Anion Gap 13.0 mmol/L     Narrative:       GFR Normal >60  Chronic Kidney Disease <60  Kidney Failure <15  Specimen drawn while blood infusing    CBC (No Diff) [[id number]] Collected:  06/14/17 0221    Specimen:  Blood Updated:  06/14/17 0514     WBC -- 10*3/mm3       Patient receiving blood  Corrected result. Previous result was 10.76 10*3/mm3 on 6/14/2017 at [address] [place], [place] [address]T        RBC -- 10*6/mm3       Patient receiving blood  Corrected result. Previous result was 3.69 10*6/mm3 on 6/14/2017 at [address] [place], [place] [address]T        Hemoglobin -- g/dL       Patient receiving blood  Corrected result. Previous result was 7.2 g/dL on 6/14/2017 at [address] [place] [address]        Hematocrit -- %       Patient receiving blood  Corrected result. Previous result was 25.5 % on 6/14/2017 at [address] [place] [address]        MCV -- fL       Patient receiving blood  Corrected result. Previous result was 69.1 fL on 6/14/2017 at [address] [place] [address]        MCH -- pg       Patient receiving blood  Corrected result. Previous result was 19.5 pg on 6/14/2017 at [address] [place] [address]        MCHC -- g/dL       Patient receiving blood  Corrected result. Previous result was 28.2 g/dL on 6/14/2017 at [address] [place] [address]        RDW -- %       Corrected result. Previous result was 21.3 % on 6/14/2017 at [address] [place] [address]        RDW-SD -- fl       Corrected result. Previous result was 49.0 fl on 6/14/2017 at [address] [place] [address]        MPV -- fL       Corrected result. Previous result was 10.1 fL on 6/14/2017 at [address] [place] [address]        Platelets -- 10*3/mm3       Patient receiving blood  Corrected result. Previous result was 316 10*3/mm3 on 6/14/2017 at [facility] CDT       Narrative:       Collected during blood transfusion    CBC (No Diff) [[id number]]  (Abnormal) Collected:  06/14/17 0618    Specimen:  Blood Updated:  06/14/17 0639     WBC 9.48 10*3/mm3      RBC 3.78 (L) 10*6/mm3      Hemoglobin 7.8 (L) g/dL      Hematocrit 26.7 (L) %      MCV 70.6 (L) fL      MCH 20.6  (L) pg      MCHC 29.2 (L) g/dL      RDW 22.7 (H) %      RDW-SD 56.4 (H) fl      MPV 9.5 fL      Platelets 253 10*3/mm3            Cultures:       Radiology Data:    Imaging Results (last 24 hours)     Procedure Component Value Units Date/Time    XR Chest 1 View [[id number]] Collected:  06/13/17 1822     Updated:  06/13/17 2100    Narrative:       EXAMINATION:   XR CHEST 1 VW-  6/13/2017 6:12 PM CDT     HISTORY: Hypoxia.     A single view of the chest is obtained. Chronic change is present in the  pulmonary parenchyma, similar to 09/19/2015. The cardiac silhouette is  mildly enlarged.       Impression:       Chronic interstitial change in the pulmonary parenchyma is  present. Subtle superimposed interstitial edema cannot be excluded.  This report was finalized on 06/13/2017 20:57 by Dr. Mode Willams MD.    CT Head Without Contrast [[id number]] Collected:  06/13/17 2114     Updated:  06/13/17 2158    Narrative:       CT BRAIN WITHOUT CONTRAST 6/13/2017 8:55 PM CDT     HISTORY: Altered mental status.     COMPARISON: 09/16/2015      DLP: 811 mGy cm     TECHNIQUE: Serial axial tomographic images of the brain were obtained  without the use of intravenous contrast.      FINDINGS:   The midline structures are nondisplaced. There is mild cerebral and  cerebellar volume loss, with an associated increase in the prominence of  the ventricles and sulci. The basilar cisterns are normal in size and  configuration. There is no evidence of intracranial hemorrhage or  mass-effect.     . There are no abnormal extra-axial fluid collections. There is no  evidence of tonsillar herniation.      The included orbits and their contents are unremarkable. The visualized  paranasal sinuses, mastoid air cells and middle ear cavities are clear.  The visualized osseous structures and overlying soft tissues of the  skull and face are intact.        Impression:       No acute intracranial abnormality is identified. Mild cortical volume  loss is  noted.        This report was finalized on 06/13/2017 21:55 by Dr. Mode Willams MD.    NM Lung Ventilation Perfusion [[id number]] Collected:  06/14/17 0733     Updated:  06/14/17 0737    Narrative:       EXAMINATION: NM LUNG VENTILATION PERFUSION- 6/14/2017 7:33 AM CDT     HISTORY: Elevated d-dimer.     Dose:  1. 11.3 mCi xenon-133 via inhalation.  2. 5.5 mCi technetium 99m MAA intravenously.     REPORT: Comparison is made with the chest x-ray of 06/13/2017, with no  focal infiltrates demonstrated. The ventilation images show normal  distribution of xenon within the lungs. The perfusion images show normal  homogeneous distribution of technetium within the lungs, there are no  filling defects to suggest significant pulmonary emboli.       Impression:       Low probability ventilation/perfusion lung scan for  significant pulmonary emboli.     A preliminary report was provided by the [facility] radiology service.     This report was finalized on 06/14/2017 07:34 by Dr. Neil Modi MD.    CT Cervical Spine Without Contrast [[id number]] Collected:  06/14/17 0738     Updated:  06/14/17 0744    Narrative:       EXAMINATION: CT CERVICAL SPINE WO CONTRAST- 6/14/2017 7:38 AM CDT     HISTORY: Neck pain, acute encephalopathy.     DOSE: 633 mGycm (Automatic exposure control technique was implemented in  an effort to keep the radiation dose as low as possible without  compromising image quality)     Report: Multiple axial images of the cervical spine were obtained  without contrast, the examination includes reformatted sagittal and  coronal images. There are no comparison studies.     Alignment is normal except for slight retrolisthesis of C5 relative to  C6, this is compatible with grade 1 spondylolisthesis. There is moderate  disc space narrowing, endplate spurring and sclerosis at C5-6. No  fracture is identified. The prevertebral soft tissues have normal  thickness. There is mild central canal stenosis at C5-6. There  is  moderate facet degeneration on the left C2-3, C3-4 and C4-5. There is  mild bilateral neural foraminal narrowing at C5-6 related to  osteophytes.       Impression:       Impression: No acute cervical spine abnormality. Chronic degenerative  disc disease at C5-6.     A preliminary report was provided by the [facility] radiology service.           This report was finalized on 06/14/2017 07:41 by Dr. Neil Modi MD.          No Known Allergies    Scheduled meds:     folic acid (FOLVITE) IVPB 1 mg Intravenous Daily   pantoprazole 40 mg Intravenous Q AM   thiamine (VITAMIN B1) IVPB 100 mg Intravenous Daily       PRN meds:  ondansetron    Assessment/Plan     Active Problems:    Acute renal failure    Altered mental status      Plan:  Altered mental status/drug overdose- Ct head, no acute change.  Tylenol and salicylate levels - low, ammonia level and lactic acid level- low.      Acute renal failure -  probably secondary to dehydration. Improving with IV fluid hydration.     Hyperkalemia-  status post calcium and dextrose insulin in the ER. Improve today.     Chronic pain-put patient back as Ultram and decrease the dose of Norco.  Continue Neurontin.     Anemia- S/P transfuse 1 unit of PRBCs. Stable.       Elevated D-dimer. V/Q, low probability.    Alcohol abuse-last drink was 2 days ago, start Librium    Diet regular    Discharge Planning:  Unknown    Mynor Kaye MD   06/14/17   8:20 AM                     Electronically signed by Mynor Kaye MD at 6/14/2017  8:52 AM        Consult Notes (last 72 hours) (Notes from 6/12/2017  2:33 PM through 6/15/2017  2:33 PM)     No notes of this type exist for this encounter.           Nursing Assessments (last 72 hours)      Psych PCS (Body System)     None            Discharge Summary     No notes of this type exist for this encounter.        Discharge Order     None

## 2017-06-16 LAB
ALBUMIN SERPL-MCNC: 4.1 G/DL (ref 3.5–5)
ALBUMIN/GLOB SERPL: 1.1 G/DL (ref 1.1–2.5)
ALP SERPL-CCNC: 87 U/L (ref 24–120)
ALT SERPL W P-5'-P-CCNC: 39 U/L (ref 0–54)
ANION GAP SERPL CALCULATED.3IONS-SCNC: 15 MMOL/L (ref 4–13)
ANISOCYTOSIS BLD QL: NORMAL
AST SERPL-CCNC: 53 U/L (ref 7–45)
BACTERIA SPEC AEROBE CULT: NORMAL
BASOPHILS # BLD AUTO: 0.04 10*3/MM3 (ref 0–0.2)
BASOPHILS NFR BLD AUTO: 0.5 % (ref 0–2)
BILIRUB SERPL-MCNC: 0.4 MG/DL (ref 0.1–1)
BUN BLD-MCNC: 12 MG/DL (ref 5–21)
BUN/CREAT SERPL: 14.1 (ref 7–25)
CALCIUM SPEC-SCNC: 9.7 MG/DL (ref 8.4–10.4)
CHLORIDE SERPL-SCNC: 105 MMOL/L (ref 98–110)
CO2 SERPL-SCNC: 24 MMOL/L (ref 24–31)
CREAT BLD-MCNC: 0.85 MG/DL (ref 0.5–1.4)
DEPRECATED RDW RBC AUTO: 54.8 FL (ref 40–54)
EOSINOPHIL # BLD AUTO: 0.32 10*3/MM3 (ref 0–0.7)
EOSINOPHIL NFR BLD AUTO: 3.8 % (ref 0–4)
ERYTHROCYTE [DISTWIDTH] IN BLOOD BY AUTOMATED COUNT: 22.2 % (ref 12–15)
GFR SERPL CREATININE-BSD FRML MDRD: 94 ML/MIN/1.73
GLOBULIN UR ELPH-MCNC: 3.9 GM/DL
GLUCOSE BLD-MCNC: 90 MG/DL (ref 70–100)
HCT VFR BLD AUTO: 30.6 % (ref 40–52)
HGB BLD-MCNC: 9.1 G/DL (ref 14–18)
HYPOCHROMIA BLD QL: NORMAL
IMM GRANULOCYTES # BLD: 0.39 10*3/MM3 (ref 0–0.03)
IMM GRANULOCYTES NFR BLD: 4.6 % (ref 0–5)
LYMPHOCYTES # BLD AUTO: 2.67 10*3/MM3 (ref 0.72–4.86)
LYMPHOCYTES NFR BLD AUTO: 31.6 % (ref 15–45)
MCH RBC QN AUTO: 20.7 PG (ref 28–32)
MCHC RBC AUTO-ENTMCNC: 29.7 G/DL (ref 33–36)
MCV RBC AUTO: 69.7 FL (ref 82–95)
MONOCYTES # BLD AUTO: 0.75 10*3/MM3 (ref 0.19–1.3)
MONOCYTES NFR BLD AUTO: 8.9 % (ref 4–12)
NEUTROPHILS # BLD AUTO: 4.27 10*3/MM3 (ref 1.87–8.4)
NEUTROPHILS NFR BLD AUTO: 50.6 % (ref 39–78)
PLAT MORPH BLD: NORMAL
PLATELET # BLD AUTO: 236 10*3/MM3 (ref 130–400)
PMV BLD AUTO: 9.4 FL (ref 6–12)
POIKILOCYTOSIS BLD QL SMEAR: NORMAL
POTASSIUM BLD-SCNC: 4.4 MMOL/L (ref 3.5–5.3)
PROT SERPL-MCNC: 8 G/DL (ref 6.3–8.7)
RBC # BLD AUTO: 4.39 10*6/MM3 (ref 4.8–5.9)
SODIUM BLD-SCNC: 144 MMOL/L (ref 135–145)
WBC MORPH BLD: NORMAL
WBC NRBC COR # BLD: 8.44 10*3/MM3 (ref 4.8–10.8)

## 2017-06-16 PROCEDURE — 25010000002 THIAMINE PER 100 MG: Performed by: FAMILY MEDICINE

## 2017-06-16 PROCEDURE — 85025 COMPLETE CBC W/AUTO DIFF WBC: CPT | Performed by: FAMILY MEDICINE

## 2017-06-16 PROCEDURE — 85007 BL SMEAR W/DIFF WBC COUNT: CPT | Performed by: FAMILY MEDICINE

## 2017-06-16 PROCEDURE — 80053 COMPREHEN METABOLIC PANEL: CPT | Performed by: FAMILY MEDICINE

## 2017-06-16 RX ADMIN — GABAPENTIN 300 MG: 300 CAPSULE ORAL at 05:34

## 2017-06-16 RX ADMIN — CHLORDIAZEPOXIDE HYDROCHLORIDE 50 MG: 25 CAPSULE ORAL at 17:39

## 2017-06-16 RX ADMIN — GABAPENTIN 300 MG: 300 CAPSULE ORAL at 21:46

## 2017-06-16 RX ADMIN — HYDROCODONE BITARTRATE AND ACETAMINOPHEN 1 TABLET: 7.5; 325 TABLET ORAL at 05:35

## 2017-06-16 RX ADMIN — NICOTINE 1 PATCH: 21 PATCH, EXTENDED RELEASE TRANSDERMAL at 08:42

## 2017-06-16 RX ADMIN — CHLORDIAZEPOXIDE HYDROCHLORIDE 50 MG: 25 CAPSULE ORAL at 11:20

## 2017-06-16 RX ADMIN — CHLORDIAZEPOXIDE HYDROCHLORIDE 50 MG: 25 CAPSULE ORAL at 05:34

## 2017-06-16 RX ADMIN — TRAMADOL HYDROCHLORIDE 50 MG: 50 TABLET, COATED ORAL at 08:41

## 2017-06-16 RX ADMIN — FOLIC ACID 125 ML/HR: 5 INJECTION, SOLUTION INTRAMUSCULAR; INTRAVENOUS; SUBCUTANEOUS at 08:41

## 2017-06-16 RX ADMIN — HYDROCODONE BITARTRATE AND ACETAMINOPHEN 1 TABLET: 7.5; 325 TABLET ORAL at 17:39

## 2017-06-16 RX ADMIN — GABAPENTIN 300 MG: 300 CAPSULE ORAL at 13:37

## 2017-06-16 RX ADMIN — HYDROCODONE BITARTRATE AND ACETAMINOPHEN 1 TABLET: 7.5; 325 TABLET ORAL at 11:32

## 2017-06-16 RX ADMIN — PANTOPRAZOLE SODIUM 40 MG: 40 TABLET, DELAYED RELEASE ORAL at 05:34

## 2017-06-16 RX ADMIN — QUETIAPINE FUMARATE 300 MG: 200 TABLET, FILM COATED ORAL at 23:25

## 2017-06-16 RX ADMIN — HYDROCODONE BITARTRATE AND ACETAMINOPHEN 1 TABLET: 7.5; 325 TABLET ORAL at 23:30

## 2017-06-16 RX ADMIN — CHLORDIAZEPOXIDE HYDROCHLORIDE 50 MG: 25 CAPSULE ORAL at 23:30

## 2017-06-16 RX ADMIN — TRAMADOL HYDROCHLORIDE 50 MG: 50 TABLET, COATED ORAL at 17:39

## 2017-06-16 NOTE — PLAN OF CARE
Problem: Patient Care Overview (Adult)  Goal: Plan of Care Review  Outcome: Ongoing (interventions implemented as appropriate)    06/15/17 1800   Coping/Psychosocial Response Interventions   Plan Of Care Reviewed With patient   Patient Care Overview   Progress no change   Outcome Evaluation   Outcome Summary/Follow up Plan Patient admitted to room 344 from . Patient a suicide risk and has sitter at bedside. VSS. Safety maintained. Will continue to monitor.          Problem: Fall Risk (Adult)  Goal: Identify Related Risk Factors and Signs and Symptoms  Outcome: Outcome(s) achieved Date Met:  06/15/17  Goal: Absence of Falls  Outcome: Ongoing (interventions implemented as appropriate)    Problem: Renal Failure/Kidney Injury, Acute (Adult)  Goal: Signs and Symptoms of Listed Potential Problems Will be Absent or Manageable (Renal Failure/Kidney Injury, Acute)  Outcome: Ongoing (interventions implemented as appropriate)    Problem: Depression (Adult,Obstetrics,Pediatric)  Goal: Identify Related Risk Factors and Signs and Symptoms  Outcome: Outcome(s) achieved Date Met:  06/15/17  Goal: Establish/Maintain Self-Care Routine  Outcome: Ongoing (interventions implemented as appropriate)  Goal: Improved/Stable Mood  Outcome: Ongoing (interventions implemented as appropriate)    Problem: Overdose, Ingestion/Inhalants (Adult)  Goal: Signs and Symptoms of Listed Potential Problems Will be Absent or Manageable (Overdose, Ingestion/Inhalants)  Outcome: Ongoing (interventions implemented as appropriate)    Problem: Suicide Risk (Adult,Obstetrics,Pediatric)  Goal: Identify Related Risk Factors and Signs and Symptoms  Outcome: Outcome(s) achieved Date Met:  06/15/17  Goal: Strength-Based Wellness/Recovery  Outcome: Ongoing (interventions implemented as appropriate)  Goal: Physical Safety  Outcome: Ongoing (interventions implemented as appropriate)

## 2017-06-16 NOTE — PROGRESS NOTES
[facility] Medicine Services  INPATIENT PROGRESS NOTE    Length of Stay: 3  Date of Admission: 6/13/2017  Primary Care Physician: No Known Provider    Subjective   Chief Complaint: Suicidal though    HPI   Patient's had suicidal thoughts.  Consult for [facility] evaluate.  Denies of any chest pain or shortness of breath.    Review of Systems   Constitutional: Negative for activity change, appetite change, chills and fever.   HENT: Negative for hearing loss, nosebleeds, tinnitus and trouble swallowing.   Eyes: Negative for visual disturbance.   Respiratory: Negative for cough, chest tightness, shortness of breath and wheezing.   Cardiovascular: Negative for chest pain, palpitations and leg swelling.   Gastrointestinal: Negative for abdominal distention, abdominal pain, blood in stool, constipation, diarrhea, nausea and vomiting.   Endocrine: Negative for cold intolerance, heat intolerance, polydipsia, polyphagia and polyuria.   Genitourinary: Negative for decreased urine volume, difficulty urinating, dysuria, flank pain, frequency and hematuria.   Musculoskeletal: Positive for arthralgias, back pain, myalgias and neck pain. Negative for joint swelling.   Skin: Negative for rash.   Allergic/Immunologic: Negative for immunocompromised state.   Neurological: Negative for dizziness, syncope, weakness, light-headedness and headaches.   Hematological: Negative for adenopathy. Does not bruise/bleed easily.   Psychiatric/Behavioral: Negative for confusion and sleep disturbance. The patient is not nervous/anxious.          All pertinent negatives and positives are as above. All other systems have been reviewed and are negative unless otherwise stated.     Objective    Temp:  [97.6 °F (36.4 °C)-98.2 °F (36.8 °C)] 98 °F (36.7 °C)  Heart Rate:  [61-84] 80  Resp:  [16-18] 18  BP: (128-163)/(85-98) 146/86    Intake/Output Summary (Last 24 hours) at 06/16/17 5199  Last data filed at  06/16/17 0929   Gross per 24 hour   Intake             1800 ml   Output                0 ml   Net             1800 ml     Physical Exam  Constitutional: He is oriented to person, place, and time. He appears well-developed and well-nourished.   HENT:   Head: Normocephalic and atraumatic.   Eyes: Conjunctivae and EOM are normal. Pupils are equal, round, and reactive to light.   Neck: Neck supple. No JVD present. No thyromegaly present.   Cardiovascular: Normal rate, regular rhythm, normal heart sounds and intact distal pulses. Exam reveals no gallop and no friction rub.   No murmur heard.  Pulmonary/Chest: Effort normal and breath sounds normal. No respiratory distress. He has no wheezes. He has no rales. He exhibits no tenderness.   Abdominal: Soft. Bowel sounds are normal. He exhibits no distension. There is no tenderness. There is no rebound and no guarding.   Musculoskeletal: Normal range of motion. He exhibits no edema, tenderness or deformity.   Lymphadenopathy:   He has no cervical adenopathy.   Neurological: He is alert and oriented to person, place, and time. He displays normal reflexes. No cranial nerve deficit. He exhibits normal muscle tone.   Skin: Skin is warm and dry. No rash noted.   Psychiatric: He has a normal mood and affect. His behavior is normal. Judgment and thought content normal.   Nursing note and vitals reviewed.  Results Review:  Lab Results (last 24 hours)     Procedure Component Value Units Date/Time    Urine Culture [[id number]]  (Normal) Collected:  06/14/17 0019    Specimen:  Urine from Urine, Clean Catch Updated:  06/16/17 0600     Urine Culture No growth at 2 days    Comprehensive Metabolic Panel [[id number]]  (Abnormal) Collected:  06/16/17 0603    Specimen:  Blood Updated:  06/16/17 0635     Glucose 90 mg/dL      BUN 12 mg/dL      Creatinine 0.85 mg/dL      Sodium 144 mmol/L      Potassium 4.4 mmol/L      Chloride 105 mmol/L      CO2 24.0 mmol/L      Calcium 9.7 mg/dL      Total  Protein 8.0 g/dL      Albumin 4.10 g/dL      ALT (SGPT) 39 U/L      AST (SGOT) 53 (H) U/L      Alkaline Phosphatase 87 U/L      Total Bilirubin 0.4 mg/dL      eGFR Non African Amer 94 mL/min/1.73      Globulin 3.9 gm/dL      A/G Ratio 1.1 g/dL      BUN/Creatinine Ratio 14.1     Anion Gap 15.0 (H) mmol/L     CBC & Differential [[id number]] Collected:  06/16/17 0603    Specimen:  Blood Updated:  06/16/17 0640    Narrative:       The following orders were created for panel order CBC & Differential.  Procedure                               Abnormality         Status                     ---------                               -----------         ------                     Scan Slide[[id number]]                                       Final result               CBC Auto Differential[[id number]]        Abnormal            Final result                 Please view results for these tests on the individual orders.    CBC Auto Differential [[id number]]  (Abnormal) Collected:  06/16/17 0603    Specimen:  Blood Updated:  06/16/17 0640     WBC 8.44 10*3/mm3      RBC 4.39 (L) 10*6/mm3      Hemoglobin 9.1 (L) g/dL      Hematocrit 30.6 (L) %      MCV 69.7 (L) fL      MCH 20.7 (L) pg      MCHC 29.7 (L) g/dL      RDW 22.2 (H) %      RDW-SD 54.8 (H) fl      MPV 9.4 fL      Platelets 236 10*3/mm3      Neutrophil % 50.6 %      Lymphocyte % 31.6 %      Monocyte % 8.9 %      Eosinophil % 3.8 %      Basophil % 0.5 %      Immature Grans % 4.6 %      Neutrophils, Absolute 4.27 10*3/mm3      Lymphocytes, Absolute 2.67 10*3/mm3      Monocytes, Absolute 0.75 10*3/mm3      Eosinophils, Absolute 0.32 10*3/mm3      Basophils, Absolute 0.04 10*3/mm3      Immature Grans, Absolute 0.39 (H) 10*3/mm3     Scan Slide [[id number]] Collected:  06/16/17 0603    Specimen:  Blood Updated:  06/16/17 0640     Anisocytosis Slight/1+     Hypochromia Slight/1+     Poikilocytes Slight/1+     WBC Morphology Normal     Platelet Morphology Normal           Cultures:  Urine  Culture   Date Value Ref Range Status   06/14/2017 No growth at 2 days  Final       Radiology Data:    Imaging Results (last 24 hours)     ** No results found for the last 24 hours. **          No Known Allergies    Scheduled meds:     chlordiazePOXIDE 50 mg Oral Q6H   gabapentin 300 mg Oral Q8H   IV Fluids 1000 mL + additives 125 mL/hr Intravenous Daily   nicotine 1 patch Transdermal Daily   pantoprazole 40 mg Oral Q AM   QUEtiapine 300 mg Oral Nightly   traMADol 50 mg Oral BID       PRN meds:  HYDROcodone-acetaminophen  •  ondansetron    Assessment/Plan     Active Problems:    Acute renal failure    Altered mental status      Plan:  Altered mental status/drug overdose- Ct head, no acute change. Tylenol and salicylate levels - low, ammonia level and lactic acid level- low.  Patient threating suicidal. . Suicidal watch for now.      renal failure - probably secondary to dehydration. Improving with IV fluid hydration.       Hyperkalemia- status post calcium and dextrose insulin in the ER. Improve today.       Chronic pain-put patient back as Ultram and decrease the dose of Norco. Continue Neurontin.      Anemia- S/P transfuse 1 unit of PRBCs. Stable.   Elevated D-dimer. V/Q, low probability.      Alcohol abuse-last drink was 2 days ago, start Librium      Diet regular      Discharge Planning: Consult [facility] evaluation    Mynor Kaye MD   06/16/17   3:26 PM

## 2017-06-16 NOTE — PLAN OF CARE
Problem: Patient Care Overview (Adult)  Goal: Plan of Care Review  Outcome: Ongoing (interventions implemented as appropriate)    06/16/17 0310   Coping/Psychosocial Response Interventions   Plan Of Care Reviewed With patient   Patient Care Overview   Progress no change   Outcome Evaluation   Outcome Summary/Follow up Plan vss; suicide watch; sitter at bedside; alert and oriented x 4; c/o neck pain; prn pain med offered relief; safety maintained; pt rested well this shift       Goal: Adult Individualization and Mutuality  Outcome: Ongoing (interventions implemented as appropriate)  Goal: Discharge Needs Assessment  Outcome: Ongoing (interventions implemented as appropriate)    Problem: Fall Risk (Adult)  Goal: Absence of Falls  Outcome: Ongoing (interventions implemented as appropriate)    Problem: Renal Failure/Kidney Injury, Acute (Adult)  Goal: Signs and Symptoms of Listed Potential Problems Will be Absent or Manageable (Renal Failure/Kidney Injury, Acute)  Outcome: Ongoing (interventions implemented as appropriate)    Problem: Overdose, Ingestion/Inhalants (Adult)  Goal: Signs and Symptoms of Listed Potential Problems Will be Absent or Manageable (Overdose, Ingestion/Inhalants)  Outcome: Ongoing (interventions implemented as appropriate)    Problem: Suicide Risk (Adult,Obstetrics,Pediatric)  Goal: Strength-Based Wellness/Recovery  Outcome: Ongoing (interventions implemented as appropriate)  Goal: Physical Safety  Outcome: Ongoing (interventions implemented as appropriate)

## 2017-06-16 NOTE — PROGRESS NOTES
Continued Stay Note  [facility]     Patient Name: Magnus Grande  MRN: [id number]  Today's Date: 6/16/2017    Admit Date: 6/13/2017          Discharge Plan       06/16/17 0826    Case Management/Social Work Plan    Plan Undetermined    Additional Comments Received consult: transfer to [facility] for [facility]. Please note: [facility] has already cleared this patient. However, patient did request admission to [facility]. 3C SW, Ana Hill, made referral to [facility] yesterday afternoon 1600. Referral is currently being reviewed by psychiatrist at [facility]. SW will follow for [facility] decision.     Please advise: New consults are not needed on this patient.               Discharge Codes     None            QUETA Stark

## 2017-06-16 NOTE — PLAN OF CARE
Problem: Patient Care Overview (Adult)  Goal: Plan of Care Review  Outcome: Ongoing (interventions implemented as appropriate)    06/16/17 9106   Coping/Psychosocial Response Interventions   Plan Of Care Reviewed With patient   Patient Care Overview   Progress improving   Outcome Evaluation   Outcome Summary/Follow up Plan Sitter at bedside. Pt c/o pain, scheduled and PRN meds given. Pt sat up in chair x2 today.         Problem: Fall Risk (Adult)  Goal: Absence of Falls  Outcome: Ongoing (interventions implemented as appropriate)    Problem: Renal Failure/Kidney Injury, Acute (Adult)  Goal: Signs and Symptoms of Listed Potential Problems Will be Absent or Manageable (Renal Failure/Kidney Injury, Acute)  Outcome: Ongoing (interventions implemented as appropriate)    Problem: Depression (Adult,Obstetrics,Pediatric)  Goal: Establish/Maintain Self-Care Routine  Outcome: Ongoing (interventions implemented as appropriate)  Goal: Improved/Stable Mood  Outcome: Ongoing (interventions implemented as appropriate)    Problem: Overdose, Ingestion/Inhalants (Adult)  Goal: Signs and Symptoms of Listed Potential Problems Will be Absent or Manageable (Overdose, Ingestion/Inhalants)  Outcome: Ongoing (interventions implemented as appropriate)    Problem: Suicide Risk (Adult,Obstetrics,Pediatric)  Goal: Strength-Based Wellness/Recovery  Outcome: Ongoing (interventions implemented as appropriate)  Goal: Physical Safety  Outcome: Ongoing (interventions implemented as appropriate)

## 2017-06-16 NOTE — PROGRESS NOTES
Continued Stay Note  [facility]     Patient Name: Magnus Grande  MRN: [id number]  Today's Date: 6/16/2017    Admit Date: 6/13/2017          Discharge Plan       06/16/17 1456    Case Management/Social Work Plan    Plan [facility] Consult    Additional Comments Dr. Kaye has requested a new [facility] Consult. OMAR called [facility] and spoke to Rachel to provide consult information. OMAR faxed hospital consult form to [facility] at [phone]. [facility] states a [facility] will come to hospital to evaluate.               Discharge Codes     None            QUETA Stark

## 2017-06-16 NOTE — PROGRESS NOTES
Continued Stay Note  [facility]     Patient Name: Magnus Grande  MRN: [id number]  Today's Date: 6/16/2017    Admit Date: 6/13/2017          Discharge Plan       06/16/17 0829    Case Management/Social Work Plan    Plan SPOKE TO CAMELIA AT [facility] ([phone]) REGARDING REFERRAL. CAMELIA STATES THE PHYSICIAN HAS NOT REVIEWED REFERRAL YET BUT AT PRESENT TIME THEY DO NOT HAVE ANY BEDS. CAMELIA STATES SHE WILL CALL  AFTER REFERRAL IS REVIEWED AND BED STATUS.       06/16/17 0802    Case Management/Social Work Plan    Plan Undetermined    Additional Comments Received consult: transfer to [facility] for [facility]. Please note: [facility] has already cleared this patient. However, patient did request admission to [facility]. 3C SW, Ana Hill, made referral to [facility] yesterday afternoon 1600. Referral is currently being reviewed by psychiatrist at [facility]. OMAR will follow for [facility] decision.               Discharge Codes     None            IRIS Diallo

## 2017-06-17 LAB
ALBUMIN SERPL-MCNC: 4.3 G/DL (ref 3.5–5)
ALBUMIN/GLOB SERPL: 1.1 G/DL (ref 1.1–2.5)
ALP SERPL-CCNC: 84 U/L (ref 24–120)
ALT SERPL W P-5'-P-CCNC: 42 U/L (ref 0–54)
ANION GAP SERPL CALCULATED.3IONS-SCNC: 17 MMOL/L (ref 4–13)
AST SERPL-CCNC: 61 U/L (ref 7–45)
BASOPHILS # BLD MANUAL: 0.09 10*3/MM3 (ref 0–0.2)
BASOPHILS NFR BLD AUTO: 1 % (ref 0–2)
BILIRUB SERPL-MCNC: 0.5 MG/DL (ref 0.1–1)
BUN BLD-MCNC: 16 MG/DL (ref 5–21)
BUN/CREAT SERPL: 18.8 (ref 7–25)
CALCIUM SPEC-SCNC: 9 MG/DL (ref 8.4–10.4)
CHLORIDE SERPL-SCNC: 103 MMOL/L (ref 98–110)
CO2 SERPL-SCNC: 22 MMOL/L (ref 24–31)
CREAT BLD-MCNC: 0.85 MG/DL (ref 0.5–1.4)
DEPRECATED RDW RBC AUTO: 55.6 FL (ref 40–54)
EOSINOPHIL # BLD MANUAL: 0.45 10*3/MM3 (ref 0–0.7)
EOSINOPHIL NFR BLD MANUAL: 5 % (ref 0–4)
ERYTHROCYTE [DISTWIDTH] IN BLOOD BY AUTOMATED COUNT: 22.5 % (ref 12–15)
GFR SERPL CREATININE-BSD FRML MDRD: 94 ML/MIN/1.73
GLOBULIN UR ELPH-MCNC: 3.9 GM/DL
GLUCOSE BLD-MCNC: 113 MG/DL (ref 70–100)
HCT VFR BLD AUTO: 31.7 % (ref 40–52)
HGB BLD-MCNC: 9.4 G/DL (ref 14–18)
LYMPHOCYTES # BLD MANUAL: 1.87 10*3/MM3 (ref 0.72–4.86)
LYMPHOCYTES NFR BLD MANUAL: 21 % (ref 15–45)
LYMPHOCYTES NFR BLD MANUAL: 5 % (ref 4–12)
MCH RBC QN AUTO: 20.7 PG (ref 28–32)
MCHC RBC AUTO-ENTMCNC: 29.7 G/DL (ref 33–36)
MCV RBC AUTO: 69.8 FL (ref 82–95)
MICROCYTES BLD QL: ABNORMAL
MONOCYTES # BLD AUTO: 0.45 10*3/MM3 (ref 0.19–1.3)
NEUTROPHILS # BLD AUTO: 6.05 10*3/MM3 (ref 1.87–8.4)
NEUTROPHILS NFR BLD MANUAL: 68 % (ref 39–78)
OVALOCYTES BLD QL SMEAR: ABNORMAL
PLATELET # BLD AUTO: 261 10*3/MM3 (ref 130–400)
PMV BLD AUTO: 9.3 FL (ref 6–12)
POIKILOCYTOSIS BLD QL SMEAR: ABNORMAL
POTASSIUM BLD-SCNC: 4 MMOL/L (ref 3.5–5.3)
PROT SERPL-MCNC: 8.2 G/DL (ref 6.3–8.7)
RBC # BLD AUTO: 4.54 10*6/MM3 (ref 4.8–5.9)
SCAN SLIDE: NORMAL
SMALL PLATELETS BLD QL SMEAR: ADEQUATE
SODIUM BLD-SCNC: 142 MMOL/L (ref 135–145)
WBC MORPH BLD: NORMAL
WBC NRBC COR # BLD: 8.9 10*3/MM3 (ref 4.8–10.8)

## 2017-06-17 PROCEDURE — 85025 COMPLETE CBC W/AUTO DIFF WBC: CPT | Performed by: FAMILY MEDICINE

## 2017-06-17 PROCEDURE — 85007 BL SMEAR W/DIFF WBC COUNT: CPT | Performed by: FAMILY MEDICINE

## 2017-06-17 PROCEDURE — 25010000002 ONDANSETRON PER 1 MG: Performed by: INTERNAL MEDICINE

## 2017-06-17 PROCEDURE — 25010000002 THIAMINE PER 100 MG: Performed by: FAMILY MEDICINE

## 2017-06-17 PROCEDURE — 80053 COMPREHEN METABOLIC PANEL: CPT | Performed by: FAMILY MEDICINE

## 2017-06-17 RX ADMIN — ONDANSETRON 4 MG: 2 INJECTION INTRAMUSCULAR; INTRAVENOUS at 23:39

## 2017-06-17 RX ADMIN — QUETIAPINE FUMARATE 300 MG: 200 TABLET, FILM COATED ORAL at 23:38

## 2017-06-17 RX ADMIN — PANTOPRAZOLE SODIUM 40 MG: 40 TABLET, DELAYED RELEASE ORAL at 06:36

## 2017-06-17 RX ADMIN — CHLORDIAZEPOXIDE HYDROCHLORIDE 50 MG: 25 CAPSULE ORAL at 17:41

## 2017-06-17 RX ADMIN — ONDANSETRON 4 MG: 2 INJECTION INTRAMUSCULAR; INTRAVENOUS at 02:23

## 2017-06-17 RX ADMIN — GABAPENTIN 300 MG: 300 CAPSULE ORAL at 06:36

## 2017-06-17 RX ADMIN — NICOTINE 1 PATCH: 21 PATCH, EXTENDED RELEASE TRANSDERMAL at 09:05

## 2017-06-17 RX ADMIN — TRAMADOL HYDROCHLORIDE 50 MG: 50 TABLET, COATED ORAL at 09:04

## 2017-06-17 RX ADMIN — HYDROCODONE BITARTRATE AND ACETAMINOPHEN 1 TABLET: 7.5; 325 TABLET ORAL at 06:36

## 2017-06-17 RX ADMIN — TRAMADOL HYDROCHLORIDE 50 MG: 50 TABLET, COATED ORAL at 17:42

## 2017-06-17 RX ADMIN — GABAPENTIN 300 MG: 300 CAPSULE ORAL at 21:17

## 2017-06-17 RX ADMIN — CHLORDIAZEPOXIDE HYDROCHLORIDE 50 MG: 25 CAPSULE ORAL at 23:38

## 2017-06-17 RX ADMIN — CHLORDIAZEPOXIDE HYDROCHLORIDE 50 MG: 25 CAPSULE ORAL at 11:37

## 2017-06-17 RX ADMIN — CHLORDIAZEPOXIDE HYDROCHLORIDE 50 MG: 25 CAPSULE ORAL at 06:36

## 2017-06-17 RX ADMIN — FOLIC ACID 125 ML/HR: 5 INJECTION, SOLUTION INTRAMUSCULAR; INTRAVENOUS; SUBCUTANEOUS at 09:04

## 2017-06-17 RX ADMIN — HYDROCODONE BITARTRATE AND ACETAMINOPHEN 1 TABLET: 7.5; 325 TABLET ORAL at 13:06

## 2017-06-17 RX ADMIN — HYDROCODONE BITARTRATE AND ACETAMINOPHEN 1 TABLET: 7.5; 325 TABLET ORAL at 19:17

## 2017-06-17 RX ADMIN — GABAPENTIN 300 MG: 300 CAPSULE ORAL at 13:06

## 2017-06-17 NOTE — PLAN OF CARE
Problem: Patient Care Overview (Adult)  Goal: Plan of Care Review  Outcome: Ongoing (interventions implemented as appropriate)    06/17/17 0557   Coping/Psychosocial Response Interventions   Plan Of Care Reviewed With patient   Patient Care Overview   Progress no change   Outcome Evaluation   Outcome Summary/Follow up Plan sitter at bedside, c/o pain to neck and back, prn meds effective, had shower, c/o nausea, IV zofran given x 1         Problem: Fall Risk (Adult)  Goal: Absence of Falls  Outcome: Ongoing (interventions implemented as appropriate)    Problem: Depression (Adult,Obstetrics,Pediatric)  Goal: Establish/Maintain Self-Care Routine  Outcome: Ongoing (interventions implemented as appropriate)  Goal: Improved/Stable Mood  Outcome: Ongoing (interventions implemented as appropriate)    Problem: Overdose, Ingestion/Inhalants (Adult)  Goal: Signs and Symptoms of Listed Potential Problems Will be Absent or Manageable (Overdose, Ingestion/Inhalants)  Outcome: Ongoing (interventions implemented as appropriate)    Problem: Suicide Risk (Adult,Obstetrics,Pediatric)  Goal: Strength-Based Wellness/Recovery  Outcome: Ongoing (interventions implemented as appropriate)  Goal: Physical Safety  Outcome: Ongoing (interventions implemented as appropriate)

## 2017-06-17 NOTE — PROGRESS NOTES
Continued Stay Note  [facility]     Patient Name: Magnus Grande  MRN: [id number]  Today's Date: 6/17/2017    Admit Date: 6/13/2017          Discharge Plan       06/17/17 1031    Case Management/Social Work Plan    Plan OMAR recieved a call last night (on call at home) from Kristi Adam.  She stated [facility] had been out again to see pt and pt was in agreement to go to [facility].  SW spoke to Lorie at [facility] [phone].  She stated they did not have any beds open this weekend but should Monday.  SW faxed referral to her at [phone].  I spoke to Etienne at [facility] who stated that they saw pt last night at 5:00pm and determined pt would admit to [facility] as a voluntary hospitalization once a bed is available.  OMAR will continue to follow.  QUETA Bazan.    Patient/Family In Agreement With Plan yes              Discharge Codes     None            QUETA Venegas

## 2017-06-17 NOTE — PROGRESS NOTES
[facility] Medicine Services  INPATIENT PROGRESS NOTE    Length of Stay: 4  Date of Admission: 6/13/2017  Primary Care Physician: No Known Provider    Subjective   Chief Complaint: Suicidal thought    HPI   [facility] by [facility].  [facility] make arrange for patient be admitted at [facility] behavioral health for suicidal thoughts on Monday. Denies of any chest pain or sob.       Review of Systems   Constitutional: Negative for activity change, appetite change, chills and fever.   HENT: Negative for hearing loss, nosebleeds, tinnitus and trouble swallowing.   Eyes: Negative for visual disturbance.   Respiratory: Negative for cough, chest tightness, shortness of breath and wheezing.   Cardiovascular: Negative for chest pain, palpitations and leg swelling.   Gastrointestinal: Negative for abdominal distention, abdominal pain, blood in stool, constipation, diarrhea, nausea and vomiting.   Endocrine: Negative for cold intolerance, heat intolerance, polydipsia, polyphagia and polyuria.   Genitourinary: Negative for decreased urine volume, difficulty urinating, dysuria, flank pain, frequency and hematuria.   Musculoskeletal: Positive for arthralgias, back pain, myalgias and neck pain. Negative for joint swelling.   Skin: Negative for rash.   Allergic/Immunologic: Negative for immunocompromised state.   Neurological: Negative for dizziness, syncope, weakness, light-headedness and headaches.   Hematological: Negative for adenopathy. Does not bruise/bleed easily.   Psychiatric/Behavioral: Negative for confusion and sleep disturbance. The patient is not nervous/anxious.         All pertinent negatives and positives are as above. All other systems have been reviewed and are negative unless otherwise stated.     Objective    Temp:  [97.6 °F (36.4 °C)-98.2 °F (36.8 °C)] 97.8 °F (36.6 °C)  Heart Rate:  [82-91] 84  Resp:  [14-18] 14  BP: (128-162)/(85-97)  131/85    Intake/Output Summary (Last 24 hours) at 06/17/17 1438  Last data filed at 06/16/17 1943   Gross per 24 hour   Intake              240 ml   Output                0 ml   Net              240 ml     Physical Exam  Constitutional: He is oriented to person, place, and time. He appears well-developed and well-nourished.   HENT:   Head: Normocephalic and atraumatic.   Eyes: Conjunctivae and EOM are normal. Pupils are equal, round, and reactive to light.   Neck: Neck supple. No JVD present. No thyromegaly present.   Cardiovascular: Normal rate, regular rhythm, normal heart sounds and intact distal pulses. Exam reveals no gallop and no friction rub.   No murmur heard.  Pulmonary/Chest: Effort normal and breath sounds normal. No respiratory distress. He has no wheezes. He has no rales. He exhibits no tenderness.   Abdominal: Soft. Bowel sounds are normal. He exhibits no distension. There is no tenderness. There is no rebound and no guarding.   Musculoskeletal: Normal range of motion. He exhibits no edema, tenderness or deformity.   Lymphadenopathy:   He has no cervical adenopathy.   Neurological: He is alert and oriented to person, place, and time. He displays normal reflexes. No cranial nerve deficit. He exhibits normal muscle tone.   Skin: Skin is warm and dry. No rash noted.   Psychiatric: He has a normal mood and affect. His behavior is normal. Judgment and thought content normal.   Nursing note and vitals reviewed.  Results Review:  Lab Results (last 24 hours)     Procedure Component Value Units Date/Time    Comprehensive Metabolic Panel [[id number]]  (Abnormal) Collected:  06/17/17 0622    Specimen:  Blood Updated:  06/17/17 0643     Glucose 113 (H) mg/dL      BUN 16 mg/dL      Creatinine 0.85 mg/dL      Sodium 142 mmol/L      Potassium 4.0 mmol/L      Chloride 103 mmol/L      CO2 22.0 (L) mmol/L      Calcium 9.0 mg/dL      Total Protein 8.2 g/dL      Albumin 4.30 g/dL      ALT (SGPT) 42 U/L      AST (SGOT) 61  (H) U/L      Alkaline Phosphatase 84 U/L      Total Bilirubin 0.5 mg/dL      eGFR Non African Amer 94 mL/min/1.73      Globulin 3.9 gm/dL      A/G Ratio 1.1 g/dL      BUN/Creatinine Ratio 18.8     Anion Gap 17.0 (H) mmol/L     CBC & Differential [[id number]] Collected:  06/17/17 0622    Specimen:  Blood Updated:  06/17/17 0702    Narrative:       The following orders were created for panel order CBC & Differential.  Procedure                               Abnormality         Status                     ---------                               -----------         ------                     Manual Differential[[id number]]          Abnormal            Final result               Scan Slide[[id number]]                                       Final result               CBC Auto Differential[[id number]]        Abnormal            Final result                 Please view results for these tests on the individual orders.    CBC Auto Differential [[id number]]  (Abnormal) Collected:  06/17/17 0622    Specimen:  Blood Updated:  06/17/17 0702     WBC 8.90 10*3/mm3      RBC 4.54 (L) 10*6/mm3      Hemoglobin 9.4 (L) g/dL      Hematocrit 31.7 (L) %      MCV 69.8 (L) fL      MCH 20.7 (L) pg      MCHC 29.7 (L) g/dL      RDW 22.5 (H) %      RDW-SD 55.6 (H) fl      MPV 9.3 fL      Platelets 261 10*3/mm3     Scan Slide [[id number]] Collected:  06/17/17 0622    Specimen:  Blood Updated:  06/17/17 0702     Scan Slide --      See Manual Differential Results       Manual Differential [[id number]]  (Abnormal) Collected:  06/17/17 0622    Specimen:  Blood Updated:  06/17/17 0702     Neutrophil % 68.0 %      Lymphocyte % 21.0 %      Monocyte % 5.0 %      Eosinophil % 5.0 (H) %      Basophil % 1.0 %      Neutrophils Absolute 6.05 10*3/mm3      Lymphocytes Absolute 1.87 10*3/mm3      Monocytes Absolute 0.45 10*3/mm3      Eosinophils Absolute 0.45 10*3/mm3      Basophils Absolute 0.09 10*3/mm3      Microcytes Slight/1+     Ovalocytes Slight/1+      Poikilocytes Slight/1+     WBC Morphology Normal     Platelet Estimate Adequate           Cultures:  Urine Culture   Date Value Ref Range Status   06/14/2017 No growth at 2 days  Final       Radiology Data:    Imaging Results (last 24 hours)     ** No results found for the last 24 hours. **          No Known Allergies    Scheduled meds:     chlordiazePOXIDE 50 mg Oral Q6H   gabapentin 300 mg Oral Q8H   IV Fluids 1000 mL + additives 125 mL/hr Intravenous Daily   nicotine 1 patch Transdermal Daily   pantoprazole 40 mg Oral Q AM   QUEtiapine 300 mg Oral Nightly   traMADol 50 mg Oral BID       PRN meds:  HYDROcodone-acetaminophen  •  ondansetron    Assessment/Plan     Active Problems:    Acute renal failure    Altered mental status      Plan:  Altered mental status/drug overdose- Ct head, no acute change. Tylenol and salicylate levels - low, ammonia level and lactic acid level- low. Patient threating suicidal. . Suicidal watch for now.       renal failure - probably secondary to dehydration. Improving with IV fluid hydration.       Hyperkalemia- status post calcium and dextrose insulin in the ER. Improve today.       Chronic pain-put patient back as Ultram and decrease the dose of Norco. Continue Neurontin.      Anemia- S/P transfuse 1 unit of PRBCs. Stable.   Elevated D-dimer. V/Q, low probability.      Alcohol abuse-last drink was 2 days ago, start Librium      Diet regular      Discharge Planning: Plan to transfer patient to [facility] behavioral health for suicidal thoughts and Monday.      Mynor Kaye MD   06/17/17   2:38 PM

## 2017-06-17 NOTE — PLAN OF CARE
Problem: Patient Care Overview (Adult)  Goal: Plan of Care Review  Outcome: Ongoing (interventions implemented as appropriate)    06/17/17 1700   Coping/Psychosocial Response Interventions   Plan Of Care Reviewed With patient   Patient Care Overview   Progress no change   Outcome Evaluation   Outcome Summary/Follow up Plan Patient c/o mild to moderate pain, medicated with scheduled and PRN PO medication as ordered. Sitter remains at bedside. Patient to be discharged to [facility] when bed is available. No new changes. Will continue to monitor.          Problem: Fall Risk (Adult)  Goal: Absence of Falls  Outcome: Ongoing (interventions implemented as appropriate)    Problem: Renal Failure/Kidney Injury, Acute (Adult)  Goal: Signs and Symptoms of Listed Potential Problems Will be Absent or Manageable (Renal Failure/Kidney Injury, Acute)  Outcome: Ongoing (interventions implemented as appropriate)    Problem: Depression (Adult,Obstetrics,Pediatric)  Goal: Establish/Maintain Self-Care Routine  Outcome: Ongoing (interventions implemented as appropriate)  Goal: Improved/Stable Mood  Outcome: Ongoing (interventions implemented as appropriate)    Problem: Overdose, Ingestion/Inhalants (Adult)  Goal: Signs and Symptoms of Listed Potential Problems Will be Absent or Manageable (Overdose, Ingestion/Inhalants)  Outcome: Ongoing (interventions implemented as appropriate)    Problem: Suicide Risk (Adult,Obstetrics,Pediatric)  Goal: Strength-Based Wellness/Recovery  Outcome: Ongoing (interventions implemented as appropriate)  Goal: Physical Safety  Outcome: Ongoing (interventions implemented as appropriate)

## 2017-06-17 NOTE — DISCHARGE PLACEMENT REQUEST
"Referral to [facility]  CC:  [facility] [place]    From:  Casie Ac, [facility]  [phone]      HarjinderBecky (57 y.o. Male)     Date of Birth Social Security Number Address Home Phone MRN    1960  [address]  [facility] [phone] [id number]    Christian Marital Status          Unknown        Admission Date Admission Type Admitting Provider Attending Provider Department, Room/Bed    6/13/17 Emergency Mynor Kaye MD Truong, Khai C, MD [facility] 3A, 344/1    Discharge Date Discharge Disposition Discharge Destination                      Attending Provider: Mynor Kaye MD     Allergies:  No Known Allergies    Isolation:  None   Infection:  None   Code Status:  Not on file    Ht:  71\" (180.3 cm)   Wt:  227 lb 9.6 oz (103 kg)    Admission Cmt:  None   Principal Problem:  None                Active Insurance as of 6/13/2017     Primary Coverage     Payor Plan Insurance Group Employer/Plan Group    MEDICARE MEDICARE A & B      Payor Plan Address Payor Plan Phone Number Effective From Effective To    [address] [phone] 7/1/2010     [place] [address]       Subscriber Name Subscriber Birth Date Member ID       BECKY GRANDE 1960 [id number]           Secondary Coverage     Payor Plan Insurance Group Employer/Plan Group    [place] [facility] [place] [facility] [place]     Payor Plan Address Payor Plan Phone Number Effective From Effective To    PO  [phone] 6/2/2017     [place] [address]       Subscriber Name Subscriber Birth Date Member ID       BECKY GRANDE 1960 [id number]                 Emergency Contacts      (Rel.) Home Phone Work Phone Mobile Phone    Neil Grande (Father) [phone] -- --               History & Physical      H&P signed by Skye Polanco MD at 6/15/2017  8:17 AM              History was obtained from patient with whom I spoke, endorsement from the ER physician, ER staff, " ER notes, and old records.     The patient was seen at approximately 8:52 p.m. on 06/13/2017.     CHIEF COMPLAINT: Altered mental status.     HISTORY OF PRESENT ILLNESS: The patient is a 57-year-old gentleman with a history of pneumonia in the past, acute renal failure, alcohol and tobacco dependence. Please note that he is a confused historian with garbled speech, so getting a story from him is almost impossible.     What I did learn from the ER is that he came in because EMS found him unresponsive at a hotel (last known normal was not known) and the patient had reported that he took 20 tablets of either valium and/or Klonopin, but he was not trying to hurt himself. In the ER, he did get activated charcoal as per the recommendation that the ER physician had with Poison Control.     He has been relatively hemodynamically stable although he is dropping his blood pressure here to the 90s. He was given 1L of fluid but then he pulled his IV fluids out and urinated on the floor.     Workup did show a negative alcohol level. ABGs showed pH of 7.345, CO2 of 38.2, O2 of 78, and bicarbonate of 20.5. BNP was 1580. Potassium 6.7, creatinine 3.41. D-dimer is elevated at 2.22. Hemoglobin is 7.2. Please note that baseline creatinine was 0.72 about a year ago.     In terms of other symptoms, he said he has had a fever but denies any chest pain, shortness of breath, GI symptoms or blood in the stool. I attempted to ask him other questions but he becomes somnolent and does not really answer me. He is only oriented to himself and is otherwise confused and speaks with garbled speech.     PAST MEDICAL HISTORY:   1.  According to old records, he has a history of acute renal failure.  2.  Gastritis.  3.  Hypoxia with metabolic encephalopathy.   4.  Depression.   5.  Hypertension.   6.  Chronic pain syndrome.   7.  Tobacco dependence.   8.  Alcohol dependence.  9.  Anemia secondary to gastritis.  10.  History of septic shock where he was  on 3 pressors.  11.  Pneumonia.   12.  History of MSSA and fungal right lung pneumonia.   13.  History of acute hypoxic hypercapnic respiratory failure.   14.  ICU delirium.   15.  History of MSSA pneumonia and bacteremia.  16.  Sepsis.   17.  Peptic ulcer disease.  18.  Alcohol abuse.  19.  Coronary artery disease.  20.  Depression.     FAMILY HISTORY: Denies any heart problems or stroke in the family.     SOCIAL HISTORY: The patient denies any alcohol, tobacco or drug abuse, although apparently there was alcohol and tobacco abuse in the past at least.     ALLERGIES: No known drug allergies.     HOME MEDICATIONS:  1.  Xanax 1 mg t.i.d.   2.  Diclofenac 75 mg b.i.d.  3.  Neurontin 300 mg t.i.d.  4.  Norco 10/325 t.i.d.  5.  Lisinopril 40 mg daily.   6.  Prilosec 20 mg b.i.d.  7.  Restoril 15 mg at nighttime.   8.  Ultram 50 mg b.i.d.     REVIEW OF SYSTEMS: All review of systems reviewed and negative and/or unobtainable except as mentioned in the HPI.     PHYSICAL EXAMINATION:  VITAL SIGNS: Pulse 114, respiratory rate 22, blood pressure 127/93, saturation 83%.   GENERAL: The patient is somnolent/groggy, no distress.   HEENT: No scleral icterus. Normal pinnae and hearing. Dry oral mucosa. Charcoal on his tongue.   NECK: No JVD or retractions.   HEART: S1, S2, with 3/6 systolic murmur. No murmur, gallop or rub.   LUNGS: Clear to auscultation bilaterally. Normal respiratory effort.  ABDOMEN: Soft, nontender, nondistended. Positive bowel sounds. No mass, hepatosplenomegaly, rebound or hernia.   EXTREMITIES: No edema.   MUSCULOSKELETAL: Normal strength and tone in all 4 extremities.   DERMATOLOGIC: No rashes, diaphoresis or jaundice.   PSYCHIATRIC: The patient is oriented to self only. He thinks it is 03/18/2018 and he does not know where he is exactly, although he belatedly mentioned “hospital.”     DIAGNOSTIC DATA: ABGs as previously discussed. Troponin was negative. BNP was 1580. Sodium 141, potassium 6.7, chloride  105, bicarbonate 22. BUN and creatinine 79 and 3.41, calcium 7.9, AST and ALT are 61 and 49, respectively. TSH 1.330. Amylase 95, lipase 51. Magnesium 2.4. D-dimer 2.22. INR 1.09, PTT 27.9. White count 12.26, hemoglobin 7.2, platelets were 277,000, with 78.7 segs noted, and an absolute neutrophil count of 9.66. Urinalysis was showing only trace protein. Alcohol level was negative. Chest x-ray was read as chronic interstitial changes of pulmonary parenchyma with subtle and superimposed interstitial edema cannot be excluded. EKG shows, per my interpretation, sinus tachycardia at 105 beats per minute, no acute changes of concern, but some peak T waves in the lateral leads. This had not been seen on a previous EKG.     ASSESSMENT/PLAN:  1.  Altered mental status, probably secondary to OD, either advertent or inadvertent. We are not sure exactly what he did or did not do. His last known normal is not known. For now, we are going to expand his workup so he will be monitored very closely in the unit. We will do neurological checks on him and check NIH and TOR-BSST. Will monitor him on telemetry and provide supplemental oxygenation. He will get a head CT noncontrast en route to the unit and we will have a full toxicology panel with drug screen, Tylenol salicylate levels, ammonia level and lactic acid level. He will get IV fluid hydration.   2.  Acute renal failure, probably secondary to dehydration. IV fluid hydration. Follow up creatinine in the morning.   3.  Hyperkalemia. The patient is status post calcium and dextrose insulin in the ER. We reviewed potassium and will treat as needed.   4.  Anemia. We will transfuse 1 unit of PRBCs. He does not endorse any bleeding symptoms, although again he is extremely somnolent and therefore a very poor historian. We will make him n.p.o. and provide IV Protonix (given his history of gastritis) and transfuse him PRBCs, monitoring for signs or symptoms of bleeding.   5.  Elevated  D-dimer. CT scan of his chest with contrast is not an option because of his renal failure. Check VQ scan.          cc:          Skye Polanco M.D.  /[id number]  D:  06/13/2017 22:05:16(Eastern Time)  T:  06/13/2017 23:04:19(Eastern Time)  Voice ID:  [id number]/Document ID:  [id number]     Electronically signed by Skye Polanco MD at 6/15/2017  8:17 AM        Prior to Admission Medications     Prescriptions Last Dose Informant Patient Reported? Taking?    ALPRAZolam (XANAX) 1 MG tablet 6/13/2017 Self Yes Yes    Take 1 mg by mouth 3 (Three) Times a Day.    diclofenac (VOLTAREN) 75 MG EC tablet 6/13/2017 Self No Yes    Take 1 tablet by mouth 2 (Two) Times a Day As Needed (pain).    gabapentin (NEURONTIN) 300 MG capsule 6/13/2017 Self No Yes    Take 1 capsule by mouth 3 (Three) Times a Day.    HYDROcodone-acetaminophen (NORCO)  MG per tablet 6/13/2017 Self Yes Yes    Take 1 tablet by mouth 3 (Three) Times a Day As Needed for Moderate Pain (4-6).    lisinopril (PRINIVIL,ZESTRIL) 40 MG tablet 6/13/2017 Self No Yes    Take 1 tablet by mouth Daily.    omeprazole (priLOSEC) 20 MG capsule 6/13/2017 Self No Yes    Take 1 capsule by mouth 2 (Two) Times a Day.    Patient taking differently:  Take 20 mg by mouth Every 12 (Twelve) Hours.    temazepam (RESTORIL) 15 MG capsule 6/12/2017 Self Yes Yes    Take 15 mg by mouth At Night As Needed for Sleep.    traMADol (ULTRAM) 50 MG tablet 6/13/2017 Self Yes Yes    Take 50 mg by mouth 2 (Two) Times a Day.          Hospital Medications (active)       Dose Frequency Start End    chlordiazePOXIDE (LIBRIUM) capsule 50 mg 50 mg Every 6 Hours Scheduled 6/14/2017 6/24/2017    Sig - Route: Take 2 capsules by mouth Every 6 (Six) Hours. - Oral    gabapentin (NEURONTIN) capsule 300 mg 300 mg Every 8 Hours Scheduled 6/14/2017     Sig - Route: Take 1 capsule by mouth Every 8 (Eight) Hours. - Oral    HYDROcodone-acetaminophen (NORCO) 7.5-325 MG per tablet 1 tablet 1 tablet Every 6 Hours PRN 6/15/2017  6/25/2017    Sig - Route: Take 1 tablet by mouth Every 6 (Six) Hours As Needed for Moderate Pain (4-6). - Oral    multiple vitamin (M.V.I. Adult) 10 mL, thiamine (B-1) 100 mg, folic acid 1 mg in sodium chloride 0.9 % 1,000 mL infusion 125 mL/hr Daily 6/14/2017     Sig - Route: Infuse 125 mL/hr into a venous catheter Daily. - Intravenous    nicotine (NICODERM CQ) 21 MG/24HR patch 1 patch 1 patch Daily 6/14/2017     Sig - Route: Place 1 patch on the skin Daily. - Transdermal    ondansetron (ZOFRAN) injection 4 mg 4 mg Every 6 Hours PRN 6/13/2017     Sig - Route: Infuse 2 mL into a venous catheter Every 6 (Six) Hours As Needed for Nausea or Vomiting. - Intravenous    pantoprazole (PROTONIX) EC tablet 40 mg 40 mg Every Early Morning 6/15/2017     Sig - Route: Take 1 tablet by mouth Every Morning. - Oral    QUEtiapine (SEROquel) tablet 300 mg 300 mg Nightly 6/15/2017     Sig - Route: Take 300 mg by mouth Every Night. - Oral    sodium chloride 0.9 % infusion 125 mL/hr Continuous 6/13/2017     Sig - Route: Infuse 125 mL/hr into a venous catheter Continuous. - Intravenous    traMADol (ULTRAM) tablet 50 mg 50 mg 2 Times Daily 6/14/2017     Sig - Route: Take 1 tablet by mouth 2 (Two) Times a Day. - Oral             Physician Progress Notes (most recent note)      Mynor Kaye MD at 6/16/2017  3:24 PM  Version 1 of 1             [facility]  INPATIENT PROGRESS NOTE    Length of Stay: 3  Date of Admission: 6/13/2017  Primary Care Physician: No Known Provider    Subjective   Chief Complaint: Suicidal though    HPI   Patient's had suicidal thoughts.  Consult for [place] mental health evaluate.  Denies of any chest pain or shortness of breath.    Review of Systems   Constitutional: Negative for activity change, appetite change, chills and fever.   HENT: Negative for hearing loss, nosebleeds, tinnitus and trouble swallowing.   Eyes: Negative for visual disturbance.   Respiratory:  Negative for cough, chest tightness, shortness of breath and wheezing.   Cardiovascular: Negative for chest pain, palpitations and leg swelling.   Gastrointestinal: Negative for abdominal distention, abdominal pain, blood in stool, constipation, diarrhea, nausea and vomiting.   Endocrine: Negative for cold intolerance, heat intolerance, polydipsia, polyphagia and polyuria.   Genitourinary: Negative for decreased urine volume, difficulty urinating, dysuria, flank pain, frequency and hematuria.   Musculoskeletal: Positive for arthralgias, back pain, myalgias and neck pain. Negative for joint swelling.   Skin: Negative for rash.   Allergic/Immunologic: Negative for immunocompromised state.   Neurological: Negative for dizziness, syncope, weakness, light-headedness and headaches.   Hematological: Negative for adenopathy. Does not bruise/bleed easily.   Psychiatric/Behavioral: Negative for confusion and sleep disturbance. The patient is not nervous/anxious.          All pertinent negatives and positives are as above. All other systems have been reviewed and are negative unless otherwise stated.     Objective    Temp:  [97.6 °F (36.4 °C)-98.2 °F (36.8 °C)] 98 °F (36.7 °C)  Heart Rate:  [61-84] 80  Resp:  [16-18] 18  BP: (128-163)/(85-98) 146/86    Intake/Output Summary (Last 24 hours) at 06/16/17 1526  Last data filed at 06/16/17 0929   Gross per 24 hour   Intake             1800 ml   Output                0 ml   Net             1800 ml     Physical Exam  Constitutional: He is oriented to person, place, and time. He appears well-developed and well-nourished.   HENT:   Head: Normocephalic and atraumatic.   Eyes: Conjunctivae and EOM are normal. Pupils are equal, round, and reactive to light.   Neck: Neck supple. No JVD present. No thyromegaly present.   Cardiovascular: Normal rate, regular rhythm, normal heart sounds and intact distal pulses. Exam reveals no gallop and no friction rub.   No murmur heard.  Pulmonary/Chest:  Effort normal and breath sounds normal. No respiratory distress. He has no wheezes. He has no rales. He exhibits no tenderness.   Abdominal: Soft. Bowel sounds are normal. He exhibits no distension. There is no tenderness. There is no rebound and no guarding.   Musculoskeletal: Normal range of motion. He exhibits no edema, tenderness or deformity.   Lymphadenopathy:   He has no cervical adenopathy.   Neurological: He is alert and oriented to person, place, and time. He displays normal reflexes. No cranial nerve deficit. He exhibits normal muscle tone.   Skin: Skin is warm and dry. No rash noted.   Psychiatric: He has a normal mood and affect. His behavior is normal. Judgment and thought content normal.   Nursing note and vitals reviewed.  Results Review:  Lab Results (last 24 hours)     Procedure Component Value Units Date/Time    Urine Culture [[id number]]  (Normal) Collected:  06/14/17 0019    Specimen:  Urine from Urine, Clean Catch Updated:  06/16/17 0600     Urine Culture No growth at 2 days    Comprehensive Metabolic Panel [[id number]]  (Abnormal) Collected:  06/16/17 0603    Specimen:  Blood Updated:  06/16/17 0635     Glucose 90 mg/dL      BUN 12 mg/dL      Creatinine 0.85 mg/dL      Sodium 144 mmol/L      Potassium 4.4 mmol/L      Chloride 105 mmol/L      CO2 24.0 mmol/L      Calcium 9.7 mg/dL      Total Protein 8.0 g/dL      Albumin 4.10 g/dL      ALT (SGPT) 39 U/L      AST (SGOT) 53 (H) U/L      Alkaline Phosphatase 87 U/L      Total Bilirubin 0.4 mg/dL      eGFR Non African Amer 94 mL/min/1.73      Globulin 3.9 gm/dL      A/G Ratio 1.1 g/dL      BUN/Creatinine Ratio 14.1     Anion Gap 15.0 (H) mmol/L     CBC & Differential [[id number]] Collected:  06/16/17 0603    Specimen:  Blood Updated:  06/16/17 0640    Narrative:       The following orders were created for panel order CBC & Differential.  Procedure                               Abnormality         Status                     ---------                                -----------         ------                     Scan Slide[[id number]]                                       Final result               CBC Auto Differential[[id number]]        Abnormal            Final result                 Please view results for these tests on the individual orders.    CBC Auto Differential [[id number]]  (Abnormal) Collected:  06/16/17 0603    Specimen:  Blood Updated:  06/16/17 0640     WBC 8.44 10*3/mm3      RBC 4.39 (L) 10*6/mm3      Hemoglobin 9.1 (L) g/dL      Hematocrit 30.6 (L) %      MCV 69.7 (L) fL      MCH 20.7 (L) pg      MCHC 29.7 (L) g/dL      RDW 22.2 (H) %      RDW-SD 54.8 (H) fl      MPV 9.4 fL      Platelets 236 10*3/mm3      Neutrophil % 50.6 %      Lymphocyte % 31.6 %      Monocyte % 8.9 %      Eosinophil % 3.8 %      Basophil % 0.5 %      Immature Grans % 4.6 %      Neutrophils, Absolute 4.27 10*3/mm3      Lymphocytes, Absolute 2.67 10*3/mm3      Monocytes, Absolute 0.75 10*3/mm3      Eosinophils, Absolute 0.32 10*3/mm3      Basophils, Absolute 0.04 10*3/mm3      Immature Grans, Absolute 0.39 (H) 10*3/mm3     Scan Slide [[id number]] Collected:  06/16/17 0603    Specimen:  Blood Updated:  06/16/17 0640     Anisocytosis Slight/1+     Hypochromia Slight/1+     Poikilocytes Slight/1+     WBC Morphology Normal     Platelet Morphology Normal           Cultures:  Urine Culture   Date Value Ref Range Status   06/14/2017 No growth at 2 days  Final       Radiology Data:    Imaging Results (last 24 hours)     ** No results found for the last 24 hours. **          No Known Allergies    Scheduled meds:     chlordiazePOXIDE 50 mg Oral Q6H   gabapentin 300 mg Oral Q8H   IV Fluids 1000 mL + additives 125 mL/hr Intravenous Daily   nicotine 1 patch Transdermal Daily   pantoprazole 40 mg Oral Q AM   QUEtiapine 300 mg Oral Nightly   traMADol 50 mg Oral BID       PRN meds:  HYDROcodone-acetaminophen  •  ondansetron    Assessment/Plan     Active Problems:    Acute renal failure    Altered  mental status      Plan:  Altered mental status/drug overdose- Ct head, no acute change. Tylenol and salicylate levels - low, ammonia level and lactic acid level- low.  Patient threating suicidal. . Suicidal watch for now.      renal failure - probably secondary to dehydration. Improving with IV fluid hydration.       Hyperkalemia- status post calcium and dextrose insulin in the ER. Improve today.       Chronic pain-put patient back as Ultram and decrease the dose of Norco. Continue Neurontin.      Anemia- S/P transfuse 1 unit of PRBCs. Stable.   Elevated D-dimer. V/Q, low probability.      Alcohol abuse-last drink was 2 days ago, start Librium      Diet regular      Discharge Planning: Consult [facility] evaluation    Mynor Kaye MD   06/16/17   3:26 PM                     Electronically signed by Mynor Kaye MD at 6/16/2017  3:31 PM        Consult Notes (most recent note)     No notes of this type exist for this encounter.              Comprehensive Metabolic Panel   Order: [id number]   Status:  Final result   Visible to patient:  No (Not Released)      Ref Range & Units 6:22 AM     Glucose 70 - 100 mg/dL 113 (H)   BUN 5 - 21 mg/dL 16   Creatinine 0.50 - 1.40 mg/dL 0.85   Sodium 135 - 145 mmol/L 142   Potassium 3.5 - 5.3 mmol/L 4.0   Chloride 98 - 110 mmol/L 103   CO2 24.0 - 31.0 mmol/L 22.0 (L)   Calcium 8.4 - 10.4 mg/dL 9.0   Total Protein 6.3 - 8.7 g/dL 8.2   Albumin 3.50 - 5.00 g/dL 4.30   ALT (SGPT) 0 - 54 U/L 42   AST (SGOT) 7 - 45 U/L 61 (H)   Alkaline Phosphatase 24 - 120 U/L 84   Total Bilirubin 0.1 - 1.0 mg/dL 0.5   eGFR Non African Amer >60 mL/min/1.73 94   Globulin gm/dL 3.9   A/G Ratio 1.1 - 2.5 g/dL 1.1   BUN/Creatinine Ratio 7.0 - 25.0 18.8   Anion Gap 4.0 - 13.0 mmol/L 17.0 (H)   Resulting Agency  [facility]      Specimen Collected: 06/17/17  6:22 AM Last Resulted: 06/17/17  6:43 AM                CBC Auto Differential   Order: [id number] - Part of Panel Order [id number]   Status:  Final  result   Visible to patient:  No (Not Released)      Ref Range & Units 6:22 AM     WBC 4.80 - 10.80 10*3/mm3 8.90   RBC 4.80 - 5.90 10*6/mm3 4.54 (L)   Hemoglobin 14.0 - 18.0 g/dL 9.4 (L)   Hematocrit 40.0 - 52.0 % 31.7 (L)   MCV 82.0 - 95.0 fL 69.8 (L)   MCH 28.0 - 32.0 pg 20.7 (L)   MCHC 33.0 - 36.0 g/dL 29.7 (L)   RDW 12.0 - 15.0 % 22.5 (H)   RDW-SD 40.0 - 54.0 fl 55.6 (H)   MPV 6.0 - 12.0 fL 9.3   Platelets 130 - 400 10*3/mm3 261   Resulting Agency  [facility]      Specimen Collected: 06/17/17  6:22 AM Last Resulted: 06/17/17  7:02 AM                         Acetaminophen Level   Order: [id number]   Status:  Final result   Visible to patient:  No (Not Released)      Ref Range & Units 4d ago     Acetaminophen 10.0 - 30.0 mcg/mL <10.0 (L)   Resulting Agency   [facility]      Specimen Collected: 06/13/17 11:50 PM Last Resulted: 06/14/17 12:49 AM              Urine Drug Screen [[id number]] (Order [id number])   Lab   Date: 6/13/2017 Department: [facility] 3A Released By/Authorizing: Carlos Sheehan MD (auto-released)          Urine Drug Screen   Order: [id number]   Status:  Final result   Visible to patient:  No (Not Released)      Ref Range & Units 4d ago     Amphetamine Screen, Urine Negative Negative   Barbiturates Screen, Urine Negative Negative   Benzodiazepine Screen, Urine Negative Positive (A)   Cocaine Screen, Urine Negative Negative   Methadone Screen, Urine Negative Negative   Opiate Screen Negative Positive (A)   Phencyclidine (PCP), Urine Negative Negative   THC, Screen, Urine Negative Negative   Resulting Agency   [facility]   Narrative   Negative Thresholds For Drugs Screened in Urine:    Amphetamines          500 ng/ml  Barbiturates          200 ng/ml  Benzodiazepines       200 ng/ml  Cocaine               150 ng/ml  Methadone             150 ng/ml  Opiates               300 ng/ml  Phencyclidine         25 ng/ml  THC                   50 ng/ml    The normal value for all drugs tested is  negative. This report includes final unconfirmed screening results.  A positive result by this assay can be, at your request, sent to the Reference Lab for confirmation by gas chromatography. Unconfirmed results must not be used for non-medical purposes, such as employment or legal testing. Clinical consideration should be applied to any drug of abuse test result, particularly when unconfirmed results are used.      Specimen Collected: 06/13/17  7:32 PM Last Resulted: 06/13/17  8:53 PM

## 2017-06-17 NOTE — PROGRESS NOTES
Continued Stay Note  [facility]     Patient Name: Magnus Grande  MRN: [id number]  Today's Date: 6/17/2017    Admit Date: 6/13/2017          Discharge Plan       06/17/17 1045    Case Management/Social Work Plan    Plan OMAR has also faxed [facility] evaluation to [facility].  [facility] will fax second evaluation once we recieve a copy.  QUETA Bazan.    Patient/Family In Agreement With Plan yes      06/17/17 1031    Case Management/Social Work Plan    Plan SW recieved a call last night (on call at home) from Kristi Adam.  She stated [facility] had been out again to see pt and pt was in agreement to go to [facility].  [facility] spoke to Lorie at [facility] [phone].  She stated they did not have any beds open this weekend but should Monday.  SW faxed referral to her at [phone].  I spoke to Etienne at [facility] who stated that they saw pt last night at 5:00pm and determined pt would admit to [facility] as a voluntary hospitalization once a bed is available.  OMAR will continue to follow.  QUETA Bazan.    Patient/Family In Agreement With Plan yes              Discharge Codes     None            QUETA Venegas

## 2017-06-18 LAB
ANION GAP SERPL CALCULATED.3IONS-SCNC: 14 MMOL/L (ref 4–13)
ANISOCYTOSIS BLD QL: NORMAL
BASOPHILS # BLD AUTO: 0.03 10*3/MM3 (ref 0–0.2)
BASOPHILS NFR BLD AUTO: 0.3 % (ref 0–2)
BUN BLD-MCNC: 15 MG/DL (ref 5–21)
BUN/CREAT SERPL: 16.7 (ref 7–25)
CALCIUM SPEC-SCNC: 9.5 MG/DL (ref 8.4–10.4)
CHLORIDE SERPL-SCNC: 105 MMOL/L (ref 98–110)
CO2 SERPL-SCNC: 25 MMOL/L (ref 24–31)
CREAT BLD-MCNC: 0.9 MG/DL (ref 0.5–1.4)
DEPRECATED RDW RBC AUTO: 56.8 FL (ref 40–54)
EOSINOPHIL # BLD AUTO: 0.35 10*3/MM3 (ref 0–0.7)
EOSINOPHIL NFR BLD AUTO: 3.4 % (ref 0–4)
ERYTHROCYTE [DISTWIDTH] IN BLOOD BY AUTOMATED COUNT: 22.7 % (ref 12–15)
GFR SERPL CREATININE-BSD FRML MDRD: 88 ML/MIN/1.73
GLUCOSE BLD-MCNC: 91 MG/DL (ref 70–100)
HCT VFR BLD AUTO: 33 % (ref 40–52)
HGB BLD-MCNC: 9.8 G/DL (ref 14–18)
IMM GRANULOCYTES # BLD: 0.26 10*3/MM3 (ref 0–0.03)
IMM GRANULOCYTES NFR BLD: 2.6 % (ref 0–5)
LYMPHOCYTES # BLD AUTO: 3.03 10*3/MM3 (ref 0.72–4.86)
LYMPHOCYTES NFR BLD AUTO: 29.8 % (ref 15–45)
MCH RBC QN AUTO: 20.9 PG (ref 28–32)
MCHC RBC AUTO-ENTMCNC: 29.7 G/DL (ref 33–36)
MCV RBC AUTO: 70.5 FL (ref 82–95)
MICROCYTES BLD QL: NORMAL
MONOCYTES # BLD AUTO: 0.83 10*3/MM3 (ref 0.19–1.3)
MONOCYTES NFR BLD AUTO: 8.2 % (ref 4–12)
NEUTROPHILS # BLD AUTO: 5.66 10*3/MM3 (ref 1.87–8.4)
NEUTROPHILS NFR BLD AUTO: 55.7 % (ref 39–78)
NRBC BLD MANUAL-RTO: 0 /100 WBC (ref 0–0)
PLAT MORPH BLD: NORMAL
PLATELET # BLD AUTO: 281 10*3/MM3 (ref 130–400)
PMV BLD AUTO: 9.8 FL (ref 6–12)
POTASSIUM BLD-SCNC: 4.3 MMOL/L (ref 3.5–5.3)
RBC # BLD AUTO: 4.68 10*6/MM3 (ref 4.8–5.9)
SODIUM BLD-SCNC: 144 MMOL/L (ref 135–145)
WBC MORPH BLD: NORMAL
WBC NRBC COR # BLD: 10.16 10*3/MM3 (ref 4.8–10.8)

## 2017-06-18 PROCEDURE — 25010000002 ONDANSETRON PER 1 MG: Performed by: INTERNAL MEDICINE

## 2017-06-18 PROCEDURE — 85007 BL SMEAR W/DIFF WBC COUNT: CPT | Performed by: FAMILY MEDICINE

## 2017-06-18 PROCEDURE — 25010000002 THIAMINE PER 100 MG: Performed by: FAMILY MEDICINE

## 2017-06-18 PROCEDURE — 85025 COMPLETE CBC W/AUTO DIFF WBC: CPT | Performed by: FAMILY MEDICINE

## 2017-06-18 PROCEDURE — 80048 BASIC METABOLIC PNL TOTAL CA: CPT | Performed by: FAMILY MEDICINE

## 2017-06-18 RX ORDER — ALUMINA, MAGNESIA, AND SIMETHICONE 2400; 2400; 240 MG/30ML; MG/30ML; MG/30ML
10 SUSPENSION ORAL EVERY 6 HOURS
Status: DISCONTINUED | OUTPATIENT
Start: 2017-06-18 | End: 2017-06-24 | Stop reason: HOSPADM

## 2017-06-18 RX ORDER — FAMOTIDINE 10 MG/ML
20 INJECTION, SOLUTION INTRAVENOUS 2 TIMES DAILY
Status: DISCONTINUED | OUTPATIENT
Start: 2017-06-18 | End: 2017-06-24 | Stop reason: HOSPADM

## 2017-06-18 RX ORDER — MAGNESIUM HYDROXIDE/ALUMINUM HYDROXICE/SIMETHICONE 120; 1200; 1200 MG/30ML; MG/30ML; MG/30ML
10 SUSPENSION ORAL EVERY 8 HOURS
Status: DISCONTINUED | OUTPATIENT
Start: 2017-06-18 | End: 2017-06-18 | Stop reason: ALTCHOICE

## 2017-06-18 RX ORDER — FLUOXETINE HYDROCHLORIDE 20 MG/1
20 CAPSULE ORAL DAILY
Status: DISCONTINUED | OUTPATIENT
Start: 2017-06-18 | End: 2017-06-24 | Stop reason: HOSPADM

## 2017-06-18 RX ADMIN — PANTOPRAZOLE SODIUM 40 MG: 40 TABLET, DELAYED RELEASE ORAL at 05:43

## 2017-06-18 RX ADMIN — ALUMINUM HYDROXIDE, MAGNESIUM HYDROXIDE, AND DIMETHICONE 10 ML: 400; 400; 40 SUSPENSION ORAL at 15:55

## 2017-06-18 RX ADMIN — ALUMINUM HYDROXIDE, MAGNESIUM HYDROXIDE, AND DIMETHICONE 10 ML: 400; 400; 40 SUSPENSION ORAL at 10:51

## 2017-06-18 RX ADMIN — NICOTINE 1 PATCH: 21 PATCH, EXTENDED RELEASE TRANSDERMAL at 09:06

## 2017-06-18 RX ADMIN — ONDANSETRON 4 MG: 2 INJECTION INTRAMUSCULAR; INTRAVENOUS at 09:13

## 2017-06-18 RX ADMIN — QUETIAPINE FUMARATE 300 MG: 200 TABLET, FILM COATED ORAL at 20:08

## 2017-06-18 RX ADMIN — HYDROCODONE BITARTRATE AND ACETAMINOPHEN 1 TABLET: 7.5; 325 TABLET ORAL at 10:44

## 2017-06-18 RX ADMIN — TRAMADOL HYDROCHLORIDE 50 MG: 50 TABLET, COATED ORAL at 09:06

## 2017-06-18 RX ADMIN — TRAMADOL HYDROCHLORIDE 50 MG: 50 TABLET, COATED ORAL at 17:25

## 2017-06-18 RX ADMIN — FAMOTIDINE 20 MG: 10 INJECTION, SOLUTION INTRAVENOUS at 10:51

## 2017-06-18 RX ADMIN — GABAPENTIN 300 MG: 300 CAPSULE ORAL at 05:43

## 2017-06-18 RX ADMIN — GABAPENTIN 300 MG: 300 CAPSULE ORAL at 13:10

## 2017-06-18 RX ADMIN — CHLORDIAZEPOXIDE HYDROCHLORIDE 50 MG: 25 CAPSULE ORAL at 11:45

## 2017-06-18 RX ADMIN — FAMOTIDINE 20 MG: 10 INJECTION, SOLUTION INTRAVENOUS at 17:25

## 2017-06-18 RX ADMIN — FLUOXETINE HYDROCHLORIDE 20 MG: 20 CAPSULE ORAL at 11:45

## 2017-06-18 RX ADMIN — HYDROCODONE BITARTRATE AND ACETAMINOPHEN 1 TABLET: 7.5; 325 TABLET ORAL at 04:40

## 2017-06-18 RX ADMIN — FOLIC ACID 125 ML/HR: 5 INJECTION, SOLUTION INTRAMUSCULAR; INTRAVENOUS; SUBCUTANEOUS at 09:06

## 2017-06-18 RX ADMIN — CHLORDIAZEPOXIDE HYDROCHLORIDE 50 MG: 25 CAPSULE ORAL at 17:25

## 2017-06-18 RX ADMIN — GABAPENTIN 300 MG: 300 CAPSULE ORAL at 20:07

## 2017-06-18 RX ADMIN — CHLORDIAZEPOXIDE HYDROCHLORIDE 50 MG: 25 CAPSULE ORAL at 05:43

## 2017-06-18 RX ADMIN — HYDROCODONE BITARTRATE AND ACETAMINOPHEN 1 TABLET: 7.5; 325 TABLET ORAL at 18:32

## 2017-06-18 NOTE — PLAN OF CARE
Problem: Patient Care Overview (Adult)  Goal: Plan of Care Review  Outcome: Ongoing (interventions implemented as appropriate)    06/18/17 1333   Coping/Psychosocial Response Interventions   Plan Of Care Reviewed With patient   Patient Care Overview   Progress no change   Outcome Evaluation   Outcome Summary/Follow up Plan Pt. Medicated with PRN and scheduled pain medication with relief. Medicated with newly ordered medications for indigestion with relief. Banana bag infusing. Sitter at bedside. Safety maintained, will continue to monitor.        Goal: Adult Individualization and Mutuality  Outcome: Ongoing (interventions implemented as appropriate)  Goal: Discharge Needs Assessment  Outcome: Ongoing (interventions implemented as appropriate)    Problem: Fall Risk (Adult)  Goal: Absence of Falls  Outcome: Ongoing (interventions implemented as appropriate)    Problem: Renal Failure/Kidney Injury, Acute (Adult)  Goal: Signs and Symptoms of Listed Potential Problems Will be Absent or Manageable (Renal Failure/Kidney Injury, Acute)  Outcome: Ongoing (interventions implemented as appropriate)    Problem: Depression (Adult,Obstetrics,Pediatric)  Goal: Establish/Maintain Self-Care Routine  Outcome: Ongoing (interventions implemented as appropriate)  Goal: Improved/Stable Mood  Outcome: Ongoing (interventions implemented as appropriate)    Problem: Overdose, Ingestion/Inhalants (Adult)  Goal: Signs and Symptoms of Listed Potential Problems Will be Absent or Manageable (Overdose, Ingestion/Inhalants)  Outcome: Ongoing (interventions implemented as appropriate)    Problem: Suicide Risk (Adult,Obstetrics,Pediatric)  Goal: Strength-Based Wellness/Recovery  Outcome: Ongoing (interventions implemented as appropriate)  Goal: Physical Safety  Outcome: Ongoing (interventions implemented as appropriate)

## 2017-06-18 NOTE — PROGRESS NOTES
[facility] Medicine Services  INPATIENT PROGRESS NOTE    Length of Stay: 5  Date of Admission: 6/13/2017  Primary Care Physician: No Known Provider    Subjective   Chief Complaint: Suicidal thought    HPI   Suicidal thoughts.  [facility]. [facility] for [facility] be admitted at [facility] behavioral health for suicidal thoughts on Monday. Denies of any chest pain or sob.     Review of Systems   Constitutional: Negative for activity change, appetite change, chills and fever.   HENT: Negative for hearing loss, nosebleeds, tinnitus and trouble swallowing.    Eyes: Negative for visual disturbance.   Respiratory: Negative for cough, chest tightness, shortness of breath and wheezing.    Cardiovascular: Negative for chest pain, palpitations and leg swelling.   Gastrointestinal: Negative for abdominal distention, abdominal pain, blood in stool, constipation, diarrhea, nausea and vomiting.   Endocrine: Negative for cold intolerance, heat intolerance, polydipsia, polyphagia and polyuria.   Genitourinary: Negative for decreased urine volume, difficulty urinating, dysuria, flank pain, frequency and hematuria.   Musculoskeletal: Negative for arthralgias, joint swelling and myalgias.   Skin: Negative for rash.   Allergic/Immunologic: Negative for immunocompromised state.   Neurological: Negative for dizziness, syncope, weakness, light-headedness and headaches.   Hematological: Negative for adenopathy. Does not bruise/bleed easily.   Psychiatric/Behavioral: Negative for confusion and sleep disturbance. The patient is not nervous/anxious.           All pertinent negatives and positives are as above. All other systems have been reviewed and are negative unless otherwise stated.     Objective    Temp:  [97.6 °F (36.4 °C)-98.2 °F (36.8 °C)] 97.6 °F (36.4 °C)  Heart Rate:  [80-94] 81  Resp:  [14-20] 20  BP: (105-147)/(61-98)  147/76    Intake/Output Summary (Last 24 hours) at 06/18/17 1424  Last data filed at 06/18/17 1300   Gross per 24 hour   Intake             1960 ml   Output                0 ml   Net             1960 ml     Physical Exam   Constitutional: He is oriented to person, place, and time. He appears well-developed and well-nourished.   HENT:   Head: Normocephalic and atraumatic.   Eyes: Conjunctivae and EOM are normal. Pupils are equal, round, and reactive to light.   Neck: Neck supple. No JVD present. No thyromegaly present.   Cardiovascular: Normal rate, regular rhythm, normal heart sounds and intact distal pulses.  Exam reveals no gallop and no friction rub.    No murmur heard.  Pulmonary/Chest: Effort normal and breath sounds normal. No respiratory distress. He has no wheezes. He has no rales. He exhibits no tenderness.   Abdominal: Soft. Bowel sounds are normal. He exhibits no distension. There is no tenderness. There is no rebound and no guarding.   Musculoskeletal: Normal range of motion. He exhibits no edema, tenderness or deformity.   Lymphadenopathy:     He has no cervical adenopathy.   Neurological: He is alert and oriented to person, place, and time. He displays normal reflexes. No cranial nerve deficit. He exhibits normal muscle tone.   Skin: Skin is warm and dry. No rash noted.   Psychiatric: He has a normal mood and affect. His behavior is normal. Judgment and thought content normal.       Results Review:  Lab Results (last 24 hours)     Procedure Component Value Units Date/Time    Basic Metabolic Panel [[id number]]  (Abnormal) Collected:  06/18/17 0422    Specimen:  Blood Updated:  06/18/17 0521     Glucose 91 mg/dL      BUN 15 mg/dL      Creatinine 0.90 mg/dL      Sodium 144 mmol/L      Potassium 4.3 mmol/L      Chloride 105 mmol/L      CO2 25.0 mmol/L      Calcium 9.5 mg/dL      eGFR Non African Amer 88 mL/min/1.73      BUN/Creatinine Ratio 16.7     Anion Gap 14.0 (H) mmol/L     Narrative:       GFR Normal  >60  Chronic Kidney Disease <60  Kidney Failure <15    CBC & Differential [[id number]] Collected:  06/18/17 0525    Specimen:  Blood Updated:  06/18/17 0629    Narrative:       The following orders were created for panel order CBC & Differential.  Procedure                               Abnormality         Status                     ---------                               -----------         ------                     Scan Slide[[id number]]                                       Final result               CBC Auto Differential[[id number]]        Abnormal            Final result                 Please view results for these tests on the individual orders.    CBC Auto Differential [[id number]]  (Abnormal) Collected:  06/18/17 0525    Specimen:  Blood Updated:  06/18/17 0629     WBC 10.16 10*3/mm3      RBC 4.68 (L) 10*6/mm3      Hemoglobin 9.8 (L) g/dL      Hematocrit 33.0 (L) %      MCV 70.5 (L) fL      MCH 20.9 (L) pg      MCHC 29.7 (L) g/dL      RDW 22.7 (H) %      RDW-SD 56.8 (H) fl      MPV 9.8 fL      Platelets 281 10*3/mm3      Neutrophil % 55.7 %      Lymphocyte % 29.8 %      Monocyte % 8.2 %      Eosinophil % 3.4 %      Basophil % 0.3 %      Immature Grans % 2.6 %      Neutrophils, Absolute 5.66 10*3/mm3      Lymphocytes, Absolute 3.03 10*3/mm3      Monocytes, Absolute 0.83 10*3/mm3      Eosinophils, Absolute 0.35 10*3/mm3      Basophils, Absolute 0.03 10*3/mm3      Immature Grans, Absolute 0.26 (H) 10*3/mm3      nRBC 0.0 /100 WBC     Scan Slide [[id number]] Collected:  06/18/17 0525    Specimen:  Blood Updated:  06/18/17 0629     Anisocytosis Slight/1+     Microcytes Mod/2+     WBC Morphology Normal     Platelet Morphology Normal           Cultures:  Urine Culture   Date Value Ref Range Status   06/14/2017 No growth at 2 days  Final       Radiology Data:    Imaging Results (last 24 hours)     ** No results found for the last 24 hours. **          No Known Allergies    Scheduled meds:     aluminum-magnesium  hydroxide-simethicone 10 mL Oral Q6H   chlordiazePOXIDE 50 mg Oral Q6H   famotidine 20 mg Intravenous BID   FLUoxetine 20 mg Oral Daily   gabapentin 300 mg Oral Q8H   IV Fluids 1000 mL + additives 125 mL/hr Intravenous Daily   nicotine 1 patch Transdermal Daily   pantoprazole 40 mg Oral Q AM   QUEtiapine 300 mg Oral Nightly   traMADol 50 mg Oral BID       PRN meds:  HYDROcodone-acetaminophen  •  ondansetron    Assessment/Plan     Active Problems:    Acute renal failure    Altered mental status      Plan:  Altered mental status/drug overdose- Ct head, no acute change. Tylenol and salicylate levels - low, ammonia level and lactic acid level- low. Patient threating suicidal. . Suicidal watch for now.       renal failure - resolved      Hyperkalemia- resolved       Chronic pain-put patient back as Ultram and decrease the dose of Norco. Continue Neurontin.      Anemia- S/P transfuse 1 unit of PRBCs. Stable.   Elevated D-dimer. V/Q, low probability.      Alcohol abuse- start Librium    Anxiety- Prozac.      Diet regular      Discharge Planning: Plan to transfer patient to [facility] behavioral health for suicidal thoughts and Monday.    Mynor Kaye MD   06/18/17   2:24 PM

## 2017-06-18 NOTE — PLAN OF CARE
Problem: Patient Care Overview (Adult)  Goal: Plan of Care Review  Outcome: Ongoing (interventions implemented as appropriate)    06/18/17 0246   Coping/Psychosocial Response Interventions   Plan Of Care Reviewed With patient   Patient Care Overview   Progress no change   Outcome Evaluation   Outcome Summary/Follow up Plan Medicated for c/o of back and neck pain with prn pain med, effective towards relief. Sitter at bedside. Neuro wnl. Plans for patient to be discharged to [facility] when bed is available. Continue to monitor.        Goal: Adult Individualization and Mutuality  Outcome: Ongoing (interventions implemented as appropriate)  Goal: Discharge Needs Assessment  Outcome: Ongoing (interventions implemented as appropriate)    Problem: Fall Risk (Adult)  Goal: Absence of Falls  Outcome: Ongoing (interventions implemented as appropriate)    Problem: Renal Failure/Kidney Injury, Acute (Adult)  Goal: Signs and Symptoms of Listed Potential Problems Will be Absent or Manageable (Renal Failure/Kidney Injury, Acute)  Outcome: Ongoing (interventions implemented as appropriate)    Problem: Depression (Adult,Obstetrics,Pediatric)  Goal: Establish/Maintain Self-Care Routine  Outcome: Ongoing (interventions implemented as appropriate)  Goal: Improved/Stable Mood  Outcome: Ongoing (interventions implemented as appropriate)    Problem: Overdose, Ingestion/Inhalants (Adult)  Goal: Signs and Symptoms of Listed Potential Problems Will be Absent or Manageable (Overdose, Ingestion/Inhalants)  Outcome: Ongoing (interventions implemented as appropriate)    Problem: Suicide Risk (Adult,Obstetrics,Pediatric)  Goal: Strength-Based Wellness/Recovery  Outcome: Ongoing (interventions implemented as appropriate)  Goal: Physical Safety  Outcome: Ongoing (interventions implemented as appropriate)

## 2017-06-19 LAB
ANION GAP SERPL CALCULATED.3IONS-SCNC: 13 MMOL/L (ref 4–13)
ANISOCYTOSIS BLD QL: NORMAL
BASOPHILS # BLD AUTO: 0.02 10*3/MM3 (ref 0–0.2)
BASOPHILS NFR BLD AUTO: 0.3 % (ref 0–2)
BUN BLD-MCNC: 16 MG/DL (ref 5–21)
BUN/CREAT SERPL: 18.4 (ref 7–25)
CALCIUM SPEC-SCNC: 9 MG/DL (ref 8.4–10.4)
CHLORIDE SERPL-SCNC: 104 MMOL/L (ref 98–110)
CO2 SERPL-SCNC: 25 MMOL/L (ref 24–31)
CREAT BLD-MCNC: 0.87 MG/DL (ref 0.5–1.4)
DEPRECATED RDW RBC AUTO: 58.8 FL (ref 40–54)
EOSINOPHIL # BLD AUTO: 0.25 10*3/MM3 (ref 0–0.7)
EOSINOPHIL NFR BLD AUTO: 3.4 % (ref 0–4)
ERYTHROCYTE [DISTWIDTH] IN BLOOD BY AUTOMATED COUNT: 23.1 % (ref 12–15)
GFR SERPL CREATININE-BSD FRML MDRD: 91 ML/MIN/1.73
GLUCOSE BLD-MCNC: 100 MG/DL (ref 70–100)
HCT VFR BLD AUTO: 32.7 % (ref 40–52)
HGB BLD-MCNC: 9.5 G/DL (ref 14–18)
HYPOCHROMIA BLD QL: NORMAL
IMM GRANULOCYTES # BLD: 0.08 10*3/MM3 (ref 0–0.03)
IMM GRANULOCYTES NFR BLD: 1.1 % (ref 0–5)
LYMPHOCYTES # BLD AUTO: 2.52 10*3/MM3 (ref 0.72–4.86)
LYMPHOCYTES NFR BLD AUTO: 34.2 % (ref 15–45)
MCH RBC QN AUTO: 20.8 PG (ref 28–32)
MCHC RBC AUTO-ENTMCNC: 29.1 G/DL (ref 33–36)
MCV RBC AUTO: 71.6 FL (ref 82–95)
MICROCYTES BLD QL: NORMAL
MONOCYTES # BLD AUTO: 0.67 10*3/MM3 (ref 0.19–1.3)
MONOCYTES NFR BLD AUTO: 9.1 % (ref 4–12)
NEUTROPHILS # BLD AUTO: 3.82 10*3/MM3 (ref 1.87–8.4)
NEUTROPHILS NFR BLD AUTO: 51.9 % (ref 39–78)
NRBC BLD MANUAL-RTO: 0 /100 WBC (ref 0–0)
PLAT MORPH BLD: NORMAL
PLATELET # BLD AUTO: 298 10*3/MM3 (ref 130–400)
PMV BLD AUTO: 10.1 FL (ref 6–12)
POIKILOCYTOSIS BLD QL SMEAR: NORMAL
POLYCHROMASIA BLD QL SMEAR: NORMAL
POTASSIUM BLD-SCNC: 3.9 MMOL/L (ref 3.5–5.3)
RBC # BLD AUTO: 4.57 10*6/MM3 (ref 4.8–5.9)
SODIUM BLD-SCNC: 142 MMOL/L (ref 135–145)
STOMATOCYTES BLD QL SMEAR: NORMAL
WBC MORPH BLD: NORMAL
WBC NRBC COR # BLD: 7.36 10*3/MM3 (ref 4.8–10.8)

## 2017-06-19 PROCEDURE — 80048 BASIC METABOLIC PNL TOTAL CA: CPT | Performed by: FAMILY MEDICINE

## 2017-06-19 PROCEDURE — 85007 BL SMEAR W/DIFF WBC COUNT: CPT | Performed by: FAMILY MEDICINE

## 2017-06-19 PROCEDURE — 85025 COMPLETE CBC W/AUTO DIFF WBC: CPT | Performed by: FAMILY MEDICINE

## 2017-06-19 RX ADMIN — ALUMINUM HYDROXIDE, MAGNESIUM HYDROXIDE, AND DIMETHICONE 10 ML: 400; 400; 40 SUSPENSION ORAL at 18:18

## 2017-06-19 RX ADMIN — CHLORDIAZEPOXIDE HYDROCHLORIDE 50 MG: 25 CAPSULE ORAL at 05:23

## 2017-06-19 RX ADMIN — CHLORDIAZEPOXIDE HYDROCHLORIDE 50 MG: 25 CAPSULE ORAL at 12:01

## 2017-06-19 RX ADMIN — GABAPENTIN 300 MG: 300 CAPSULE ORAL at 05:23

## 2017-06-19 RX ADMIN — CHLORDIAZEPOXIDE HYDROCHLORIDE 50 MG: 25 CAPSULE ORAL at 18:18

## 2017-06-19 RX ADMIN — GABAPENTIN 300 MG: 300 CAPSULE ORAL at 21:11

## 2017-06-19 RX ADMIN — ALUMINUM HYDROXIDE, MAGNESIUM HYDROXIDE, AND DIMETHICONE 10 ML: 400; 400; 40 SUSPENSION ORAL at 05:23

## 2017-06-19 RX ADMIN — GABAPENTIN 300 MG: 300 CAPSULE ORAL at 13:45

## 2017-06-19 RX ADMIN — ALUMINUM HYDROXIDE, MAGNESIUM HYDROXIDE, AND DIMETHICONE 10 ML: 400; 400; 40 SUSPENSION ORAL at 23:33

## 2017-06-19 RX ADMIN — HYDROCODONE BITARTRATE AND ACETAMINOPHEN 1 TABLET: 7.5; 325 TABLET ORAL at 07:11

## 2017-06-19 RX ADMIN — ALUMINUM HYDROXIDE, MAGNESIUM HYDROXIDE, AND DIMETHICONE 10 ML: 400; 400; 40 SUSPENSION ORAL at 01:09

## 2017-06-19 RX ADMIN — FAMOTIDINE 20 MG: 10 INJECTION, SOLUTION INTRAVENOUS at 18:18

## 2017-06-19 RX ADMIN — FAMOTIDINE 20 MG: 10 INJECTION, SOLUTION INTRAVENOUS at 08:58

## 2017-06-19 RX ADMIN — HYDROCODONE BITARTRATE AND ACETAMINOPHEN 1 TABLET: 7.5; 325 TABLET ORAL at 01:12

## 2017-06-19 RX ADMIN — CHLORDIAZEPOXIDE HYDROCHLORIDE 50 MG: 25 CAPSULE ORAL at 23:33

## 2017-06-19 RX ADMIN — FLUOXETINE HYDROCHLORIDE 20 MG: 20 CAPSULE ORAL at 08:41

## 2017-06-19 RX ADMIN — HYDROCODONE BITARTRATE AND ACETAMINOPHEN 1 TABLET: 7.5; 325 TABLET ORAL at 21:11

## 2017-06-19 RX ADMIN — CHLORDIAZEPOXIDE HYDROCHLORIDE 50 MG: 25 CAPSULE ORAL at 01:09

## 2017-06-19 RX ADMIN — HYDROCODONE BITARTRATE AND ACETAMINOPHEN 1 TABLET: 7.5; 325 TABLET ORAL at 13:06

## 2017-06-19 RX ADMIN — NICOTINE 1 PATCH: 21 PATCH, EXTENDED RELEASE TRANSDERMAL at 08:43

## 2017-06-19 RX ADMIN — PANTOPRAZOLE SODIUM 40 MG: 40 TABLET, DELAYED RELEASE ORAL at 05:23

## 2017-06-19 RX ADMIN — QUETIAPINE FUMARATE 300 MG: 200 TABLET, FILM COATED ORAL at 21:11

## 2017-06-19 RX ADMIN — TRAMADOL HYDROCHLORIDE 50 MG: 50 TABLET, COATED ORAL at 08:41

## 2017-06-19 RX ADMIN — ALUMINUM HYDROXIDE, MAGNESIUM HYDROXIDE, AND DIMETHICONE 10 ML: 400; 400; 40 SUSPENSION ORAL at 10:41

## 2017-06-19 RX ADMIN — TRAMADOL HYDROCHLORIDE 50 MG: 50 TABLET, COATED ORAL at 18:18

## 2017-06-19 NOTE — PROGRESS NOTES
Continued Stay Note  [facility]     Patient Name: Magnus Grande  MRN: [id number]  Today's Date: 6/19/2017    Admit Date: 6/13/2017          Discharge Plan       06/19/17 1003    Case Management/Social Work Plan    Plan Undetermined    Additional Comments OMAR called [facility] and spoke to Rachel Ramsey RN (admissions coordinator). Rachel states that patient is being considered and she will call SW back when referral has been reviewed.               Discharge Codes     None            QUETA Stark

## 2017-06-19 NOTE — PLAN OF CARE
Problem: Patient Care Overview (Adult)  Goal: Plan of Care Review  Outcome: Ongoing (interventions implemented as appropriate)    06/19/17 1841   Coping/Psychosocial Response Interventions   Plan Of Care Reviewed With patient   Outcome Evaluation   Outcome Summary/Follow up Plan pt medicated x 1 for c/o pain this shift. pt express anger toward me today re; unable to go into bathroom. explained policy. sitter at bedside. refused ivf. and banana bag. awaiting bed to transfer to [facility].          Problem: Fall Risk (Adult)  Goal: Absence of Falls  Outcome: Ongoing (interventions implemented as appropriate)    06/19/17 1841   Fall Risk (Adult)   Absence of Falls making progress toward outcome         Problem: Depression (Adult,Obstetrics,Pediatric)  Goal: Establish/Maintain Self-Care Routine  Outcome: Ongoing (interventions implemented as appropriate)    06/19/17 1841   Depression (Adult,Obstetrics,Pediatric)   Establish/Maintain Self-Care Routine making progress toward outcome         Problem: Suicide Risk (Adult,Obstetrics,Pediatric)  Goal: Strength-Based Wellness/Recovery  Outcome: Ongoing (interventions implemented as appropriate)    06/19/17 1841   Suicide Risk (Adult,Obstetrics,Pediatric)   Strength-Based Wellness/Recovery making progress toward outcome       Goal: Physical Safety  Outcome: Ongoing (interventions implemented as appropriate)    06/19/17 1841   Suicide Risk (Adult,Obstetrics,Pediatric)   Physical Safety making progress toward outcome

## 2017-06-19 NOTE — PLAN OF CARE
Problem: Patient Care Overview (Adult)  Goal: Plan of Care Review  Outcome: Ongoing (interventions implemented as appropriate)    06/19/17 0326   Coping/Psychosocial Response Interventions   Plan Of Care Reviewed With patient   Patient Care Overview   Progress no change   Outcome Evaluation   Outcome Summary/Follow up Plan pt expressed a difficult day yesterday, but has had an improved night so far, has been able to sleep for most of night, VSS, no comlaints of indigestion, safety miantained, sitter at bedside, will ocnt. to monitor.         Problem: Fall Risk (Adult)  Goal: Absence of Falls  Outcome: Ongoing (interventions implemented as appropriate)    Problem: Renal Failure/Kidney Injury, Acute (Adult)  Goal: Signs and Symptoms of Listed Potential Problems Will be Absent or Manageable (Renal Failure/Kidney Injury, Acute)  Outcome: Ongoing (interventions implemented as appropriate)    Problem: Depression (Adult,Obstetrics,Pediatric)  Goal: Establish/Maintain Self-Care Routine  Outcome: Ongoing (interventions implemented as appropriate)  Goal: Improved/Stable Mood  Outcome: Ongoing (interventions implemented as appropriate)    Problem: Overdose, Ingestion/Inhalants (Adult)  Goal: Signs and Symptoms of Listed Potential Problems Will be Absent or Manageable (Overdose, Ingestion/Inhalants)  Outcome: Ongoing (interventions implemented as appropriate)    Problem: Suicide Risk (Adult,Obstetrics,Pediatric)  Goal: Strength-Based Wellness/Recovery  Outcome: Ongoing (interventions implemented as appropriate)  Goal: Physical Safety  Outcome: Ongoing (interventions implemented as appropriate)

## 2017-06-19 NOTE — PROGRESS NOTES
Continued Stay Note  [facility]     Patient Name: Magnus Grande  MRN: [id number]  Today's Date: 6/19/2017    Admit Date: 6/13/2017          Discharge Plan       06/19/17 3457    Case Management/Social Work Plan    Plan Possibly [facility]    Additional Comments OMAR called [facility] and spoke to Rachel Ramsey again re status of referral. Rachel states patient will likely be accepted but she is unsure when a bed will be available. Rachel says she will call  back to advise when a bed may be available.               Discharge Codes     None            QUETA Stark

## 2017-06-19 NOTE — PROGRESS NOTES
[facility] Medicine Services  INPATIENT PROGRESS NOTE    Length of Stay: 6  Date of Admission: 6/13/2017  Primary Care Physician: No Known Provider    Subjective   Chief Complaint: Suicidal thought    Drug Overdose   Pertinent negatives include no abdominal pain, arthralgias, chest pain, chills, coughing, fever, headaches, joint swelling, myalgias, nausea, rash, vomiting or weakness.      Suicidal thoughts.  [facility]. [facility] for [facility] be admitted at [facility] for suicidal thoughts onToday. Denies of any chest pain or sob. At this time they still don't have any beds.  Patient is calm no tremors.    Review of Systems   Constitutional: Negative for activity change, appetite change, chills and fever.   HENT: Negative for hearing loss, nosebleeds, tinnitus and trouble swallowing.    Eyes: Negative for visual disturbance.   Respiratory: Negative for cough, chest tightness, shortness of breath and wheezing.    Cardiovascular: Negative for chest pain, palpitations and leg swelling.   Gastrointestinal: Negative for abdominal distention, abdominal pain, blood in stool, constipation, diarrhea, nausea and vomiting.   Endocrine: Negative for cold intolerance, heat intolerance, polydipsia, polyphagia and polyuria.   Genitourinary: Negative for decreased urine volume, difficulty urinating, dysuria, flank pain, frequency and hematuria.   Musculoskeletal: Negative for arthralgias, joint swelling and myalgias.   Skin: Negative for rash.   Allergic/Immunologic: Negative for immunocompromised state.   Neurological: Negative for dizziness, syncope, weakness, light-headedness and headaches.   Hematological: Negative for adenopathy. Does not bruise/bleed easily.   Psychiatric/Behavioral: Negative for confusion and sleep disturbance. The patient is not nervous/anxious.           All pertinent negatives and positives  are as above. All other systems have been reviewed and are negative unless otherwise stated.     Objective    Temp:  [97.2 °F (36.2 °C)-97.9 °F (36.6 °C)] 97.2 °F (36.2 °C)  Heart Rate:  [71-96] 79  Resp:  [18-20] 18  BP: (114-142)/(74-87) 139/74    Intake/Output Summary (Last 24 hours) at 06/19/17 1405  Last data filed at 06/19/17 1300   Gross per 24 hour   Intake             3630 ml   Output             1750 ml   Net             1880 ml     Physical Exam   Constitutional: He is oriented to person, place, and time. He appears well-developed and well-nourished.   HENT:   Head: Normocephalic and atraumatic.   Eyes: Conjunctivae and EOM are normal. Pupils are equal, round, and reactive to light.   Neck: Neck supple. No JVD present. No thyromegaly present.   Cardiovascular: Normal rate, regular rhythm, normal heart sounds and intact distal pulses.  Exam reveals no gallop and no friction rub.    No murmur heard.  Pulmonary/Chest: Effort normal and breath sounds normal. No respiratory distress. He has no wheezes. He has no rales. He exhibits no tenderness.   Abdominal: Soft. Bowel sounds are normal. He exhibits no distension. There is no tenderness. There is no rebound and no guarding.   Musculoskeletal: Normal range of motion. He exhibits no edema, tenderness or deformity.   Lymphadenopathy:     He has no cervical adenopathy.   Neurological: He is alert and oriented to person, place, and time. He displays normal reflexes. No cranial nerve deficit. He exhibits normal muscle tone.   Skin: Skin is warm and dry. No rash noted.   Psychiatric: He has a normal mood and affect. His behavior is normal. Judgment and thought content normal.       Results Review:  Lab Results (last 24 hours)     Procedure Component Value Units Date/Time    Basic Metabolic Panel [[id number]]  (Normal) Collected:  06/19/17 0525    Specimen:  Blood Updated:  06/19/17 0608     Glucose 100 mg/dL      BUN 16 mg/dL      Creatinine 0.87 mg/dL      Sodium  142 mmol/L      Potassium 3.9 mmol/L      Chloride 104 mmol/L      CO2 25.0 mmol/L      Calcium 9.0 mg/dL      eGFR Non African Amer 91 mL/min/1.73      BUN/Creatinine Ratio 18.4     Anion Gap 13.0 mmol/L     Narrative:       GFR Normal >60  Chronic Kidney Disease <60  Kidney Failure <15    CBC & Differential [[id number]] Collected:  06/19/17 0540    Specimen:  Blood Updated:  06/19/17 0730    Narrative:       The following orders were created for panel order CBC & Differential.  Procedure                               Abnormality         Status                     ---------                               -----------         ------                     Scan Slide[[id number]]                                       Final result               CBC Auto Differential[[id number]]        Abnormal            Final result                 Please view results for these tests on the individual orders.    CBC Auto Differential [[id number]]  (Abnormal) Collected:  06/19/17 0540    Specimen:  Blood Updated:  06/19/17 0730     WBC 7.36 10*3/mm3      RBC 4.57 (L) 10*6/mm3      Hemoglobin 9.5 (L) g/dL      Hematocrit 32.7 (L) %      MCV 71.6 (L) fL      MCH 20.8 (L) pg      MCHC 29.1 (L) g/dL      RDW 23.1 (H) %      RDW-SD 58.8 (H) fl      MPV 10.1 fL      Platelets 298 10*3/mm3      Neutrophil % 51.9 %      Lymphocyte % 34.2 %      Monocyte % 9.1 %      Eosinophil % 3.4 %      Basophil % 0.3 %      Immature Grans % 1.1 %      Neutrophils, Absolute 3.82 10*3/mm3      Lymphocytes, Absolute 2.52 10*3/mm3      Monocytes, Absolute 0.67 10*3/mm3      Eosinophils, Absolute 0.25 10*3/mm3      Basophils, Absolute 0.02 10*3/mm3      Immature Grans, Absolute 0.08 (H) 10*3/mm3      nRBC 0.0 /100 WBC     Scan Slide [[id number]] Collected:  06/19/17 0540    Specimen:  Blood Updated:  06/19/17 0730     Anisocytosis Mod/2+     Hypochromia Slight/1+     Microcytes Slight/1+     Poikilocytes Slight/1+     Polychromasia Slight/1+     Stomatocytes Slight/1+      WBC Morphology Normal     Platelet Morphology Normal           Cultures:  Urine Culture   Date Value Ref Range Status   06/14/2017 No growth at 2 days  Final       Radiology Data:    Imaging Results (last 24 hours)     ** No results found for the last 24 hours. **          No Known Allergies    Scheduled meds:     aluminum-magnesium hydroxide-simethicone 10 mL Oral Q6H   chlordiazePOXIDE 50 mg Oral Q6H   famotidine 20 mg Intravenous BID   FLUoxetine 20 mg Oral Daily   gabapentin 300 mg Oral Q8H   IV Fluids 1000 mL + additives 125 mL/hr Intravenous Daily   nicotine 1 patch Transdermal Daily   pantoprazole 40 mg Oral Q AM   QUEtiapine 300 mg Oral Nightly   traMADol 50 mg Oral BID       PRN meds:  HYDROcodone-acetaminophen  •  ondansetron    Assessment/Plan     Active Problems:    Acute renal failure    Altered mental status      Plan:  Altered mental status/drug overdose- Ct head, no acute change. Tylenol and salicylate levels - low, ammonia level and lactic acid level- low. Patient threating suicidal. . Suicidal watch for now.       renal failure - resolved      Hyperkalemia- resolved       Chronic pain-put patient back as Ultram and decrease the dose of Norco. Continue Neurontin.      Anemia- S/P transfuse 1 unit of PRBCs. Stable.   Elevated D-dimer. V/Q, low probability.      Alcohol abuse- start Librium    Anxiety- Prozac.      Diet regular      Discharge Planning: Plan to transfer patient to [facility] behavioral [facility] for suicidal thoughts when bed available..    Davie Carl MD   06/19/17   2:05 PM

## 2017-06-20 RX ADMIN — FAMOTIDINE 20 MG: 10 INJECTION, SOLUTION INTRAVENOUS at 17:22

## 2017-06-20 RX ADMIN — GABAPENTIN 300 MG: 300 CAPSULE ORAL at 21:20

## 2017-06-20 RX ADMIN — QUETIAPINE FUMARATE 300 MG: 200 TABLET, FILM COATED ORAL at 20:05

## 2017-06-20 RX ADMIN — ALUMINUM HYDROXIDE, MAGNESIUM HYDROXIDE, AND DIMETHICONE 10 ML: 400; 400; 40 SUSPENSION ORAL at 04:03

## 2017-06-20 RX ADMIN — TRAMADOL HYDROCHLORIDE 50 MG: 50 TABLET, COATED ORAL at 17:22

## 2017-06-20 RX ADMIN — HYDROCODONE BITARTRATE AND ACETAMINOPHEN 1 TABLET: 7.5; 325 TABLET ORAL at 20:05

## 2017-06-20 RX ADMIN — TRAMADOL HYDROCHLORIDE 50 MG: 50 TABLET, COATED ORAL at 09:06

## 2017-06-20 RX ADMIN — HYDROCODONE BITARTRATE AND ACETAMINOPHEN 1 TABLET: 7.5; 325 TABLET ORAL at 13:22

## 2017-06-20 RX ADMIN — CHLORDIAZEPOXIDE HYDROCHLORIDE 50 MG: 25 CAPSULE ORAL at 17:22

## 2017-06-20 RX ADMIN — GABAPENTIN 300 MG: 300 CAPSULE ORAL at 13:22

## 2017-06-20 RX ADMIN — CHLORDIAZEPOXIDE HYDROCHLORIDE 50 MG: 25 CAPSULE ORAL at 06:15

## 2017-06-20 RX ADMIN — PANTOPRAZOLE SODIUM 40 MG: 40 TABLET, DELAYED RELEASE ORAL at 06:15

## 2017-06-20 RX ADMIN — HYDROCODONE BITARTRATE AND ACETAMINOPHEN 1 TABLET: 7.5; 325 TABLET ORAL at 04:03

## 2017-06-20 RX ADMIN — CHLORDIAZEPOXIDE HYDROCHLORIDE 50 MG: 25 CAPSULE ORAL at 12:14

## 2017-06-20 RX ADMIN — ALUMINUM HYDROXIDE, MAGNESIUM HYDROXIDE, AND DIMETHICONE 10 ML: 400; 400; 40 SUSPENSION ORAL at 10:06

## 2017-06-20 RX ADMIN — ALUMINUM HYDROXIDE, MAGNESIUM HYDROXIDE, AND DIMETHICONE 10 ML: 400; 400; 40 SUSPENSION ORAL at 21:24

## 2017-06-20 RX ADMIN — ALUMINUM HYDROXIDE, MAGNESIUM HYDROXIDE, AND DIMETHICONE 10 ML: 400; 400; 40 SUSPENSION ORAL at 16:04

## 2017-06-20 RX ADMIN — FLUOXETINE HYDROCHLORIDE 20 MG: 20 CAPSULE ORAL at 09:06

## 2017-06-20 RX ADMIN — GABAPENTIN 300 MG: 300 CAPSULE ORAL at 06:15

## 2017-06-20 RX ADMIN — NICOTINE 1 PATCH: 21 PATCH, EXTENDED RELEASE TRANSDERMAL at 09:05

## 2017-06-20 RX ADMIN — FAMOTIDINE 20 MG: 10 INJECTION, SOLUTION INTRAVENOUS at 09:06

## 2017-06-20 NOTE — PLAN OF CARE
Problem: Patient Care Overview (Adult)  Goal: Plan of Care Review  Outcome: Ongoing (interventions implemented as appropriate)    06/20/17 0344   Coping/Psychosocial Response Interventions   Plan Of Care Reviewed With patient   Patient Care Overview   Progress no change   Outcome Evaluation   Outcome Summary/Follow up Plan Pt requested pain meds at 0300, but was sleeping entire time RN was in room to relieve sitter. Pt upset he is under watch and cannot go to bathroom on his own, or do what he wants. Suicide precautions in place. Safety maintained.          Problem: Fall Risk (Adult)  Goal: Absence of Falls  Outcome: Ongoing (interventions implemented as appropriate)    Problem: Depression (Adult,Obstetrics,Pediatric)  Goal: Establish/Maintain Self-Care Routine  Outcome: Ongoing (interventions implemented as appropriate)  Goal: Improved/Stable Mood  Outcome: Ongoing (interventions implemented as appropriate)    Problem: Overdose, Ingestion/Inhalants (Adult)  Goal: Signs and Symptoms of Listed Potential Problems Will be Absent or Manageable (Overdose, Ingestion/Inhalants)  Outcome: Ongoing (interventions implemented as appropriate)    Problem: Suicide Risk (Adult,Obstetrics,Pediatric)  Goal: Strength-Based Wellness/Recovery  Outcome: Ongoing (interventions implemented as appropriate)  Goal: Physical Safety  Outcome: Ongoing (interventions implemented as appropriate)

## 2017-06-20 NOTE — PROGRESS NOTES
Continued Stay Note  [facility]     Patient Name: Magnus Grande  MRN: [id number]  Today's Date: 6/20/2017    Admit Date: 6/13/2017          Discharge Plan       06/20/17 1457    Case Management/Social Work Plan    Plan Psych placement    Additional Comments Alpa at [facility] called SW back to inform that Dr. Obrien will not take patient today but advised for OMAR to call back in AM.       06/20/17 1404    Case Management/Social Work Plan    Plan Psych placement    Additional Comments Patient has been clinically accepted at [facility] pending bed availability. No bed available today. Re [facility] Psych referral: SW spoke to Alpa at [facility] and she states she is unsure if patient will be accepted but she will call Dr. Obrien re this and will call SW back.               Discharge Codes     None            Yarelis Phelps, JOSHUAW

## 2017-06-20 NOTE — PROGRESS NOTES
Continued Stay Note  [facility]     Patient Name: Magnus Grande  MRN: [id number]  Today's Date: 6/20/2017    Admit Date: 6/13/2017          Discharge Plan       06/20/17 1404    Case Management/Social Work Plan    Plan Psych placement    Additional Comments Patient has been clinically accepted at [facility] pending bed availability. No bed available today. Re [facility] Psych referral: OMAR spoke to Alpa at [facility] and she states she is unsure if patient will be accepted but she will call Dr. Obrien re this and will call OMAR back.       06/20/17 1123    Case Management/Social Work Plan    Plan Psych placement    Additional Comments Rachel Ramsey called to request the most recent labs and imaging. OMAR faxed this to Rachel at [phone]. Will continue to follow for definite plans.               Discharge Codes     None            QUETA Stark

## 2017-06-20 NOTE — PROGRESS NOTES
Continued Stay Note  [facility]     Patient Name: Magnus Grande  MRN: [id number]  Today's Date: 6/20/2017    Admit Date: 6/13/2017          Discharge Plan       06/20/17 1043    Case Management/Social Work Plan    Plan Psych placement    Additional Comments Apparently patient has called around to different assisted living facilities to see about openings. SW spoke to patient in room to explain that he will not be able to admit to an assisted living facility. [facility] has recommended inpatient psych placement (voluntary) and due to this, patient can not admit to a different type of facility. SW does not feel that patient truly understands this. SW did make referral to [facility] in hopes that they will have a bed available since [facility] does not at the moment. Patient agrees.       06/20/17 1014    Case Management/Social Work Plan    Plan Undetermined    Additional Comments OMAR spoke to Rachel Ramsey at [facility] and she states that there will not be a bed available today. Rachel states she will call  back to advise of definite acceptance.               Discharge Codes     None            JOSHUA StarkW

## 2017-06-20 NOTE — PLAN OF CARE
Problem: Patient Care Overview (Adult)  Goal: Plan of Care Review  Outcome: Ongoing (interventions implemented as appropriate)    06/20/17 1829   Coping/Psychosocial Response Interventions   Plan Of Care Reviewed With patient   Patient Care Overview   Progress no change   Outcome Evaluation   Outcome Summary/Follow up Plan Up ad xander. Sitter present. VSS. Await placement.          Problem: Fall Risk (Adult)  Goal: Absence of Falls  Outcome: Ongoing (interventions implemented as appropriate)    Problem: Depression (Adult,Obstetrics,Pediatric)  Goal: Establish/Maintain Self-Care Routine  Outcome: Ongoing (interventions implemented as appropriate)  Goal: Improved/Stable Mood  Outcome: Ongoing (interventions implemented as appropriate)    Problem: Overdose, Ingestion/Inhalants (Adult)  Goal: Signs and Symptoms of Listed Potential Problems Will be Absent or Manageable (Overdose, Ingestion/Inhalants)  Outcome: Ongoing (interventions implemented as appropriate)    Problem: Suicide Risk (Adult,Obstetrics,Pediatric)  Goal: Strength-Based Wellness/Recovery  Outcome: Ongoing (interventions implemented as appropriate)  Goal: Physical Safety  Outcome: Ongoing (interventions implemented as appropriate)

## 2017-06-20 NOTE — PROGRESS NOTES
Continued Stay Note  [facility]     Patient Name: Magnus Grande  MRN: [id number]  Today's Date: 6/20/2017    Admit Date: 6/13/2017          Discharge Plan       06/20/17 1014    Case Management/Social Work Plan    Plan Undetermined    Additional Comments OMAR spoke to Rachel Ramsey at [facility] and she states that there will not be a bed available today. Rachel states she will call  back to advise of definite acceptance.               Discharge Codes     None            QUETA Stark

## 2017-06-20 NOTE — PROGRESS NOTES
Continued Stay Note  [facility]     Patient Name: Magnus Grande  MRN: [id number]  Today's Date: 6/20/2017    Admit Date: 6/13/2017          Discharge Plan       06/20/17 1123    Case Management/Social Work Plan    Plan Psych placement    Additional Comments Rachel Ramsey called to request the most recent labs and imaging. OMAR faxed this to Rachel at [phone]. Will continue to follow for definite plans.       06/20/17 1043    Case Management/Social Work Plan    Plan Psych placement    Additional Comments Apparently patient has called around to different assisted living facilities to see about openings. SW spoke to patient in room to explain that he will not be able to admit to an assisted living facility. [facility] has recommended inpatient psych placement (voluntary) and due to this, patient can not admit to a different type of facility. SW does not feel that patient truly understands this. SW did make referral to [facility] in hopes that they will have a bed available since [facility] does not at the moment. Patient agrees.       06/20/17 1014    Case Management/Social Work Plan    Plan Undetermined    Additional Comments OMAR spoke to Rachel Ramsey at [facility] and she states that there will not be a bed available today. Rachel states she will call  back to advise of definite acceptance.               Discharge Codes     None            QUETA Stark

## 2017-06-20 NOTE — DISCHARGE PLACEMENT REQUEST
"Becky Alvarado (57 y.o. Male)     Date of Birth Social Security Number Address Home Phone MRN    1960  [address] [facility] [address] [phone] [id number]    Denominational Marital Status          Unknown        Admission Date Admission Type Admitting Provider Attending Provider Department, Room/Bed    6/13/17 Emergency Davie Carl MD Finney, Patrick C, MD [facility] 3A, 344/1    Discharge Date Discharge Disposition Discharge Destination                      Attending Provider: Davie Carl MD     Allergies:  No Known Allergies    Isolation:  None   Infection:  None   Code Status:  Conditional    Ht:  71\" (180.3 cm)   Wt:  222 lb 1 oz (101 kg)    Admission Cmt:  None   Principal Problem:  None                Active Insurance as of 6/13/2017     Primary Coverage     Payor Plan Insurance Group Employer/Plan Group    MEDICARE MEDICARE A & B      Payor Plan Address Payor Plan Phone Number Effective From Effective To    [address] [phone] 7/1/2010     [place] [address]       Subscriber Name Subscriber Birth Date Member ID       BECKY ALVARADO 1960 [id number]           Secondary Coverage     Payor Plan Insurance Group Employer/Plan Group    [place] [facility]     Payor Plan Address Payor Plan Phone Number Effective From Effective To    [address]  [phone] 6/2/2017     [place] [address]       Subscriber Name Subscriber Birth Date Member ID       BECKY ALVARADO 1960 [id number]                 Emergency Contacts      (Rel.) Home Phone Work Phone Mobile Phone    Neil Alvarado (Father) [phone] -- --            Lab Results (most recent)     Procedure Component Value Units Date/Time    Magnesium [[id number]]  (Abnormal) Collected:  06/13/17 1822    Specimen:  Blood Updated:  06/13/17 1849     Magnesium 2.4 (H) mg/dL     Lipase [[id number]]  (Normal) Collected:  06/13/17 1822    Specimen:  Blood Updated:  06/13/17 1849     " Lipase 51 U/L     Amylase [[id number]]  (Normal) Collected:  06/13/17 1822    Specimen:  Blood Updated:  06/13/17 1849     Amylase 95 U/L     Ethanol [[id number]]  (Normal) Collected:  06/13/17 1822    Specimen:  Blood Updated:  06/13/17 1849     Ethanol % <0.010 %     Narrative:       Not for legal purposes. Chain of Custody not followed.     Protime-INR [[id number]]  (Normal) Collected:  06/13/17 1822    Specimen:  Blood Updated:  06/13/17 1849     Protime 14.5 Seconds      INR 1.09    aPTT [[id number]]  (Normal) Collected:  06/13/17 1822    Specimen:  Blood Updated:  06/13/17 1849     PTT 27.2 seconds     D-dimer, Quantitative [[id number]]  (Abnormal) Collected:  06/13/17 1822    Specimen:  Blood Updated:  06/13/17 1849     D-Dimer, Quantitative 2.22 (H) mg/L (FEU)     Narrative:       Reference Range is 0-0.50 mg/L FEU. However, results <0.50 mg/L FEU tends to rule out DVT or PE. Results >0.50 mg/L FEU are not useful in predicting absence or presence of DVT or PE.    Comprehensive Metabolic Panel [[id number]]  (Abnormal) Collected:  06/13/17 1822    Specimen:  Blood Updated:  06/13/17 1856     Glucose 97 mg/dL      BUN 79 (H) mg/dL      Creatinine 3.41 (H) mg/dL      Sodium 141 mmol/L      Potassium 6.7 (C) mmol/L      Chloride 105 mmol/L      CO2 22.0 (L) mmol/L      Calcium 7.9 (L) mg/dL      Total Protein 7.1 g/dL      Albumin 3.80 g/dL      ALT (SGPT) 49 U/L      AST (SGOT) 61 (H) U/L      Alkaline Phosphatase 83 U/L      Total Bilirubin 0.5 mg/dL      eGFR Non African Amer 19 (L) mL/min/1.73      Globulin 3.3 gm/dL      A/G Ratio 1.2 g/dL      BUN/Creatinine Ratio 23.2     Anion Gap 14.0 (H) mmol/L     BNP [[id number]]  (Abnormal) Collected:  06/13/17 1822    Specimen:  Blood Updated:  06/13/17 1900     proBNP 1580.0 (H) pg/mL     Troponin [[id number]]  (Normal) Collected:  06/13/17 1822    Specimen:  Blood Updated:  06/13/17 1900     Troponin I <0.012 ng/mL     Blood Gas, Arterial [[id number]]  (Abnormal)  Collected:  06/13/17 1913    Specimen:  Arterial Blood Updated:  06/13/17 1918     Site Arterial: left radial     Darvin's Test --      Documented in Rapid Comm        pH, Arterial 7.345 (L) pH units      pCO2, Arterial 38.5 mm Hg      pO2, Arterial 78.0 (L) mm Hg      HCO3, Arterial 20.5 (L) mmol/L      Base Excess, Arterial -4.6 (L) mmol/L      O2 Saturation, Arterial 95.0 %      O2 Saturation Calculated 95.0 %      Barometric Pressure for Blood Gas -- mmHg       Component not reported at this site.        Modality Cannula     Flow Rate 2.00 lpm     Narrative:       Serial Number: [id number]    : [id number]    Lactic Acid, Plasma [[id number]]  (Normal) Collected:  06/13/17 1912    Specimen:  Blood Updated:  06/13/17 1935     Lactate 0.6 mmol/L     Urinalysis With / Culture If Indicated [[id number]]  (Abnormal) Collected:  06/13/17 1931    Specimen:  Urine from Urine, Catheter Updated:  06/13/17 1947     Color, UA Yellow     Appearance, UA Clear     pH, UA <=5.0     Specific Gravity, UA 1.019     Glucose, UA Negative     Ketones, UA Negative     Bilirubin, UA Negative     Blood, UA Negative     Protein, UA Trace (A)     Leuk Esterase, UA Negative     Nitrite, UA Negative     Urobilinogen, UA 0.2 E.U./dL    Narrative:       Urine microscopic not indicated.    Light Blue Top [[id number]] Collected:  06/13/17 1822    Specimen:  Blood Updated:  06/13/17 2001     Extra Tube hold for add-on      Auto resulted       Green Top (Gel) [[id number]] Collected:  06/13/17 1822    Specimen:  Blood Updated:  06/13/17 2001     Extra Tube Hold for add-ons.      Auto resulted.       TSH [[id number]]  (Normal) Collected:  06/13/17 1912    Specimen:  Blood Updated:  06/13/17 2007     TSH 1.330 mIU/mL     CBC & Differential [[id number]] Collected:  06/13/17 1912    Specimen:  Blood Updated:  06/13/17 2016    Narrative:       The following orders were created for panel order CBC & Differential.  Procedure                                Abnormality         Status                     ---------                               -----------         ------                     Scan Slide[[id number]]                                       Final result               CBC Auto Differential[[id number]]        Abnormal            Final result                 Please view results for these tests on the individual orders.    CBC Auto Differential [[id number]]  (Abnormal) Collected:  06/13/17 1912    Specimen:  Blood Updated:  06/13/17 2016     WBC 12.26 (H) 10*3/mm3      RBC 3.57 (L) 10*6/mm3      Hemoglobin 7.2 (L) g/dL      Hematocrit 24.4 (L) %      MCV 68.3 (L) fL      MCH 20.2 (L) pg      MCHC 29.5 (L) g/dL      RDW 21.0 (H) %      RDW-SD 46.4 fl      MPV 9.5 fL      Platelets 277 10*3/mm3      Neutrophil % 78.7 (H) %      Lymphocyte % 12.3 (L) %      Monocyte % 6.8 %      Eosinophil % 1.0 %      Basophil % 0.2 %      Immature Grans % 1.0 %      Neutrophils, Absolute 9.66 (H) 10*3/mm3      Lymphocytes, Absolute 1.51 10*3/mm3      Monocytes, Absolute 0.83 10*3/mm3      Eosinophils, Absolute 0.12 10*3/mm3      Basophils, Absolute 0.02 10*3/mm3      Immature Grans, Absolute 0.12 (H) 10*3/mm3      nRBC 0.0 /100 WBC     Scan Slide [[id number]] Collected:  06/13/17 1912    Specimen:  Blood Updated:  06/13/17 2016     Hypochromia Slight/1+     Microcytes Slight/1+     Poikilocytes Slight/1+     WBC Morphology Normal     Platelet Estimate Adequate    Urine Drug Screen [[id number]]  (Abnormal) Collected:  06/13/17 1932    Specimen:  Urine from Urine, Clean Catch Updated:  06/13/17 2053     Amphetamine Screen, Urine Negative     Barbiturates Screen, Urine Negative     Benzodiazepine Screen, Urine Positive (A)     Cocaine Screen, Urine Negative     Methadone Screen, Urine Negative     Opiate Screen Positive (A)     Phencyclidine (PCP), Urine Negative     THC, Screen, Urine Negative    Narrative:       Negative Thresholds For Drugs Screened in Urine:    Amphetamines          500  ng/ml  Barbiturates          200 ng/ml  Benzodiazepines       200 ng/ml  Cocaine               150 ng/ml  Methadone             150 ng/ml  Opiates               300 ng/ml  Phencyclidine         25 ng/ml  THC                      50 ng/ml    The normal value for all drugs tested is negative. This report includes final unconfirmed screening results.  A positive result by this assay can be, at your request, sent to the Reference Lab for confirmation by gas chromatography. Unconfirmed results must not be used for non-medical purposes, such as employment or legal testing. Clinical consideration should be applied to any drug of abuse test result, particularly when unconfirmed results are used.    Lavender Top [[id number]] Collected:  06/13/17 1912    Specimen:  Blood Updated:  06/13/17 2101     Extra Tube hold for add-on      Auto resulted       [place] Draw [[id number]] Collected:  06/13/17 1822    Specimen:  Blood Updated:  06/13/17 2146    Narrative:       The following orders were created for panel order [place] Draw.  Procedure                               Abnormality         Status                     ---------                               -----------         ------                     Light Blue Top[[id number]]                                   Final result               Green Top (Gel)[[id number]]                                  Final result               Lavender Top[[id number]]                                     Final result               Red Top[[id number]]                                                                     Green Top (No Gel)[[id number]]                                                            Please view results for these tests on the individual orders.    Ammonia [[id number]]  (Abnormal) Collected:  06/13/17 2225    Specimen:  Blood Updated:  06/13/17 2243     Ammonia <9 (L) umol/L     Acetaminophen Level [[id number]]  (Abnormal) Collected:  06/13/17 2350    Specimen:  Blood Updated:  06/14/17  0049     Acetaminophen <10.0 (L) mcg/mL     Salicylate Level [[id number]]  (Abnormal) Collected:  06/13/17 2350    Specimen:  Blood Updated:  06/14/17 0049     Salicylate <1.0 (L) mg/dL     Basic Metabolic Panel [[id number]]  (Abnormal) Collected:  06/14/17 0221    Specimen:  Blood Updated:  06/14/17 0413     Glucose 88 mg/dL      BUN 69 (H) mg/dL      Creatinine 2.37 (H) mg/dL      Sodium 145 mmol/L      Potassium 5.6 (H) mmol/L      Chloride 107 mmol/L      CO2 25.0 mmol/L      Calcium 8.7 mg/dL      eGFR Non African Amer 29 (L) mL/min/1.73      BUN/Creatinine Ratio 29.1 (H)     Anion Gap 13.0 mmol/L     Narrative:       GFR Normal >60  Chronic Kidney Disease <60  Kidney Failure <15  Specimen drawn while blood infusing    CBC (No Diff) [[id number]] Collected:  06/14/17 0221    Specimen:  Blood Updated:  06/14/17 0514     WBC -- 10*3/mm3       Patient receiving blood  Corrected result. Previous result was 10.76 10*3/mm3 on 6/14/2017 at [address] [place] [address]        RBC -- 10*6/mm3       Patient receiving blood  Corrected result. Previous result was 3.69 10*6/mm3 on 6/14/2017 at [address] [place] [address]        Hemoglobin -- g/dL       Patient receiving blood  Corrected result. Previous result was 7.2 g/dL on 6/14/2017 at [address] [place] [address]        Hematocrit -- %       Patient receiving blood  Corrected result. Previous result was 25.5 % on 6/14/2017 at [address] [place] [address]        MCV -- fL       Patient receiving blood  Corrected result. Previous result was 69.1 fL on 6/14/2017 at [address] [place] [address]        MCH -- pg       Patient receiving blood  Corrected result. Previous result was 19.5 pg on 6/14/2017 at [address] [place] [address]        MCHC -- g/dL       Patient receiving blood  Corrected result. Previous result was 28.2 g/dL on 6/14/2017 at [address] [place] [address]        RDW -- %       Corrected result. Previous result was 21.3 % on 6/14/2017 at [address] [place] [address]        RDW-SD -- fl       Corrected result. Previous result was 49.0 fl on 6/14/2017 at 0419 CDT        MPV -- fL       Corrected result. Previous result  was 10.1 fL on 6/14/2017 at 0419 CDT        Platelets -- 10*3/mm3       Patient receiving blood  Corrected result. Previous result was 316 10*3/mm3 on 6/14/2017 at 0419 CDT       Narrative:       Collected during blood transfusion    CBC (No Diff) [[id number]]  (Abnormal) Collected:  06/14/17 0618    Specimen:  Blood Updated:  06/14/17 0639     WBC 9.48 10*3/mm3      RBC 3.78 (L) 10*6/mm3      Hemoglobin 7.8 (L) g/dL      Hematocrit 26.7 (L) %      MCV 70.6 (L) fL      MCH 20.6 (L) pg      MCHC 29.2 (L) g/dL      RDW 22.7 (H) %      RDW-SD 56.4 (H) fl      MPV 9.5 fL      Platelets 253 10*3/mm3     Comprehensive Metabolic Panel [[id number]]  (Abnormal) Collected:  06/15/17 0413    Specimen:  Blood Updated:  06/15/17 0546     Glucose 78 mg/dL      BUN 23 (H) mg/dL      Creatinine 0.83 mg/dL      Sodium 143 mmol/L      Potassium 4.7 mmol/L      Chloride 106 mmol/L      CO2 25.0 mmol/L      Calcium 8.9 mg/dL      Total Protein 7.2 g/dL      Albumin 3.70 g/dL      ALT (SGPT) 50 U/L      AST (SGOT) 52 (H) U/L      Alkaline Phosphatase 79 U/L      Total Bilirubin 0.4 mg/dL      eGFR Non African Amer 96 mL/min/1.73      Globulin 3.5 gm/dL      A/G Ratio 1.1 g/dL      BUN/Creatinine Ratio 27.7 (H)     Anion Gap 12.0 mmol/L     CBC Auto Differential [[id number]]  (Abnormal) Collected:  06/15/17 0412    Specimen:  Blood Updated:  06/15/17 0557     WBC 7.20 10*3/mm3      RBC 3.90 (L) 10*6/mm3      Hemoglobin 8.0 (L) g/dL      Hematocrit 28.0 (L) %      MCV 71.8 (L) fL      MCH 20.5 (L) pg      MCHC 28.6 (L) g/dL      RDW 21.9 (H) %      RDW-SD 56.2 (H) fl      MPV 9.5 fL      Platelets 240 10*3/mm3      Neutrophil % 59.6 %      Lymphocyte % 25.1 %      Monocyte % 10.0 %      Eosinophil % 3.6 %      Basophil % 0.4 %      Immature Grans % 1.3 %      Neutrophils, Absolute 4.29 10*3/mm3      Lymphocytes, Absolute 1.81 10*3/mm3      Monocytes, Absolute 0.72 10*3/mm3      Eosinophils, Absolute 0.26 10*3/mm3      Basophils, Absolute  0.03 10*3/mm3      Immature Grans, Absolute 0.09 (H) 10*3/mm3     CBC & Differential [[id number]] Collected:  06/15/17 0412    Specimen:  Blood Updated:  06/15/17 0557    Narrative:       The following orders were created for panel order CBC & Differential.  Procedure                               Abnormality         Status                     ---------                               -----------         ------                     Scan Slide[[id number]]                                       Final result               CBC Auto Differential[[id number]]        Abnormal            Final result                 Please view results for these tests on the individual orders.    Scan Slide [[id number]] Collected:  06/15/17 0412    Specimen:  Blood Updated:  06/15/17 0557     Anisocytosis Slight/1+     Microcytes Slight/1+     Poikilocytes Slight/1+     WBC Morphology Normal     Platelet Morphology Normal    Hepatitis Panel, Acute [[id number]]  (Normal) Collected:  06/15/17 0412    Specimen:  Blood Updated:  06/15/17 0617     HCV S/C Ratio 0.11     Hepatitis C Ab Negative     Hep A IgM Negative     Hep B C IgM Negative     Hepatitis B Surface Ag Negative    Urine Culture [[id number]]  (Normal) Collected:  06/14/17 0019    Specimen:  Urine from Urine, Clean Catch Updated:  06/16/17 0600     Urine Culture No growth at 2 days    Comprehensive Metabolic Panel [[id number]]  (Abnormal) Collected:  06/16/17 0603    Specimen:  Blood Updated:  06/16/17 0635     Glucose 90 mg/dL      BUN 12 mg/dL      Creatinine 0.85 mg/dL      Sodium 144 mmol/L      Potassium 4.4 mmol/L      Chloride 105 mmol/L      CO2 24.0 mmol/L      Calcium 9.7 mg/dL      Total Protein 8.0 g/dL      Albumin 4.10 g/dL      ALT (SGPT) 39 U/L      AST (SGOT) 53 (H) U/L      Alkaline Phosphatase 87 U/L      Total Bilirubin 0.4 mg/dL      eGFR Non African Amer 94 mL/min/1.73      Globulin 3.9 gm/dL      A/G Ratio 1.1 g/dL      BUN/Creatinine Ratio 14.1     Anion Gap 15.0 (H)  mmol/L     CBC & Differential [[id number]] Collected:  06/16/17 0603    Specimen:  Blood Updated:  06/16/17 0640    Narrative:       The following orders were created for panel order CBC & Differential.  Procedure                               Abnormality         Status                     ---------                               -----------         ------                     Scan Slide[[id number]]                                       Final result               CBC Auto Differential[[id number]]        Abnormal            Final result                 Please view results for these tests on the individual orders.    CBC Auto Differential [[id number]]  (Abnormal) Collected:  06/16/17 0603    Specimen:  Blood Updated:  06/16/17 0640     WBC 8.44 10*3/mm3      RBC 4.39 (L) 10*6/mm3      Hemoglobin 9.1 (L) g/dL      Hematocrit 30.6 (L) %      MCV 69.7 (L) fL      MCH 20.7 (L) pg      MCHC 29.7 (L) g/dL      RDW 22.2 (H) %      RDW-SD 54.8 (H) fl      MPV 9.4 fL      Platelets 236 10*3/mm3      Neutrophil % 50.6 %      Lymphocyte % 31.6 %      Monocyte % 8.9 %      Eosinophil % 3.8 %      Basophil % 0.5 %      Immature Grans % 4.6 %      Neutrophils, Absolute 4.27 10*3/mm3      Lymphocytes, Absolute 2.67 10*3/mm3      Monocytes, Absolute 0.75 10*3/mm3      Eosinophils, Absolute 0.32 10*3/mm3      Basophils, Absolute 0.04 10*3/mm3      Immature Grans, Absolute 0.39 (H) 10*3/mm3     Scan Slide [[id number]] Collected:  06/16/17 0603    Specimen:  Blood Updated:  06/16/17 0640     Anisocytosis Slight/1+     Hypochromia Slight/1+     Poikilocytes Slight/1+     WBC Morphology Normal     Platelet Morphology Normal    Comprehensive Metabolic Panel [[id number]]  (Abnormal) Collected:  06/17/17 0622    Specimen:  Blood Updated:  06/17/17 0643     Glucose 113 (H) mg/dL      BUN 16 mg/dL      Creatinine 0.85 mg/dL      Sodium 142 mmol/L      Potassium 4.0 mmol/L      Chloride 103 mmol/L      CO2 22.0 (L) mmol/L      Calcium 9.0 mg/dL       Total Protein 8.2 g/dL      Albumin 4.30 g/dL      ALT (SGPT) 42 U/L      AST (SGOT) 61 (H) U/L      Alkaline Phosphatase 84 U/L      Total Bilirubin 0.5 mg/dL      eGFR Non African Amer 94 mL/min/1.73      Globulin 3.9 gm/dL      A/G Ratio 1.1 g/dL      BUN/Creatinine Ratio 18.8     Anion Gap 17.0 (H) mmol/L     CBC & Differential [[id number]] Collected:  06/17/17 0622    Specimen:  Blood Updated:  06/17/17 0702    Narrative:       The following orders were created for panel order CBC & Differential.  Procedure                               Abnormality         Status                     ---------                               -----------         ------                     Manual Differential[[id number]]          Abnormal            Final result               Scan Slide[[id number]]                                       Final result               CBC Auto Differential[[id number]]        Abnormal            Final result                 Please view results for these tests on the individual orders.    CBC Auto Differential [[id number]]  (Abnormal) Collected:  06/17/17 0622    Specimen:  Blood Updated:  06/17/17 0702     WBC 8.90 10*3/mm3      RBC 4.54 (L) 10*6/mm3      Hemoglobin 9.4 (L) g/dL      Hematocrit 31.7 (L) %      MCV 69.8 (L) fL      MCH 20.7 (L) pg      MCHC 29.7 (L) g/dL      RDW 22.5 (H) %      RDW-SD 55.6 (H) fl      MPV 9.3 fL      Platelets 261 10*3/mm3     Scan Slide [[id number]] Collected:  06/17/17 0622    Specimen:  Blood Updated:  06/17/17 0702     Scan Slide --      See Manual Differential Results       Manual Differential [[id number]]  (Abnormal) Collected:  06/17/17 0622    Specimen:  Blood Updated:  06/17/17 0702     Neutrophil % 68.0 %      Lymphocyte % 21.0 %      Monocyte % 5.0 %      Eosinophil % 5.0 (H) %      Basophil % 1.0 %      Neutrophils Absolute 6.05 10*3/mm3      Lymphocytes Absolute 1.87 10*3/mm3      Monocytes Absolute 0.45 10*3/mm3      Eosinophils Absolute 0.45 10*3/mm3      Basophils  Absolute 0.09 10*3/mm3      Microcytes Slight/1+     Ovalocytes Slight/1+     Poikilocytes Slight/1+     WBC Morphology Normal     Platelet Estimate Adequate    Basic Metabolic Panel [[id number]]  (Abnormal) Collected:  06/18/17 0422    Specimen:  Blood Updated:  06/18/17 0521     Glucose 91 mg/dL      BUN 15 mg/dL      Creatinine 0.90 mg/dL      Sodium 144 mmol/L      Potassium 4.3 mmol/L      Chloride 105 mmol/L      CO2 25.0 mmol/L      Calcium 9.5 mg/dL      eGFR Non African Amer 88 mL/min/1.73      BUN/Creatinine Ratio 16.7     Anion Gap 14.0 (H) mmol/L     Narrative:       GFR Normal >60  Chronic Kidney Disease <60  Kidney Failure <15    CBC & Differential [[id number]] Collected:  06/18/17 0525    Specimen:  Blood Updated:  06/18/17 0629    Narrative:       The following orders were created for panel order CBC & Differential.  Procedure                               Abnormality         Status                     ---------                               -----------         ------                     Scan Slide[[id number]]                                       Final result               CBC Auto Differential[[id number]]        Abnormal            Final result                 Please view results for these tests on the individual orders.    CBC Auto Differential [[id number]]  (Abnormal) Collected:  06/18/17 0525    Specimen:  Blood Updated:  06/18/17 0629     WBC 10.16 10*3/mm3      RBC 4.68 (L) 10*6/mm3      Hemoglobin 9.8 (L) g/dL      Hematocrit 33.0 (L) %      MCV 70.5 (L) fL      MCH 20.9 (L) pg      MCHC 29.7 (L) g/dL      RDW 22.7 (H) %      RDW-SD 56.8 (H) fl      MPV 9.8 fL      Platelets 281 10*3/mm3      Neutrophil % 55.7 %      Lymphocyte % 29.8 %      Monocyte % 8.2 %      Eosinophil % 3.4 %      Basophil % 0.3 %      Immature Grans % 2.6 %      Neutrophils, Absolute 5.66 10*3/mm3      Lymphocytes, Absolute 3.03 10*3/mm3      Monocytes, Absolute 0.83 10*3/mm3      Eosinophils, Absolute 0.35 10*3/mm3       Basophils, Absolute 0.03 10*3/mm3      Immature Grans, Absolute 0.26 (H) 10*3/mm3      nRBC 0.0 /100 WBC     Scan Slide [[id number]] Collected:  06/18/17 0525    Specimen:  Blood Updated:  06/18/17 0629     Anisocytosis Slight/1+     Microcytes Mod/2+     WBC Morphology Normal     Platelet Morphology Normal    Basic Metabolic Panel [[id number]]  (Normal) Collected:  06/19/17 0525    Specimen:  Blood Updated:  06/19/17 0608     Glucose 100 mg/dL      BUN 16 mg/dL      Creatinine 0.87 mg/dL      Sodium 142 mmol/L      Potassium 3.9 mmol/L      Chloride 104 mmol/L      CO2 25.0 mmol/L      Calcium 9.0 mg/dL      eGFR Non African Amer 91 mL/min/1.73      BUN/Creatinine Ratio 18.4     Anion Gap 13.0 mmol/L     Narrative:       GFR Normal >60  Chronic Kidney Disease <60  Kidney Failure <15    CBC & Differential [[id number]] Collected:  06/19/17 0540    Specimen:  Blood Updated:  06/19/17 0730    Narrative:       The following orders were created for panel order CBC & Differential.  Procedure                               Abnormality         Status                     ---------                               -----------         ------                     Scan Slide[[id number]]                                       Final result               CBC Auto Differential[[id number]]        Abnormal            Final result                 Please view results for these tests on the individual orders.    CBC Auto Differential [[id number]]  (Abnormal) Collected:  06/19/17 0540    Specimen:  Blood Updated:  06/19/17 0730     WBC 7.36 10*3/mm3      RBC 4.57 (L) 10*6/mm3      Hemoglobin 9.5 (L) g/dL      Hematocrit 32.7 (L) %      MCV 71.6 (L) fL      MCH 20.8 (L) pg      MCHC 29.1 (L) g/dL      RDW 23.1 (H) %      RDW-SD 58.8 (H) fl      MPV 10.1 fL      Platelets 298 10*3/mm3      Neutrophil % 51.9 %      Lymphocyte % 34.2 %      Monocyte % 9.1 %      Eosinophil % 3.4 %      Basophil % 0.3 %      Immature Grans % 1.1 %      Neutrophils,  Absolute 3.82 10*3/mm3      Lymphocytes, Absolute 2.52 10*3/mm3      Monocytes, Absolute 0.67 10*3/mm3      Eosinophils, Absolute 0.25 10*3/mm3      Basophils, Absolute 0.02 10*3/mm3      Immature Grans, Absolute 0.08 (H) 10*3/mm3      nRBC 0.0 /100 WBC     Scan Slide [[id number]] Collected:  06/19/17 0540    Specimen:  Blood Updated:  06/19/17 0730     Anisocytosis Mod/2+     Hypochromia Slight/1+     Microcytes Slight/1+     Poikilocytes Slight/1+     Polychromasia Slight/1+     Stomatocytes Slight/1+     WBC Morphology Normal     Platelet Morphology Normal          Imaging Results (most recent)     Procedure Component Value Units Date/Time    XR Chest 1 View [[id number]] Collected:  06/13/17 1822     Updated:  06/13/17 2100    Narrative:       EXAMINATION:   XR CHEST 1 VW-  6/13/2017 6:12 PM CDT     HISTORY: Hypoxia.     A single view of the chest is obtained. Chronic change is present in the  pulmonary parenchyma, similar to 09/19/2015. The cardiac silhouette is  mildly enlarged.       Impression:       Chronic interstitial change in the pulmonary parenchyma is  present. Subtle superimposed interstitial edema cannot be excluded.  This report was finalized on 06/13/2017 20:57 by Dr. Mode Willams MD.    CT Head Without Contrast [[id number]] Collected:  06/13/17 2114     Updated:  06/13/17 2158    Narrative:       CT BRAIN WITHOUT CONTRAST 6/13/2017 8:55 PM CDT     HISTORY: Altered mental status.     COMPARISON: 09/16/2015      DLP: 811 mGy cm     TECHNIQUE: Serial axial tomographic images of the brain were obtained  without the use of intravenous contrast.      FINDINGS:   The midline structures are nondisplaced. There is mild cerebral and  cerebellar volume loss, with an associated increase in the prominence of  the ventricles and sulci. The basilar cisterns are normal in size and  configuration. There is no evidence of intracranial hemorrhage or  mass-effect.     . There are no abnormal extra-axial fluid  collections. There is no  evidence of tonsillar herniation.      The included orbits and their contents are unremarkable. The visualized  paranasal sinuses, mastoid air cells and middle ear cavities are clear.  The visualized osseous structures and overlying soft tissues of the  skull and face are intact.        Impression:       No acute intracranial abnormality is identified. Mild cortical volume  loss is noted.        This report was finalized on 06/13/2017 21:55 by Dr. Mode Willams MD.    NM Lung Ventilation Perfusion [[id number]] Collected:  06/14/17 0733     Updated:  06/14/17 0737    Narrative:       EXAMINATION: NM LUNG VENTILATION PERFUSION- 6/14/2017 7:33 AM CDT     HISTORY: Elevated d-dimer.     Dose:  1. 11.3 mCi xenon-133 via inhalation.  2. 5.5 mCi technetium 99m MAA intravenously.     REPORT: Comparison is made with the chest x-ray of 06/13/2017, with no  focal infiltrates demonstrated. The ventilation images show normal  distribution of xenon within the lungs. The perfusion images show normal  homogeneous distribution of technetium within the lungs, there are no  filling defects to suggest significant pulmonary emboli.       Impression:       Low probability ventilation/perfusion lung scan for  significant pulmonary emboli.     A preliminary report was provided by the [facility] radiology service.     This report was finalized on 06/14/2017 07:34 by Dr. Neil Modi MD.    CT Cervical Spine Without Contrast [[id number]] Collected:  06/14/17 0738     Updated:  06/14/17 0744    Narrative:       EXAMINATION: CT CERVICAL SPINE WO CONTRAST- 6/14/2017 7:38 AM CDT     HISTORY: Neck pain, acute encephalopathy.     DOSE: 633 mGycm (Automatic exposure control technique was implemented in  an effort to keep the radiation dose as low as possible without  compromising image quality)     Report: Multiple axial images of the cervical spine were obtained  without contrast, the examination includes reformatted sagittal  and  coronal images. There are no comparison studies.     Alignment is normal except for slight retrolisthesis of C5 relative to  C6, this is compatible with grade 1 spondylolisthesis. There is moderate  disc space narrowing, endplate spurring and sclerosis at C5-6. No  fracture is identified. The prevertebral soft tissues have normal  thickness. There is mild central canal stenosis at C5-6. There is  moderate facet degeneration on the left C2-3, C3-4 and C4-5. There is  mild bilateral neural foraminal narrowing at C5-6 related to  osteophytes.       Impression:       Impression: No acute cervical spine abnormality. Chronic degenerative  disc disease at C5-6.     A preliminary report was provided by the [facility] radiology service.           This report was finalized on 06/14/2017 07:41 by Dr. Neil Modi MD.          ECG/EMG Results (most recent)     Procedure Component Value Units Date/Time    ECG 12 Lead [[id number]] Collected:  06/13/17 1710     Updated:  06/14/17 2138    Narrative:       Test Reason : Overdose  Blood Pressure : **/** mmHG  Vent. Rate : 105 BPM     Atrial Rate : 105 BPM     P-R Int : 188 ms          QRS Dur : 096 ms      QT Int : 332 ms       P-R-T Axes : 025 -22 048 degrees     QTc Int : 438 ms    Sinus tachycardia with occasional Premature ventricular complexes  Otherwise normal ECG  When compared with ECG of 23-OCT-2014 18:20,  Premature ventricular complexes are now Present  Borderline criteria for Lateral infarct are no longer Present    Referred By:  ESE           Confirmed By:Augustine Black MD

## 2017-06-20 NOTE — PROGRESS NOTES
[facility] Medicine Services  INPATIENT PROGRESS NOTE    Length of Stay: 7  Date of Admission: 6/13/2017  Primary Care Physician: No Known Provider    Subjective   Chief Complaint: Suicidal thought    Drug Overdose   Pertinent negatives include no abdominal pain, arthralgias, chest pain, chills, coughing, fever, headaches, joint swelling, myalgias, nausea, rash, vomiting or weakness.      Suicidal thoughts.  [facility] for [facility] be admitted at [facility] behavioral health for suicidal thoughts onToday. Denies of any chest pain or sob. At this time they still don't have any beds.  Patient is calm no tremors.    Review of Systems   Constitutional: Negative for activity change, appetite change, chills and fever.   HENT: Negative for hearing loss, nosebleeds, tinnitus and trouble swallowing.    Eyes: Negative for visual disturbance.   Respiratory: Negative for cough, chest tightness, shortness of breath and wheezing.    Cardiovascular: Negative for chest pain, palpitations and leg swelling.   Gastrointestinal: Negative for abdominal distention, abdominal pain, blood in stool, constipation, diarrhea, nausea and vomiting.   Endocrine: Negative for cold intolerance, heat intolerance, polydipsia, polyphagia and polyuria.   Genitourinary: Negative for decreased urine volume, difficulty urinating, dysuria, flank pain, frequency and hematuria.   Musculoskeletal: Negative for arthralgias, joint swelling and myalgias.   Skin: Negative for rash.   Allergic/Immunologic: Negative for immunocompromised state.   Neurological: Negative for dizziness, syncope, weakness, light-headedness and headaches.   Hematological: Negative for adenopathy. Does not bruise/bleed easily.   Psychiatric/Behavioral: Negative for confusion and sleep disturbance. The patient is not nervous/anxious.           All pertinent negatives and positives  are as above. All other systems have been reviewed and are negative unless otherwise stated.     Objective    Temp:  [97.6 °F (36.4 °C)-98.4 °F (36.9 °C)] 97.7 °F (36.5 °C)  Heart Rate:  [75-90] 80  Resp:  [16-18] 16  BP: (122-150)/(71-97) 128/71    Intake/Output Summary (Last 24 hours) at 06/20/17 1551  Last data filed at 06/20/17 1246   Gross per 24 hour   Intake             3476 ml   Output                0 ml   Net             3476 ml     Physical Exam   Constitutional: He is oriented to person, place, and time. He appears well-developed and well-nourished.   HENT:   Head: Normocephalic and atraumatic.   Eyes: Conjunctivae and EOM are normal. Pupils are equal, round, and reactive to light.   Neck: Neck supple. No JVD present. No thyromegaly present.   Cardiovascular: Normal rate, regular rhythm, normal heart sounds and intact distal pulses.  Exam reveals no gallop and no friction rub.    No murmur heard.  Pulmonary/Chest: Effort normal and breath sounds normal. No respiratory distress. He has no wheezes. He has no rales. He exhibits no tenderness.   Abdominal: Soft. Bowel sounds are normal. He exhibits no distension. There is no tenderness. There is no rebound and no guarding.   Musculoskeletal: Normal range of motion. He exhibits no edema, tenderness or deformity.   Lymphadenopathy:     He has no cervical adenopathy.   Neurological: He is alert and oriented to person, place, and time. He displays normal reflexes. No cranial nerve deficit. He exhibits normal muscle tone.   Skin: Skin is warm and dry. No rash noted.   Psychiatric: He has a normal mood and affect. His behavior is normal. Judgment and thought content normal.       Results Review:  Lab Results (last 24 hours)     ** No results found for the last 24 hours. **           Cultures:  Urine Culture   Date Value Ref Range Status   06/14/2017 No growth at 2 days  Final       Radiology Data:    Imaging Results (last 24 hours)     ** No results found for the  last 24 hours. **          No Known Allergies    Scheduled meds:     aluminum-magnesium hydroxide-simethicone 10 mL Oral Q6H   chlordiazePOXIDE 50 mg Oral Q6H   famotidine 20 mg Intravenous BID   FLUoxetine 20 mg Oral Daily   gabapentin 300 mg Oral Q8H   IV Fluids 1000 mL + additives 125 mL/hr Intravenous Daily   nicotine 1 patch Transdermal Daily   pantoprazole 40 mg Oral Q AM   QUEtiapine 300 mg Oral Nightly   traMADol 50 mg Oral BID       PRN meds:  HYDROcodone-acetaminophen  •  ondansetron    Assessment/Plan     Active Problems:    Acute renal failure    Altered mental status      Plan:  Altered mental status/drug overdose- Ct head, no acute change. Tylenol and salicylate levels - low, ammonia level and lactic acid level- low. Patient threating suicidal. . Suicidal watch for now.       renal failure - resolved      Hyperkalemia- resolved       Chronic pain-put patient back as Ultram and decrease the dose of Norco. Continue Neurontin.      Anemia- S/P transfuse 1 unit of PRBCs. Stable.   Elevated D-dimer. V/Q, low probability.      Alcohol abuse- start Librium    Anxiety- Prozac.      Diet regular      Discharge Planning: Plan to [facility] to [facility] for suicidal thoughts when bed available..Still no bed  Available at this time.  Transfer when able.    Davie Carl MD   06/20/17   3:51 PM

## 2017-06-21 LAB
ANION GAP SERPL CALCULATED.3IONS-SCNC: 15 MMOL/L (ref 4–13)
BASOPHILS # BLD AUTO: 0.02 10*3/MM3 (ref 0–0.2)
BASOPHILS NFR BLD AUTO: 0.3 % (ref 0–2)
BUN BLD-MCNC: 21 MG/DL (ref 5–21)
BUN/CREAT SERPL: 22.1 (ref 7–25)
CALCIUM SPEC-SCNC: 9.4 MG/DL (ref 8.4–10.4)
CHLORIDE SERPL-SCNC: 101 MMOL/L (ref 98–110)
CO2 SERPL-SCNC: 26 MMOL/L (ref 24–31)
CREAT BLD-MCNC: 0.95 MG/DL (ref 0.5–1.4)
DEPRECATED RDW RBC AUTO: 57.8 FL (ref 40–54)
EOSINOPHIL # BLD AUTO: 0.28 10*3/MM3 (ref 0–0.7)
EOSINOPHIL NFR BLD AUTO: 3.6 % (ref 0–4)
ERYTHROCYTE [DISTWIDTH] IN BLOOD BY AUTOMATED COUNT: 22.6 % (ref 12–15)
GFR SERPL CREATININE-BSD FRML MDRD: 82 ML/MIN/1.73
GLUCOSE BLD-MCNC: 95 MG/DL (ref 70–100)
HCT VFR BLD AUTO: 32.8 % (ref 40–52)
HGB BLD-MCNC: 9.5 G/DL (ref 14–18)
IMM GRANULOCYTES # BLD: 0.08 10*3/MM3 (ref 0–0.03)
IMM GRANULOCYTES NFR BLD: 1 % (ref 0–5)
LYMPHOCYTES # BLD AUTO: 2.51 10*3/MM3 (ref 0.72–4.86)
LYMPHOCYTES NFR BLD AUTO: 32.4 % (ref 15–45)
MCH RBC QN AUTO: 20.6 PG (ref 28–32)
MCHC RBC AUTO-ENTMCNC: 29 G/DL (ref 33–36)
MCV RBC AUTO: 71 FL (ref 82–95)
MICROCYTES BLD QL: NORMAL
MONOCYTES # BLD AUTO: 0.42 10*3/MM3 (ref 0.19–1.3)
MONOCYTES NFR BLD AUTO: 5.4 % (ref 4–12)
NEUTROPHILS # BLD AUTO: 4.44 10*3/MM3 (ref 1.87–8.4)
NEUTROPHILS NFR BLD AUTO: 57.3 % (ref 39–78)
PLATELET # BLD AUTO: 446 10*3/MM3 (ref 130–400)
PMV BLD AUTO: 9.9 FL (ref 6–12)
POTASSIUM BLD-SCNC: 4.6 MMOL/L (ref 3.5–5.3)
RBC # BLD AUTO: 4.62 10*6/MM3 (ref 4.8–5.9)
SMALL PLATELETS BLD QL SMEAR: NORMAL
SODIUM BLD-SCNC: 142 MMOL/L (ref 135–145)
WBC MORPH BLD: NORMAL
WBC NRBC COR # BLD: 7.75 10*3/MM3 (ref 4.8–10.8)

## 2017-06-21 PROCEDURE — 85007 BL SMEAR W/DIFF WBC COUNT: CPT | Performed by: INTERNAL MEDICINE

## 2017-06-21 PROCEDURE — 80048 BASIC METABOLIC PNL TOTAL CA: CPT | Performed by: INTERNAL MEDICINE

## 2017-06-21 PROCEDURE — 85025 COMPLETE CBC W/AUTO DIFF WBC: CPT | Performed by: INTERNAL MEDICINE

## 2017-06-21 RX ADMIN — TRAMADOL HYDROCHLORIDE 50 MG: 50 TABLET, COATED ORAL at 17:54

## 2017-06-21 RX ADMIN — GABAPENTIN 300 MG: 300 CAPSULE ORAL at 05:40

## 2017-06-21 RX ADMIN — GABAPENTIN 300 MG: 300 CAPSULE ORAL at 14:05

## 2017-06-21 RX ADMIN — FAMOTIDINE 20 MG: 10 INJECTION, SOLUTION INTRAVENOUS at 08:17

## 2017-06-21 RX ADMIN — CHLORDIAZEPOXIDE HYDROCHLORIDE 50 MG: 25 CAPSULE ORAL at 05:40

## 2017-06-21 RX ADMIN — HYDROCODONE BITARTRATE AND ACETAMINOPHEN 1 TABLET: 7.5; 325 TABLET ORAL at 20:10

## 2017-06-21 RX ADMIN — PANTOPRAZOLE SODIUM 40 MG: 40 TABLET, DELAYED RELEASE ORAL at 05:40

## 2017-06-21 RX ADMIN — ALUMINUM HYDROXIDE, MAGNESIUM HYDROXIDE, AND DIMETHICONE 10 ML: 400; 400; 40 SUSPENSION ORAL at 04:10

## 2017-06-21 RX ADMIN — HYDROCODONE BITARTRATE AND ACETAMINOPHEN 1 TABLET: 7.5; 325 TABLET ORAL at 08:16

## 2017-06-21 RX ADMIN — ALUMINUM HYDROXIDE, MAGNESIUM HYDROXIDE, AND DIMETHICONE 10 ML: 400; 400; 40 SUSPENSION ORAL at 11:13

## 2017-06-21 RX ADMIN — CHLORDIAZEPOXIDE HYDROCHLORIDE 50 MG: 25 CAPSULE ORAL at 00:03

## 2017-06-21 RX ADMIN — CHLORDIAZEPOXIDE HYDROCHLORIDE 50 MG: 25 CAPSULE ORAL at 17:54

## 2017-06-21 RX ADMIN — HYDROCODONE BITARTRATE AND ACETAMINOPHEN 1 TABLET: 7.5; 325 TABLET ORAL at 14:05

## 2017-06-21 RX ADMIN — FAMOTIDINE 20 MG: 10 INJECTION, SOLUTION INTRAVENOUS at 17:55

## 2017-06-21 RX ADMIN — CHLORDIAZEPOXIDE HYDROCHLORIDE 50 MG: 25 CAPSULE ORAL at 11:13

## 2017-06-21 RX ADMIN — HYDROCODONE BITARTRATE AND ACETAMINOPHEN 1 TABLET: 7.5; 325 TABLET ORAL at 01:59

## 2017-06-21 RX ADMIN — TRAMADOL HYDROCHLORIDE 50 MG: 50 TABLET, COATED ORAL at 08:23

## 2017-06-21 RX ADMIN — ALUMINUM HYDROXIDE, MAGNESIUM HYDROXIDE, AND DIMETHICONE 10 ML: 400; 400; 40 SUSPENSION ORAL at 16:15

## 2017-06-21 RX ADMIN — FLUOXETINE HYDROCHLORIDE 20 MG: 20 CAPSULE ORAL at 08:16

## 2017-06-21 RX ADMIN — NICOTINE 1 PATCH: 21 PATCH, EXTENDED RELEASE TRANSDERMAL at 09:12

## 2017-06-21 RX ADMIN — QUETIAPINE FUMARATE 300 MG: 200 TABLET, FILM COATED ORAL at 20:11

## 2017-06-21 NOTE — PROGRESS NOTES
[facility] Medicine Services  INPATIENT PROGRESS NOTE    Length of Stay: 8  Date of Admission: 6/13/2017  Primary Care Physician: No Known Provider    Subjective   Chief Complaint: Suicidal thought    Drug Overdose   Pertinent negatives include no abdominal pain, arthralgias, chest pain, chills, coughing, fever, headaches, joint swelling, myalgias, nausea, rash, vomiting or weakness.      Suicidal thoughts.  [facility]. [facility] arrange for patient be admitted at [facility] behavioral health for suicidal thoughts onToday. Denies of any chest pain or sob. At this time they still don't have any beds.  Patient is calm no tremors.    Review of Systems   Constitutional: Negative for activity change, appetite change, chills and fever.   HENT: Negative for hearing loss, nosebleeds, tinnitus and trouble swallowing.    Eyes: Negative for visual disturbance.   Respiratory: Negative for cough, chest tightness, shortness of breath and wheezing.    Cardiovascular: Negative for chest pain, palpitations and leg swelling.   Gastrointestinal: Negative for abdominal distention, abdominal pain, blood in stool, constipation, diarrhea, nausea and vomiting.   Endocrine: Negative for cold intolerance, heat intolerance, polydipsia, polyphagia and polyuria.   Genitourinary: Negative for decreased urine volume, difficulty urinating, dysuria, flank pain, frequency and hematuria.   Musculoskeletal: Negative for arthralgias, joint swelling and myalgias.   Skin: Negative for rash.   Allergic/Immunologic: Negative for immunocompromised state.   Neurological: Negative for dizziness, syncope, weakness, light-headedness and headaches.   Hematological: Negative for adenopathy. Does not bruise/bleed easily.   Psychiatric/Behavioral: Negative for confusion and sleep disturbance. The patient is not nervous/anxious.           All pertinent negatives and positives  are as above. All other systems have been reviewed and are negative unless otherwise stated.     Objective    Temp:  [97.7 °F (36.5 °C)-98.1 °F (36.7 °C)] 98 °F (36.7 °C)  Heart Rate:  [71-83] 74  Resp:  [16-18] 18  BP: (107-143)/(66-89) 134/82    Intake/Output Summary (Last 24 hours) at 06/21/17 0968  Last data filed at 06/21/17 0743   Gross per 24 hour   Intake             3475 ml   Output                0 ml   Net             3475 ml     Physical Exam   Constitutional: He is oriented to person, place, and time. He appears well-developed and well-nourished.   HENT:   Head: Normocephalic and atraumatic.   Eyes: Conjunctivae and EOM are normal. Pupils are equal, round, and reactive to light.   Neck: Neck supple. No JVD present. No thyromegaly present.   Cardiovascular: Normal rate, regular rhythm, normal heart sounds and intact distal pulses.  Exam reveals no gallop and no friction rub.    No murmur heard.  Pulmonary/Chest: Effort normal and breath sounds normal. No respiratory distress. He has no wheezes. He has no rales. He exhibits no tenderness.   Abdominal: Soft. Bowel sounds are normal. He exhibits no distension. There is no tenderness. There is no rebound and no guarding.   Musculoskeletal: Normal range of motion. He exhibits no edema, tenderness or deformity.   Lymphadenopathy:     He has no cervical adenopathy.   Neurological: He is alert and oriented to person, place, and time. He displays normal reflexes. No cranial nerve deficit. He exhibits normal muscle tone.   Skin: Skin is warm and dry. No rash noted.   Psychiatric: He has a normal mood and affect. His behavior is normal. Judgment and thought content normal.       Results Review:  Lab Results (last 24 hours)     Procedure Component Value Units Date/Time    Basic Metabolic Panel [[id number]]  (Abnormal) Collected:  06/21/17 0534    Specimen:  Blood Updated:  06/21/17 0607     Glucose 95 mg/dL      BUN 21 mg/dL      Creatinine 0.95 mg/dL      Sodium 142  mmol/L      Potassium 4.6 mmol/L      Chloride 101 mmol/L      CO2 26.0 mmol/L      Calcium 9.4 mg/dL      eGFR Non African Amer 82 mL/min/1.73      BUN/Creatinine Ratio 22.1     Anion Gap 15.0 (H) mmol/L     Narrative:       GFR Normal >60  Chronic Kidney Disease <60  Kidney Failure <15    CBC & Differential [[id number]] Collected:  06/21/17 0534    Specimen:  Blood Updated:  06/21/17 0624    Narrative:       The following orders were created for panel order CBC & Differential.  Procedure                               Abnormality         Status                     ---------                               -----------         ------                     Scan Slide[[id number]]                                       Final result               CBC Auto Differential[[id number]]        Abnormal            Final result                 Please view results for these tests on the individual orders.    CBC Auto Differential [[id number]]  (Abnormal) Collected:  06/21/17 0534    Specimen:  Blood Updated:  06/21/17 0624     WBC 7.75 10*3/mm3      RBC 4.62 (L) 10*6/mm3      Hemoglobin 9.5 (L) g/dL      Hematocrit 32.8 (L) %      MCV 71.0 (L) fL      MCH 20.6 (L) pg      MCHC 29.0 (L) g/dL      RDW 22.6 (H) %      RDW-SD 57.8 (H) fl      MPV 9.9 fL      Platelets 446 (H) 10*3/mm3      Neutrophil % 57.3 %      Lymphocyte % 32.4 %      Monocyte % 5.4 %      Eosinophil % 3.6 %      Basophil % 0.3 %      Immature Grans % 1.0 %      Neutrophils, Absolute 4.44 10*3/mm3      Lymphocytes, Absolute 2.51 10*3/mm3      Monocytes, Absolute 0.42 10*3/mm3      Eosinophils, Absolute 0.28 10*3/mm3      Basophils, Absolute 0.02 10*3/mm3      Immature Grans, Absolute 0.08 (H) 10*3/mm3     Narrative:       Appended report.  These results have been appended to a previously verified report.    Scan Slide [[id number]] Collected:  06/21/17 0534    Specimen:  Blood Updated:  06/21/17 0624     Microcytes Mod/2+     WBC Morphology Normal     Platelet Estimate  Increased           Cultures:  Urine Culture   Date Value Ref Range Status   06/14/2017 No growth at 2 days  Final       Radiology Data:    Imaging Results (last 24 hours)     ** No results found for the last 24 hours. **          No Known Allergies    Scheduled meds:     aluminum-magnesium hydroxide-simethicone 10 mL Oral Q6H   chlordiazePOXIDE 50 mg Oral Q6H   famotidine 20 mg Intravenous BID   FLUoxetine 20 mg Oral Daily   gabapentin 300 mg Oral Q8H   IV Fluids 1000 mL + additives 125 mL/hr Intravenous Daily   nicotine 1 patch Transdermal Daily   pantoprazole 40 mg Oral Q AM   QUEtiapine 300 mg Oral Nightly   traMADol 50 mg Oral BID       PRN meds:  HYDROcodone-acetaminophen  •  ondansetron    Assessment/Plan     Active Problems:    Acute renal failure    Altered mental status      Plan:  Altered mental status/drug overdose- Ct head, no acute change. Tylenol and salicylate levels - low, ammonia level and lactic acid level- low. Patient threating suicidal. . Suicidal watch for now.       renal failure - resolved      Hyperkalemia- resolved       Chronic pain-put patient back as Ultram and decrease the dose of Norco. Continue Neurontin.      Anemia- S/P transfuse 1 unit of PRBCs. Stable.   Elevated D-dimer. V/Q, low probability.      Alcohol abuse- continue Librium    Anxiety- Prozac.      Diet regular      Discharge Planning: Plan to transfer patient to [facility] behavioral health for suicidal thoughts when bed available or to [facility]. Still no bed  Available at this time.  Transfer when able.    Davie Carl MD   06/21/17   9:25 AM

## 2017-06-21 NOTE — PROGRESS NOTES
Continued Stay Note  [facility]     Patient Name: Magnus Grande  MRN: [id number]  Today's Date: 6/21/2017    Admit Date: 6/13/2017          Discharge Plan       06/21/17 1444    Case Management/Social Work Plan    Plan Psych placement    Additional Comments OMAR spoke to Madai in admissions at [facility]. Madai confirmed she will contact Dr. Obrien now for status of referral and will call OMAR back.       06/21/17 1250    Case Management/Social Work Plan    Plan Psych placement    Additional Comments OMAR left a message for [facility] admissions ([facility]) [phone]. Will follow for return phone call re status of referral.               Discharge Codes     None            QUETA Stark

## 2017-06-21 NOTE — DISCHARGE PLACEMENT REQUEST
"UPDATES      Becky Alvarado (57 y.o. Male)     Date of Birth Social Security Number Address Home Phone MRN    1960  [address] [facility] [address] [phone] [id number]    [employer] Mar[employer] Status          Unknown        Admission Date Admission Type Admitting Provider Attending Provider Department, Room/Bed    6/13/17 Emergency Davie Carl MD Finney, Patrick C, MD [facility] 3A, 344/1    Discharge Date Discharge Disposition Discharge Destination                      Attending Provider: Davie Carl MD     Allergies:  No Known Allergies    Isolation:  None   Infection:  None   Code Status:  Conditional    Ht:  71\" (180.3 cm)   Wt:  222 lb (101 kg)    Admission Cmt:  None   Principal Problem:  None                Active Insurance as of 6/13/2017     Primary Coverage     Payor Plan Insurance Group Employer/Plan Group    MEDICARE MEDICARE A & B      Payor Plan Address Payor Plan Phone Number Effective From Effective To    [address] [phone] 7/1/2010     [place] [address]       Subscriber Name Subscriber Birth Date Member ID       BECKY ALVARADO 1960 [id number]           Secondary Coverage     Payor Plan Insurance Group Employer/Plan Group    HU[employer] MEDICAID HU[employer]NA [employer] [facility]     Payor Plan Address Payor Plan Phone Number Effective From Effective To    PO  [phone] 6/2/2017     [place] [address]       Subscriber Name Subscriber Birth Date Member ID       BECKY ALVARADO 1960 [id number]                 Emergency Contacts      (Rel.) Home Phone Work Phone Mobile Phone    Neil Alvarado (Father) [phone] -- --            Vital Signs (last 24 hours)       06/20 0700  -  06/21 0659 06/21 0700  -  06/21 1604   Most Recent    Temp (°F) 97.7 -  98.1    98 -  98.2     98.2 (36.8)    Heart Rate 71 -  83    74 -  75     75    Resp 16 -  18    18 -  20     20    /66 -  143/89    128/82 -  134/82     128/82    SpO2 (%) 93 -  " 98    93 -  95     93          Prior to Admission Medications     Prescriptions Last Dose Informant Patient Reported? Taking?    ALPRAZolam (XANAX) 1 MG tablet 6/13/2017 Self Yes Yes    Take 1 mg by mouth 3 (Three) Times a Day.    diclofenac (VOLTAREN) 75 MG EC tablet 6/13/2017 Self No Yes    Take 1 tablet by mouth 2 (Two) Times a Day As Needed (pain).    gabapentin (NEURONTIN) 300 MG capsule 6/13/2017 Self No Yes    Take 1 capsule by mouth 3 (Three) Times a Day.    HYDROcodone-acetaminophen (NORCO)  MG per tablet 6/13/2017 Self Yes Yes    Take 1 tablet by mouth 3 (Three) Times a Day As Needed for Moderate Pain (4-6).    lisinopril (PRINIVIL,ZESTRIL) 40 MG tablet 6/13/2017 Self No Yes    Take 1 tablet by mouth Daily.    omeprazole (priLOSEC) 20 MG capsule 6/13/2017 Self No Yes    Take 1 capsule by mouth 2 (Two) Times a Day.    Patient taking differently:  Take 20 mg by mouth Every 12 (Twelve) Hours.    temazepam (RESTORIL) 15 MG capsule 6/12/2017 Self Yes Yes    Take 15 mg by mouth At Night As Needed for Sleep.    traMADol (ULTRAM) 50 MG tablet 6/13/2017 Self Yes Yes    Take 50 mg by mouth 2 (Two) Times a Day.          Hospital Medications (active)       Dose Frequency Start End    aluminum-magnesium hydroxide-simethicone (MAALOX MAX) 400-400-40 MG/5ML suspension 10 mL 10 mL Every 6 Hours 6/18/2017     Sig - Route: Take 10 mL by mouth Every 6 (Six) Hours. - Oral    chlordiazePOXIDE (LIBRIUM) capsule 50 mg 50 mg Every 6 Hours Scheduled 6/14/2017 6/24/2017    Sig - Route: Take 2 capsules by mouth Every 6 (Six) Hours. - Oral    famotidine (PEPCID) injection 20 mg 20 mg 2 Times Daily 6/18/2017     Sig - Route: Infuse 2 mL into a venous catheter 2 (Two) Times a Day. - Intravenous    FLUoxetine (PROzac) capsule 20 mg 20 mg Daily 6/18/2017     Sig - Route: Take 1 capsule by mouth Daily. - Oral    gabapentin (NEURONTIN) capsule 300 mg 300 mg Every 8 Hours Scheduled 6/14/2017     Sig - Route: Take 1 capsule by mouth  Every 8 (Eight) Hours. - Oral    HYDROcodone-acetaminophen (NORCO) 7.5-325 MG per tablet 1 tablet 1 tablet Every 6 Hours PRN 6/15/2017 6/25/2017    Sig - Route: Take 1 tablet by mouth Every 6 (Six) Hours As Needed for Moderate Pain (4-6). - Oral    multiple vitamin (M.V.I. Adult) 10 mL, thiamine (B-1) 100 mg, folic acid 1 mg in sodium chloride 0.9 % 1,000 mL infusion 125 mL/hr Daily 6/14/2017     Sig - Route: Infuse 125 mL/hr into a venous catheter Daily. - Intravenous    nicotine (NICODERM CQ) 21 MG/24HR patch 1 patch 1 patch Daily 6/14/2017     Sig - Route: Place 1 patch on the skin Daily. - Transdermal    ondansetron (ZOFRAN) injection 4 mg 4 mg Every 6 Hours PRN 6/13/2017     Sig - Route: Infuse 2 mL into a venous catheter Every 6 (Six) Hours As Needed for Nausea or Vomiting. - Intravenous    pantoprazole (PROTONIX) EC tablet 40 mg 40 mg Every Early Morning 6/15/2017     Sig - Route: Take 1 tablet by mouth Every Morning. - Oral    QUEtiapine (SEROquel) tablet 300 mg 300 mg Nightly 6/15/2017     Sig - Route: Take 300 mg by mouth Every Night. - Oral    sodium chloride 0.9 % infusion 125 mL/hr Continuous 6/13/2017     Sig - Route: Infuse 125 mL/hr into a venous catheter Continuous. - Intravenous    traMADol (ULTRAM) tablet 50 mg 50 mg 2 Times Daily 6/14/2017     Sig - Route: Take 1 tablet by mouth 2 (Two) Times a Day. - Oral          Lab Results (most recent)     Procedure Component Value Units Date/Time    Magnesium [[id number]]  (Abnormal) Collected:  06/13/17 1822    Specimen:  Blood Updated:  06/13/17 1849     Magnesium 2.4 (H) mg/dL     Lipase [[id number]]  (Normal) Collected:  06/13/17 1822    Specimen:  Blood Updated:  06/13/17 1849     Lipase 51 U/L     Amylase [[id number]]  (Normal) Collected:  06/13/17 1822    Specimen:  Blood Updated:  06/13/17 1849     Amylase 95 U/L     Ethanol [[id number]]  (Normal) Collected:  06/13/17 1822    Specimen:  Blood Updated:  06/13/17 1849     Ethanol % <0.010 %     Narrative:        Not for legal purposes. Chain of Custody not followed.     Protime-INR [[id number]]  (Normal) Collected:  06/13/17 1822    Specimen:  Blood Updated:  06/13/17 1849     Protime 14.5 Seconds      INR 1.09    aPTT [[id number]]  (Normal) Collected:  06/13/17 1822    Specimen:  Blood Updated:  06/13/17 1849     PTT 27.2 seconds     D-dimer, Quantitative [[id number]]  (Abnormal) Collected:  06/13/17 1822    Specimen:  Blood Updated:  06/13/17 1849     D-Dimer, Quantitative 2.22 (H) mg/L (FEU)     Narrative:       Reference Range is 0-0.50 mg/L FEU. However, results <0.50 mg/L FEU tends to rule out DVT or PE. Results >0.50 mg/L FEU are not useful in predicting absence or presence of DVT or PE.    Comprehensive Metabolic Panel [[id number]]  (Abnormal) Collected:  06/13/17 1822    Specimen:  Blood Updated:  06/13/17 1856     Glucose 97 mg/dL      BUN 79 (H) mg/dL      Creatinine 3.41 (H) mg/dL      Sodium 141 mmol/L      Potassium 6.7 (C) mmol/L      Chloride 105 mmol/L      CO2 22.0 (L) mmol/L      Calcium 7.9 (L) mg/dL      Total Protein 7.1 g/dL      Albumin 3.80 g/dL      ALT (SGPT) 49 U/L      AST (SGOT) 61 (H) U/L      Alkaline Phosphatase 83 U/L      Total Bilirubin 0.5 mg/dL      eGFR Non African Amer 19 (L) mL/min/1.73      Globulin 3.3 gm/dL      A/G Ratio 1.2 g/dL      BUN/Creatinine Ratio 23.2     Anion Gap 14.0 (H) mmol/L     BNP [[id number]]  (Abnormal) Collected:  06/13/17 1822    Specimen:  Blood Updated:  06/13/17 1900     proBNP 1580.0 (H) pg/mL     Troponin [[id number]]  (Normal) Collected:  06/13/17 1822    Specimen:  Blood Updated:  06/13/17 1900     Troponin I <0.012 ng/mL     Blood Gas, Arterial [[id number]]  (Abnormal) Collected:  06/13/17 1913    Specimen:  Arterial Blood Updated:  06/13/17 1918     Site Arterial: left radial     Darvin's Test --      Documented in Rapid Comm        pH, Arterial 7.345 (L) pH units      pCO2, Arterial 38.5 mm Hg      pO2, Arterial 78.0 (L) mm Hg      HCO3, Arterial 20.5  (L) mmol/L      Base Excess, Arterial -4.6 (L) mmol/L      O2 Saturation, Arterial 95.0 %      O2 Saturation Calculated 95.0 %      Barometric Pressure for Blood Gas -- mmHg       Component not reported at this site.        Modality Cannula     Flow Rate 2.00 lpm     Narrative:       Serial Number: [id number]    : [id number]    Lactic Acid, Plasma [[id number]]  (Normal) Collected:  06/13/17 1912    Specimen:  Blood Updated:  06/13/17 1935     Lactate 0.6 mmol/L     Urinalysis With / Culture If Indicated [[id number]]  (Abnormal) Collected:  06/13/17 1931    Specimen:  Urine from Urine, Catheter Updated:  06/13/17 1947     Color, UA Yellow     Appearance, UA Clear     pH, UA <=5.0     Specific Gravity, UA 1.019     Glucose, UA Negative     Ketones, UA Negative     Bilirubin, UA Negative     Blood, UA Negative     Protein, UA Trace (A)     Leuk Esterase, UA Negative     Nitrite, UA Negative     Urobilinogen, UA 0.2 E.U./dL    Narrative:       Urine microscopic not indicated.    Light Blue Top [[id number]] Collected:  06/13/17 1822    Specimen:  Blood Updated:  06/13/17 2001     Extra Tube hold for add-on      Auto resulted       Green Top (Gel) [[id number]] Collected:  06/13/17 1822    Specimen:  Blood Updated:  06/13/17 2001     Extra Tube Hold for add-ons.      Auto resulted.       TSH [[id number]]  (Normal) Collected:  06/13/17 1912    Specimen:  Blood Updated:  06/13/17 2007     TSH 1.330 mIU/mL     CBC & Differential [[id number]] Collected:  06/13/17 1912    Specimen:  Blood Updated:  06/13/17 2016    Narrative:       The following orders were created for panel order CBC & Differential.  Procedure                               Abnormality         Status                     ---------                               -----------         ------                     Scan Slide[[id number]]                                       Final result               CBC Auto Differential[[id number]]        Abnormal            Final result                  Please view results for these tests on the individual orders.    CBC Auto Differential [[id number]]  (Abnormal) Collected:  06/13/17 1912    Specimen:  Blood Updated:  06/13/17 2016     WBC 12.26 (H) 10*3/mm3      RBC 3.57 (L) 10*6/mm3      Hemoglobin 7.2 (L) g/dL      Hematocrit 24.4 (L) %      MCV 68.3 (L) fL      MCH 20.2 (L) pg      MCHC 29.5 (L) g/dL      RDW 21.0 (H) %      RDW-SD 46.4 fl      MPV 9.5 fL      Platelets 277 10*3/mm3      Neutrophil % 78.7 (H) %      Lymphocyte % 12.3 (L) %      Monocyte % 6.8 %      Eosinophil % 1.0 %      Basophil % 0.2 %      Immature Grans % 1.0 %      Neutrophils, Absolute 9.66 (H) 10*3/mm3      Lymphocytes, Absolute 1.51 10*3/mm3      Monocytes, Absolute 0.83 10*3/mm3      Eosinophils, Absolute 0.12 10*3/mm3      Basophils, Absolute 0.02 10*3/mm3      Immature Grans, Absolute 0.12 (H) 10*3/mm3      nRBC 0.0 /100 WBC     Scan Slide [[id number]] Collected:  06/13/17 1912    Specimen:  Blood Updated:  06/13/17 2016     Hypochromia Slight/1+     Microcytes Slight/1+     Poikilocytes Slight/1+     WBC Morphology Normal     Platelet Estimate Adequate    Urine Drug Screen [[id number]]  (Abnormal) Collected:  06/13/17 1932    Specimen:  Urine from Urine, Clean Catch Updated:  06/13/17 2053     Amphetamine Screen, Urine Negative     Barbiturates Screen, Urine Negative     Benzodiazepine Screen, Urine Positive (A)     Cocaine Screen, Urine Negative     Methadone Screen, Urine Negative     Opiate Screen Positive (A)     Phencyclidine (PCP), Urine Negative     THC, Screen, Urine Negative    Narrative:       Negative Thresholds For Drugs Screened in Urine:    Amphetamines          500 ng/ml  Barbiturates          200 ng/ml  Benzodiazepines       200 ng/ml  Cocaine               150 ng/ml  Methadone             150 ng/ml  Opiates               300 ng/ml  Phencyclidine         25 ng/ml  THC                      50 ng/ml    The normal value for all drugs tested is negative. This  report includes final unconfirmed screening results.  A positive result by this assay can be, at your request, sent to the Reference Lab for confirmation by gas chromatography. Unconfirmed results must not be used for non-medical purposes, such as employment or legal testing. Clinical consideration should be applied to any drug of abuse test result, particularly when unconfirmed results are used.    Lavender Top [[id number]] Collected:  06/13/17 1912    Specimen:  Blood Updated:  06/13/17 2101     Extra Tube hold for add-on      Auto resulted       [place] Draw [[id number]] Collected:  06/13/17 1822    Specimen:  Blood Updated:  06/13/17 2146    Narrative:       The following orders were created for panel order [place] Draw.  Procedure                               Abnormality         Status                     ---------                               -----------         ------                     Light Blue Top[[id number]]                                   Final result               Green Top (Gel)[[id number]]                                  Final result               Lavender Top[[id number]]                                     Final result               Red Top[[id number]]                                                                     Green Top (No Gel)[[id number]]                                                            Please view results for these tests on the individual orders.    Ammonia [[id number]]  (Abnormal) Collected:  06/13/17 2225    Specimen:  Blood Updated:  06/13/17 2243     Ammonia <9 (L) umol/L     Acetaminophen Level [[id number]]  (Abnormal) Collected:  06/13/17 2350    Specimen:  Blood Updated:  06/14/17 0049     Acetaminophen <10.0 (L) mcg/mL     Salicylate Level [[id number]]  (Abnormal) Collected:  06/13/17 2350    Specimen:  Blood Updated:  06/14/17 0049     Salicylate <1.0 (L) mg/dL     Basic Metabolic Panel [[id number]]  (Abnormal) Collected:  06/14/17 0221    Specimen:  Blood Updated:   06/14/17 0413     Glucose 88 mg/dL      BUN 69 (H) mg/dL      Creatinine 2.37 (H) mg/dL      Sodium 145 mmol/L      Potassium 5.6 (H) mmol/L      Chloride 107 mmol/L      CO2 25.0 mmol/L      Calcium 8.7 mg/dL      eGFR Non African Amer 29 (L) mL/min/1.73      BUN/Creatinine Ratio 29.1 (H)     Anion Gap 13.0 mmol/L     Narrative:       GFR Normal >60  Chronic Kidney Disease <60  Kidney Failure <15  Specimen drawn while blood infusing    CBC (No Diff) [[id number]] Collected:  06/14/17 0221    Specimen:  Blood Updated:  06/14/17 0514     WBC -- 10*3/mm3       Patient receiving blood  Corrected result. Previous result was 10.76 10*3/mm3 on 6/14/2017 at [address] [place] [address]        RBC -- 10*6/mm3       Patient receiving blood  Corrected result. Previous result was 3.69 10*6/mm3 on 6/14/2017 at [address] [place] [address]        Hemoglobin -- g/dL       Patient receiving blood  Corrected result. Previous result was 7.2 g/dL on 6/14/2017 at [address] [place] [address]        Hematocrit -- %       Patient receiving blood  Corrected result. Previous result was 25.5 % on 6/14/2017 at [address] [place] [address]        MCV -- fL       Patient receiving blood  Corrected result. Previous result was 69.1 fL on 6/14/2017 at [address] [place] [address]        MCH -- pg       Patient receiving blood  Corrected result. Previous result was 19.5 pg on 6/14/2017 at [address] [place] [address]        MCHC -- g/dL       Patient receiving blood  Corrected result. Previous result was 28.2 g/dL on 6/14/2017 at [address] [place] [address]        RDW -- %       Corrected result. Previous result was 21.3 % on 6/14/2017 at [address] [place] [address]        RDW-SD -- fl       Corrected result. Previous result was 49.0 fl on 6/14/2017 at [address] [place] [address]        MPV -- fL       Corrected result. Previous result was 10.1 fL on 6/14/2017 at [address] [place] [address]        Platelets -- 10*3/mm3       Patient receiving blood  Corrected result. Previous result was 316 10*3/mm3 on 6/14/2017 at [address] [place] [address]       Narrative:       Collected during blood transfusion    CBC (No Diff) [[id number]]  (Abnormal) Collected:   06/14/17 0618    Specimen:  Blood Updated:  06/14/17 0639     WBC 9.48 10*3/mm3      RBC 3.78 (L) 10*6/mm3      Hemoglobin 7.8 (L) g/dL      Hematocrit 26.7 (L) %      MCV 70.6 (L) fL      MCH 20.6 (L) pg      MCHC 29.2 (L) g/dL      RDW 22.7 (H) %      RDW-SD 56.4 (H) fl      MPV 9.5 fL      Platelets 253 10*3/mm3     Comprehensive Metabolic Panel [[id number]]  (Abnormal) Collected:  06/15/17 0413    Specimen:  Blood Updated:  06/15/17 0546     Glucose 78 mg/dL      BUN 23 (H) mg/dL      Creatinine 0.83 mg/dL      Sodium 143 mmol/L      Potassium 4.7 mmol/L      Chloride 106 mmol/L      CO2 25.0 mmol/L      Calcium 8.9 mg/dL      Total Protein 7.2 g/dL      Albumin 3.70 g/dL      ALT (SGPT) 50 U/L      AST (SGOT) 52 (H) U/L      Alkaline Phosphatase 79 U/L      Total Bilirubin 0.4 mg/dL      eGFR Non African Amer 96 mL/min/1.73      Globulin 3.5 gm/dL      A/G Ratio 1.1 g/dL      BUN/Creatinine Ratio 27.7 (H)     Anion Gap 12.0 mmol/L     CBC Auto Differential [[id number]]  (Abnormal) Collected:  06/15/17 0412    Specimen:  Blood Updated:  06/15/17 0557     WBC 7.20 10*3/mm3      RBC 3.90 (L) 10*6/mm3      Hemoglobin 8.0 (L) g/dL      Hematocrit 28.0 (L) %      MCV 71.8 (L) fL      MCH 20.5 (L) pg      MCHC 28.6 (L) g/dL      RDW 21.9 (H) %      RDW-SD 56.2 (H) fl      MPV 9.5 fL      Platelets 240 10*3/mm3      Neutrophil % 59.6 %      Lymphocyte % 25.1 %      Monocyte % 10.0 %      Eosinophil % 3.6 %      Basophil % 0.4 %      Immature Grans % 1.3 %      Neutrophils, Absolute 4.29 10*3/mm3      Lymphocytes, Absolute 1.81 10*3/mm3      Monocytes, Absolute 0.72 10*3/mm3      Eosinophils, Absolute 0.26 10*3/mm3      Basophils, Absolute 0.03 10*3/mm3      Immature Grans, Absolute 0.09 (H) 10*3/mm3     CBC & Differential [[id number]] Collected:  06/15/17 0412    Specimen:  Blood Updated:  06/15/17 0557    Narrative:       The following orders were created for panel order CBC & Differential.  Procedure                                Abnormality         Status                     ---------                               -----------         ------                     Scan Slide[[id number]]                                       Final result               CBC Auto Differential[[id number]]        Abnormal            Final result                 Please view results for these tests on the individual orders.    Scan Slide [[id number]] Collected:  06/15/17 0412    Specimen:  Blood Updated:  06/15/17 0557     Anisocytosis Slight/1+     Microcytes Slight/1+     Poikilocytes Slight/1+     WBC Morphology Normal     Platelet Morphology Normal    Hepatitis Panel, Acute [[id number]]  (Normal) Collected:  06/15/17 0412    Specimen:  Blood Updated:  06/15/17 0617     HCV S/C Ratio 0.11     Hepatitis C Ab Negative     Hep A IgM Negative     Hep B C IgM Negative     Hepatitis B Surface Ag Negative    Urine Culture [[id number]]  (Normal) Collected:  06/14/17 0019    Specimen:  Urine from Urine, Clean Catch Updated:  06/16/17 0600     Urine Culture No growth at 2 days    Comprehensive Metabolic Panel [[id number]]  (Abnormal) Collected:  06/16/17 0603    Specimen:  Blood Updated:  06/16/17 0635     Glucose 90 mg/dL      BUN 12 mg/dL      Creatinine 0.85 mg/dL      Sodium 144 mmol/L      Potassium 4.4 mmol/L      Chloride 105 mmol/L      CO2 24.0 mmol/L      Calcium 9.7 mg/dL      Total Protein 8.0 g/dL      Albumin 4.10 g/dL      ALT (SGPT) 39 U/L      AST (SGOT) 53 (H) U/L      Alkaline Phosphatase 87 U/L      Total Bilirubin 0.4 mg/dL      eGFR Non African Amer 94 mL/min/1.73      Globulin 3.9 gm/dL      A/G Ratio 1.1 g/dL      BUN/Creatinine Ratio 14.1     Anion Gap 15.0 (H) mmol/L     CBC & Differential [[id number]] Collected:  06/16/17 0603    Specimen:  Blood Updated:  06/16/17 0640    Narrative:       The following orders were created for panel order CBC & Differential.  Procedure                               Abnormality         Status                      ---------                               -----------         ------                     Scan Slide[[id number]]                                       Final result               CBC Auto Differential[[id number]]        Abnormal            Final result                 Please view results for these tests on the individual orders.    CBC Auto Differential [[id number]]  (Abnormal) Collected:  06/16/17 0603    Specimen:  Blood Updated:  06/16/17 0640     WBC 8.44 10*3/mm3      RBC 4.39 (L) 10*6/mm3      Hemoglobin 9.1 (L) g/dL      Hematocrit 30.6 (L) %      MCV 69.7 (L) fL      MCH 20.7 (L) pg      MCHC 29.7 (L) g/dL      RDW 22.2 (H) %      RDW-SD 54.8 (H) fl      MPV 9.4 fL      Platelets 236 10*3/mm3      Neutrophil % 50.6 %      Lymphocyte % 31.6 %      Monocyte % 8.9 %      Eosinophil % 3.8 %      Basophil % 0.5 %      Immature Grans % 4.6 %      Neutrophils, Absolute 4.27 10*3/mm3      Lymphocytes, Absolute 2.67 10*3/mm3      Monocytes, Absolute 0.75 10*3/mm3      Eosinophils, Absolute 0.32 10*3/mm3      Basophils, Absolute 0.04 10*3/mm3      Immature Grans, Absolute 0.39 (H) 10*3/mm3     Scan Slide [[id number]] Collected:  06/16/17 0603    Specimen:  Blood Updated:  06/16/17 0640     Anisocytosis Slight/1+     Hypochromia Slight/1+     Poikilocytes Slight/1+     WBC Morphology Normal     Platelet Morphology Normal    Comprehensive Metabolic Panel [[id number]]  (Abnormal) Collected:  06/17/17 0622    Specimen:  Blood Updated:  06/17/17 0643     Glucose 113 (H) mg/dL      BUN 16 mg/dL      Creatinine 0.85 mg/dL      Sodium 142 mmol/L      Potassium 4.0 mmol/L      Chloride 103 mmol/L      CO2 22.0 (L) mmol/L      Calcium 9.0 mg/dL      Total Protein 8.2 g/dL      Albumin 4.30 g/dL      ALT (SGPT) 42 U/L      AST (SGOT) 61 (H) U/L      Alkaline Phosphatase 84 U/L      Total Bilirubin 0.5 mg/dL      eGFR Non African Amer 94 mL/min/1.73      Globulin 3.9 gm/dL      A/G Ratio 1.1 g/dL      BUN/Creatinine Ratio 18.8     Anion  Gap 17.0 (H) mmol/L     CBC & Differential [[id number]] Collected:  06/17/17 0622    Specimen:  Blood Updated:  06/17/17 0702    Narrative:       The following orders were created for panel order CBC & Differential.  Procedure                               Abnormality         Status                     ---------                               -----------         ------                     Manual Differential[[id number]]          Abnormal            Final result               Scan Slide[[id number]]                                       Final result               CBC Auto Differential[[id number]]        Abnormal            Final result                 Please view results for these tests on the individual orders.    CBC Auto Differential [[id number]]  (Abnormal) Collected:  06/17/17 0622    Specimen:  Blood Updated:  06/17/17 0702     WBC 8.90 10*3/mm3      RBC 4.54 (L) 10*6/mm3      Hemoglobin 9.4 (L) g/dL      Hematocrit 31.7 (L) %      MCV 69.8 (L) fL      MCH 20.7 (L) pg      MCHC 29.7 (L) g/dL      RDW 22.5 (H) %      RDW-SD 55.6 (H) fl      MPV 9.3 fL      Platelets 261 10*3/mm3     Scan Slide [[id number]] Collected:  06/17/17 0622    Specimen:  Blood Updated:  06/17/17 0702     Scan Slide --      See Manual Differential Results       Manual Differential [[id number]]  (Abnormal) Collected:  06/17/17 0622    Specimen:  Blood Updated:  06/17/17 0702     Neutrophil % 68.0 %      Lymphocyte % 21.0 %      Monocyte % 5.0 %      Eosinophil % 5.0 (H) %      Basophil % 1.0 %      Neutrophils Absolute 6.05 10*3/mm3      Lymphocytes Absolute 1.87 10*3/mm3      Monocytes Absolute 0.45 10*3/mm3      Eosinophils Absolute 0.45 10*3/mm3      Basophils Absolute 0.09 10*3/mm3      Microcytes Slight/1+     Ovalocytes Slight/1+     Poikilocytes Slight/1+     WBC Morphology Normal     Platelet Estimate Adequate    Basic Metabolic Panel [[id number]]  (Abnormal) Collected:  06/18/17 0422    Specimen:  Blood Updated:  06/18/17 0521     Glucose  91 mg/dL      BUN 15 mg/dL      Creatinine 0.90 mg/dL      Sodium 144 mmol/L      Potassium 4.3 mmol/L      Chloride 105 mmol/L      CO2 25.0 mmol/L      Calcium 9.5 mg/dL      eGFR Non African Amer 88 mL/min/1.73      BUN/Creatinine Ratio 16.7     Anion Gap 14.0 (H) mmol/L     Narrative:       GFR Normal >60  Chronic Kidney Disease <60  Kidney Failure <15    CBC & Differential [[id number]] Collected:  06/18/17 0525    Specimen:  Blood Updated:  06/18/17 0629    Narrative:       The following orders were created for panel order CBC & Differential.  Procedure                               Abnormality         Status                     ---------                               -----------         ------                     Scan Slide[[id number]]                                       Final result               CBC Auto Differential[[id number]]        Abnormal            Final result                 Please view results for these tests on the individual orders.    CBC Auto Differential [[id number]]  (Abnormal) Collected:  06/18/17 0525    Specimen:  Blood Updated:  06/18/17 0629     WBC 10.16 10*3/mm3      RBC 4.68 (L) 10*6/mm3      Hemoglobin 9.8 (L) g/dL      Hematocrit 33.0 (L) %      MCV 70.5 (L) fL      MCH 20.9 (L) pg      MCHC 29.7 (L) g/dL      RDW 22.7 (H) %      RDW-SD 56.8 (H) fl      MPV 9.8 fL      Platelets 281 10*3/mm3      Neutrophil % 55.7 %      Lymphocyte % 29.8 %      Monocyte % 8.2 %      Eosinophil % 3.4 %      Basophil % 0.3 %      Immature Grans % 2.6 %      Neutrophils, Absolute 5.66 10*3/mm3      Lymphocytes, Absolute 3.03 10*3/mm3      Monocytes, Absolute 0.83 10*3/mm3      Eosinophils, Absolute 0.35 10*3/mm3      Basophils, Absolute 0.03 10*3/mm3      Immature Grans, Absolute 0.26 (H) 10*3/mm3      nRBC 0.0 /100 WBC     Scan Slide [[id number]] Collected:  06/18/17 0525    Specimen:  Blood Updated:  06/18/17 0629     Anisocytosis Slight/1+     Microcytes Mod/2+     WBC Morphology Normal     Platelet  Morphology Normal    Basic Metabolic Panel [[id number]]  (Normal) Collected:  06/19/17 0525    Specimen:  Blood Updated:  06/19/17 0608     Glucose 100 mg/dL      BUN 16 mg/dL      Creatinine 0.87 mg/dL      Sodium 142 mmol/L      Potassium 3.9 mmol/L      Chloride 104 mmol/L      CO2 25.0 mmol/L      Calcium 9.0 mg/dL      eGFR Non African Amer 91 mL/min/1.73      BUN/Creatinine Ratio 18.4     Anion Gap 13.0 mmol/L     Narrative:       GFR Normal >60  Chronic Kidney Disease <60  Kidney Failure <15    CBC & Differential [[id number]] Collected:  06/19/17 0540    Specimen:  Blood Updated:  06/19/17 0730    Narrative:       The following orders were created for panel order CBC & Differential.  Procedure                               Abnormality         Status                     ---------                               -----------         ------                     Scan Slide[[id number]]                                       Final result               CBC Auto Differential[[id number]]        Abnormal            Final result                 Please view results for these tests on the individual orders.    CBC Auto Differential [[id number]]  (Abnormal) Collected:  06/19/17 0540    Specimen:  Blood Updated:  06/19/17 0730     WBC 7.36 10*3/mm3      RBC 4.57 (L) 10*6/mm3      Hemoglobin 9.5 (L) g/dL      Hematocrit 32.7 (L) %      MCV 71.6 (L) fL      MCH 20.8 (L) pg      MCHC 29.1 (L) g/dL      RDW 23.1 (H) %      RDW-SD 58.8 (H) fl      MPV 10.1 fL      Platelets 298 10*3/mm3      Neutrophil % 51.9 %      Lymphocyte % 34.2 %      Monocyte % 9.1 %      Eosinophil % 3.4 %      Basophil % 0.3 %      Immature Grans % 1.1 %      Neutrophils, Absolute 3.82 10*3/mm3      Lymphocytes, Absolute 2.52 10*3/mm3      Monocytes, Absolute 0.67 10*3/mm3      Eosinophils, Absolute 0.25 10*3/mm3      Basophils, Absolute 0.02 10*3/mm3      Immature Grans, Absolute 0.08 (H) 10*3/mm3      nRBC 0.0 /100 WBC     Scan Slide [[id number]] Collected:   06/19/17 0540    Specimen:  Blood Updated:  06/19/17 0730     Anisocytosis Mod/2+     Hypochromia Slight/1+     Microcytes Slight/1+     Poikilocytes Slight/1+     Polychromasia Slight/1+     Stomatocytes Slight/1+     WBC Morphology Normal     Platelet Morphology Normal    Basic Metabolic Panel [[id number]]  (Abnormal) Collected:  06/21/17 0534    Specimen:  Blood Updated:  06/21/17 0607     Glucose 95 mg/dL      BUN 21 mg/dL      Creatinine 0.95 mg/dL      Sodium 142 mmol/L      Potassium 4.6 mmol/L      Chloride 101 mmol/L      CO2 26.0 mmol/L      Calcium 9.4 mg/dL      eGFR Non African Amer 82 mL/min/1.73      BUN/Creatinine Ratio 22.1     Anion Gap 15.0 (H) mmol/L     Narrative:       GFR Normal >60  Chronic Kidney Disease <60  Kidney Failure <15    CBC & Differential [[id number]] Collected:  06/21/17 0534    Specimen:  Blood Updated:  06/21/17 0624    Narrative:       The following orders were created for panel order CBC & Differential.  Procedure                               Abnormality         Status                     ---------                               -----------         ------                     Scan Slide[[id number]]                                       Final result               CBC Auto Differential[[id number]]        Abnormal            Final result                 Please view results for these tests on the individual orders.    CBC Auto Differential [[id number]]  (Abnormal) Collected:  06/21/17 0534    Specimen:  Blood Updated:  06/21/17 0624     WBC 7.75 10*3/mm3      RBC 4.62 (L) 10*6/mm3      Hemoglobin 9.5 (L) g/dL      Hematocrit 32.8 (L) %      MCV 71.0 (L) fL      MCH 20.6 (L) pg      MCHC 29.0 (L) g/dL      RDW 22.6 (H) %      RDW-SD 57.8 (H) fl      MPV 9.9 fL      Platelets 446 (H) 10*3/mm3      Neutrophil % 57.3 %      Lymphocyte % 32.4 %      Monocyte % 5.4 %      Eosinophil % 3.6 %      Basophil % 0.3 %      Immature Grans % 1.0 %      Neutrophils, Absolute 4.44 10*3/mm3       Lymphocytes, Absolute 2.51 10*3/mm3      Monocytes, Absolute 0.42 10*3/mm3      Eosinophils, Absolute 0.28 10*3/mm3      Basophils, Absolute 0.02 10*3/mm3      Immature Grans, Absolute 0.08 (H) 10*3/mm3     Narrative:       Appended report.  These results have been appended to a previously verified report.    Scan Slide [[id number]] Collected:  06/21/17 0534    Specimen:  Blood Updated:  06/21/17 0624     Microcytes Mod/2+     WBC Morphology Normal     Platelet Estimate Increased          Imaging Results (most recent)     Procedure Component Value Units Date/Time    XR Chest 1 View [[id number]] Collected:  06/13/17 1822     Updated:  06/13/17 2100    Narrative:       EXAMINATION:   XR CHEST 1 VW-  6/13/2017 6:12 PM CDT     HISTORY: Hypoxia.     A single view of the chest is obtained. Chronic change is present in the  pulmonary parenchyma, similar to 09/19/2015. The cardiac silhouette is  mildly enlarged.       Impression:       Chronic interstitial change in the pulmonary parenchyma is  present. Subtle superimposed interstitial edema cannot be excluded.  This report was finalized on 06/13/2017 20:57 by Dr. Mode Willams MD.    CT Head Without Contrast [[id number]] Collected:  06/13/17 2114     Updated:  06/13/17 2158    Narrative:       CT BRAIN WITHOUT CONTRAST 6/13/2017 8:55 PM CDT     HISTORY: Altered mental status.     COMPARISON: 09/16/2015      DLP: 811 mGy cm     TECHNIQUE: Serial axial tomographic images of the brain were obtained  without the use of intravenous contrast.      FINDINGS:   The midline structures are nondisplaced. There is mild cerebral and  cerebellar volume loss, with an associated increase in the prominence of  the ventricles and sulci. The basilar cisterns are normal in size and  configuration. There is no evidence of intracranial hemorrhage or  mass-effect.     . There are no abnormal extra-axial fluid collections. There is no  evidence of tonsillar herniation.      The included orbits and  their contents are unremarkable. The visualized  paranasal sinuses, mastoid air cells and middle ear cavities are clear.  The visualized osseous structures and overlying soft tissues of the  skull and face are intact.        Impression:       No acute intracranial abnormality is identified. Mild cortical volume  loss is noted.        This report was finalized on 06/13/2017 21:55 by Dr. Mode Willams MD.    NM Lung Ventilation Perfusion [[id number]] Collected:  06/14/17 0733     Updated:  06/14/17 0737    Narrative:       EXAMINATION: NM LUNG VENTILATION PERFUSION- 6/14/2017 7:33 AM CDT     HISTORY: Elevated d-dimer.     Dose:  1. 11.3 mCi xenon-133 via inhalation.  2. 5.5 mCi technetium 99m MAA intravenously.     REPORT: Comparison is made with the chest x-ray of 06/13/2017, with no  focal infiltrates demonstrated. The ventilation images show normal  distribution of xenon within the lungs. The perfusion images show normal  homogeneous distribution of technetium within the lungs, there are no  filling defects to suggest significant pulmonary emboli.       Impression:       Low probability ventilation/perfusion lung scan for  significant pulmonary emboli.     A preliminary report was provided by the [facility] radiology service.     This report was finalized on 06/14/2017 07:34 by Dr. Neil Modi MD.    CT Cervical Spine Without Contrast [[id number]] Collected:  06/14/17 0738     Updated:  06/14/17 0744    Narrative:       EXAMINATION: CT CERVICAL SPINE WO CONTRAST- 6/14/2017 7:38 AM CDT     HISTORY: Neck pain, acute encephalopathy.     DOSE: 633 mGycm (Automatic exposure control technique was implemented in  an effort to keep the radiation dose as low as possible without  compromising image quality)     Report: Multiple axial images of the cervical spine were obtained  without contrast, the examination includes reformatted sagittal and  coronal images. There are no comparison studies.     Alignment is normal except for  slight retrolisthesis of C5 relative to  C6, this is compatible with grade 1 spondylolisthesis. There is moderate  disc space narrowing, endplate spurring and sclerosis at C5-6. No  fracture is identified. The prevertebral soft tissues have normal  thickness. There is mild central canal stenosis at C5-6. There is  moderate facet degeneration on the left C2-3, C3-4 and C4-5. There is  mild bilateral neural foraminal narrowing at C5-6 related to  osteophytes.       Impression:       Impression: No acute cervical spine abnormality. Chronic degenerative  disc disease at C5-6.     A preliminary report was provided by the [facility] radiology service.           This report was finalized on 06/14/2017 07:41 by Dr. Neil Modi MD.          ECG/EMG Results (most recent)     Procedure Component Value Units Date/Time    ECG 12 Lead [[id number]] Collected:  06/13/17 1710     Updated:  06/14/17 2138    Narrative:       Test Reason : Overdose  Blood Pressure : **/** mmHG  Vent. Rate : 105 BPM     Atrial Rate : 105 BPM     P-R Int : 188 ms          QRS Dur : 096 ms      QT Int : 332 ms       P-R-T Axes : 025 -22 048 degrees     QTc Int : 438 ms    Sinus tachycardia with occasional Premature ventricular complexes  Otherwise normal ECG  When compared with ECG of 23-OCT-2014 18:20,  Premature ventricular complexes are now Present  Borderline criteria for Lateral infarct are no longer Present    Referred By:  ESE           Confirmed By:Augustine Black MD           Physician Progress Notes (most recent note)      Davie Carl MD at 6/21/2017  9:25 AM  Version 1 of 1             [facility]  INPATIENT PROGRESS NOTE    Length of Stay: 8  Date of Admission: 6/13/2017  Primary Care Physician: No Known Provider    Subjective   Chief Complaint: Suicidal thought    Drug Overdose   Pertinent negatives include no abdominal pain, arthralgias, chest pain, chills, coughing, fever, headaches,  joint swelling, myalgias, nausea, rash, vomiting or weakness.      Suicidal thoughts.  [facility] behavioral health for suicidal thoughts onToday. Denies of any chest pain or sob. At this time they still don't have any beds.  Patient is calm no tremors.    Review of Systems   Constitutional: Negative for activity change, appetite change, chills and fever.   HENT: Negative for hearing loss, nosebleeds, tinnitus and trouble swallowing.    Eyes: Negative for visual disturbance.   Respiratory: Negative for cough, chest tightness, shortness of breath and wheezing.    Cardiovascular: Negative for chest pain, palpitations and leg swelling.   Gastrointestinal: Negative for abdominal distention, abdominal pain, blood in stool, constipation, diarrhea, nausea and vomiting.   Endocrine: Negative for cold intolerance, heat intolerance, polydipsia, polyphagia and polyuria.   Genitourinary: Negative for decreased urine volume, difficulty urinating, dysuria, flank pain, frequency and hematuria.   Musculoskeletal: Negative for arthralgias, joint swelling and myalgias.   Skin: Negative for rash.   Allergic/Immunologic: Negative for immunocompromised state.   Neurological: Negative for dizziness, syncope, weakness, light-headedness and headaches.   Hematological: Negative for adenopathy. Does not bruise/bleed easily.   Psychiatric/Behavioral: Negative for confusion and sleep disturbance. The patient is not nervous/anxious.           All pertinent negatives and positives are as above. All other systems have been reviewed and are negative unless otherwise stated.     Objective    Temp:  [97.7 °F (36.5 °C)-98.1 °F (36.7 °C)] 98 °F (36.7 °C)  Heart Rate:  [71-83] 74  Resp:  [16-18] 18  BP: (107-143)/(66-89) 134/82    Intake/Output Summary (Last 24 hours) at 06/21/17 2016  Last data filed at 06/21/17 0740   Gross per 24 hour    Intake             3475 ml   Output                0 ml   Net             3475 ml     Physical Exam   Constitutional: He is oriented to person, place, and time. He appears well-developed and well-nourished.   HENT:   Head: Normocephalic and atraumatic.   Eyes: Conjunctivae and EOM are normal. Pupils are equal, round, and reactive to light.   Neck: Neck supple. No JVD present. No thyromegaly present.   Cardiovascular: Normal rate, regular rhythm, normal heart sounds and intact distal pulses.  Exam reveals no gallop and no friction rub.    No murmur heard.  Pulmonary/Chest: Effort normal and breath sounds normal. No respiratory distress. He has no wheezes. He has no rales. He exhibits no tenderness.   Abdominal: Soft. Bowel sounds are normal. He exhibits no distension. There is no tenderness. There is no rebound and no guarding.   Musculoskeletal: Normal range of motion. He exhibits no edema, tenderness or deformity.   Lymphadenopathy:     He has no cervical adenopathy.   Neurological: He is alert and oriented to person, place, and time. He displays normal reflexes. No cranial nerve deficit. He exhibits normal muscle tone.   Skin: Skin is warm and dry. No rash noted.   Psychiatric: He has a normal mood and affect. His behavior is normal. Judgment and thought content normal.       Results Review:  Lab Results (last 24 hours)     Procedure Component Value Units Date/Time    Basic Metabolic Panel [[id number]]  (Abnormal) Collected:  06/21/17 0534    Specimen:  Blood Updated:  06/21/17 0607     Glucose 95 mg/dL      BUN 21 mg/dL      Creatinine 0.95 mg/dL      Sodium 142 mmol/L      Potassium 4.6 mmol/L      Chloride 101 mmol/L      CO2 26.0 mmol/L      Calcium 9.4 mg/dL      eGFR Non African Amer 82 mL/min/1.73      BUN/Creatinine Ratio 22.1     Anion Gap 15.0 (H) mmol/L     Narrative:       GFR Normal >60  Chronic Kidney Disease <60  Kidney Failure <15    CBC & Differential [[id number]] Collected:  06/21/17 0534     Specimen:  Blood Updated:  06/21/17 0624    Narrative:       The following orders were created for panel order CBC & Differential.  Procedure                               Abnormality         Status                     ---------                               -----------         ------                     Scan Slide[[id number]]                                       Final result               CBC Auto Differential[[id number]]        Abnormal            Final result                 Please view results for these tests on the individual orders.    CBC Auto Differential [[id number]]  (Abnormal) Collected:  06/21/17 0534    Specimen:  Blood Updated:  06/21/17 0624     WBC 7.75 10*3/mm3      RBC 4.62 (L) 10*6/mm3      Hemoglobin 9.5 (L) g/dL      Hematocrit 32.8 (L) %      MCV 71.0 (L) fL      MCH 20.6 (L) pg      MCHC 29.0 (L) g/dL      RDW 22.6 (H) %      RDW-SD 57.8 (H) fl      MPV 9.9 fL      Platelets 446 (H) 10*3/mm3      Neutrophil % 57.3 %      Lymphocyte % 32.4 %      Monocyte % 5.4 %      Eosinophil % 3.6 %      Basophil % 0.3 %      Immature Grans % 1.0 %      Neutrophils, Absolute 4.44 10*3/mm3      Lymphocytes, Absolute 2.51 10*3/mm3      Monocytes, Absolute 0.42 10*3/mm3      Eosinophils, Absolute 0.28 10*3/mm3      Basophils, Absolute 0.02 10*3/mm3      Immature Grans, Absolute 0.08 (H) 10*3/mm3     Narrative:       Appended report.  These results have been appended to a previously verified report.    Scan Slide [[id number]] Collected:  06/21/17 0534    Specimen:  Blood Updated:  06/21/17 0624     Microcytes Mod/2+     WBC Morphology Normal     Platelet Estimate Increased           Cultures:  Urine Culture   Date Value Ref Range Status   06/14/2017 No growth at 2 days  Final       Radiology Data:    Imaging Results (last 24 hours)     ** No results found for the last 24 hours. **          No Known Allergies    Scheduled meds:     aluminum-magnesium hydroxide-simethicone 10 mL Oral Q6H   chlordiazePOXIDE 50 mg  Oral Q6H   famotidine 20 mg Intravenous BID   FLUoxetine 20 mg Oral Daily   gabapentin 300 mg Oral Q8H   IV Fluids 1000 mL + additives 125 mL/hr Intravenous Daily   nicotine 1 patch Transdermal Daily   pantoprazole 40 mg Oral Q AM   QUEtiapine 300 mg Oral Nightly   traMADol 50 mg Oral BID       PRN meds:  HYDROcodone-acetaminophen  •  ondansetron    Assessment/Plan     Active Problems:    Acute renal failure    Altered mental status      Plan:  Altered mental status/drug overdose- Ct head, no acute change. Tylenol and salicylate levels - low, ammonia level and lactic acid level- low. Patient threating suicidal. . Suicidal watch for now.       renal failure - resolved      Hyperkalemia- resolved       Chronic pain-put patient back as Ultram and decrease the dose of Norco. Continue Neurontin.      Anemia- S/P transfuse 1 unit of PRBCs. Stable.   Elevated D-dimer. V/Q, low probability.      Alcohol abuse- continue Librium    Anxiety- Prozac.      Diet regular      Discharge Planning: Plan to transfer patient to [facility] behavioral health for suicidal thoughts when bed available or to [facility]. Still no bed  Available at this time.  Transfer when able.    Davie Carl MD   06/21/17   9:25 AM                     Electronically signed by Davie Carl MD at 6/21/2017  9:29 AM

## 2017-06-21 NOTE — PROGRESS NOTES
Continued Stay Note  [facility]     Patient Name: Magnus Grande  MRN: [id number]  Today's Date: 6/21/2017    Admit Date: 6/13/2017          Discharge Plan       06/21/17 1557    Case Management/Social Work Plan    Plan Psych placement    Additional Comments Madai at [facility] has called back and states that Dr. Obrien does not feel comfortable accepting patient until labs are better. OMAR will fax updated labs when repeated tomorrow. Rachel at [facility] has requested new clinicals for tomorrow as well.       06/21/17 1531    Case Management/Social Work Plan    Plan Psych Placement    Additional Comments Madai with [facility] called back and requested today's labs. OMAR faxed to her at [phone]. Will continue to follow.       06/21/17 1444    Case Management/Social Work Plan    Plan Psych placement    Additional Comments OMAR spoke to Madai in admissions at [facility]. Madai confirmed she will contact Dr. Obrien now for status of referral and will call OMAR back.               Discharge Codes     None            JOSHUA StarkW

## 2017-06-21 NOTE — PLAN OF CARE
Problem: Patient Care Overview (Adult)  Goal: Plan of Care Review  Outcome: Ongoing (interventions implemented as appropriate)    06/21/17 0455   Coping/Psychosocial Response Interventions   Plan Of Care Reviewed With patient   Patient Care Overview   Progress no change   Outcome Evaluation   Outcome Summary/Follow up Plan pt c/o moderate pain relieved with PRN pain medications as prescribed, pt up ad xander, VSS, safety maintained         Problem: Fall Risk (Adult)  Goal: Absence of Falls  Outcome: Outcome(s) achieved Date Met:  06/21/17    Problem: Depression (Adult,Obstetrics,Pediatric)  Goal: Establish/Maintain Self-Care Routine  Outcome: Ongoing (interventions implemented as appropriate)  Goal: Improved/Stable Mood  Outcome: Ongoing (interventions implemented as appropriate)    Problem: Overdose, Ingestion/Inhalants (Adult)  Goal: Signs and Symptoms of Listed Potential Problems Will be Absent or Manageable (Overdose, Ingestion/Inhalants)  Outcome: Ongoing (interventions implemented as appropriate)    Problem: Suicide Risk (Adult,Obstetrics,Pediatric)  Goal: Strength-Based Wellness/Recovery  Outcome: Ongoing (interventions implemented as appropriate)  Goal: Physical Safety  Outcome: Ongoing (interventions implemented as appropriate)

## 2017-06-21 NOTE — PROGRESS NOTES
Continued Stay Note  [facility]     Patient Name: Magnus Grande  MRN: [id number]  Today's Date: 6/21/2017    Admit Date: 6/13/2017          Discharge Plan       06/21/17 1250    Case Management/Social Work Plan    Plan Psych placement    Additional Comments SW left a message for [facility] admissions (cell) [phone]. Will follow for return phone call re status of referral.               Discharge Codes     None            JOSHUA StarkW

## 2017-06-21 NOTE — PLAN OF CARE
Problem: Patient Care Overview (Adult)  Goal: Plan of Care Review  Outcome: Ongoing (interventions implemented as appropriate)    06/21/17 8779   Coping/Psychosocial Response Interventions   Plan Of Care Reviewed With patient   Patient Care Overview   Progress improving   Outcome Evaluation   Outcome Summary/Follow up Plan D/C when placement available at [facility]. VSS. Continue to monitor.          Problem: Depression (Adult,Obstetrics,Pediatric)  Goal: Establish/Maintain Self-Care Routine  Outcome: Ongoing (interventions implemented as appropriate)  Goal: Improved/Stable Mood  Outcome: Ongoing (interventions implemented as appropriate)    Problem: Overdose, Ingestion/Inhalants (Adult)  Goal: Signs and Symptoms of Listed Potential Problems Will be Absent or Manageable (Overdose, Ingestion/Inhalants)  Outcome: Ongoing (interventions implemented as appropriate)    Problem: Suicide Risk (Adult,Obstetrics,Pediatric)  Goal: Strength-Based Wellness/Recovery  Outcome: Ongoing (interventions implemented as appropriate)  Goal: Physical Safety  Outcome: Ongoing (interventions implemented as appropriate)

## 2017-06-21 NOTE — PROGRESS NOTES
Continued Stay Note  [facility]     Patient Name: Magnus Grande  MRN: [id number]  Today's Date: 6/21/2017    Admit Date: 6/13/2017          Discharge Plan       06/21/17 0935    Case Management/Social Work Plan    Plan Psych placement    Additional Comments OMAR called [facility] re status of referral. Rosangela advised someone in admissions will check with Dr. Obrien this AM and will call OMAR back. [facility] has clinically accepted but still does not have bed.               Discharge Codes     None            QUETA Stark

## 2017-06-21 NOTE — PROGRESS NOTES
Continued Stay Note  [facility]     Patient Name: Magnus Grande  MRN: [id number]  Today's Date: 6/21/2017    Admit Date: 6/13/2017          Discharge Plan       06/21/17 1531    Case Management/Social Work Plan    Plan Psych Placement    Additional Comments Madai with [facility] called back and requested today's labs. OMAR faxed to her at [phone]. Will continue to follow.       06/21/17 1444    Case Management/Social Work Plan    Plan Psych placement    Additional Comments OMAR spoke to Madai in admissions at [facility]. Madai confirmed she will contact Dr. Obrien now for status of referral and will call OMAR back.       06/21/17 1250    Case Management/Social Work Plan    Plan Psych placement    Additional Comments OMAR left a message for [facility] admissions (cell) [phone]. Will follow for return phone call re status of referral.               Discharge Codes     None            JOSHUA StarkW

## 2017-06-22 RX ADMIN — HYDROCODONE BITARTRATE AND ACETAMINOPHEN 1 TABLET: 7.5; 325 TABLET ORAL at 10:11

## 2017-06-22 RX ADMIN — CHLORDIAZEPOXIDE HYDROCHLORIDE 50 MG: 25 CAPSULE ORAL at 12:05

## 2017-06-22 RX ADMIN — ALUMINUM HYDROXIDE, MAGNESIUM HYDROXIDE, AND DIMETHICONE 10 ML: 400; 400; 40 SUSPENSION ORAL at 22:33

## 2017-06-22 RX ADMIN — GABAPENTIN 300 MG: 300 CAPSULE ORAL at 00:46

## 2017-06-22 RX ADMIN — HYDROCODONE BITARTRATE AND ACETAMINOPHEN 1 TABLET: 7.5; 325 TABLET ORAL at 16:37

## 2017-06-22 RX ADMIN — FLUOXETINE HYDROCHLORIDE 20 MG: 20 CAPSULE ORAL at 08:00

## 2017-06-22 RX ADMIN — FAMOTIDINE 20 MG: 10 INJECTION, SOLUTION INTRAVENOUS at 08:02

## 2017-06-22 RX ADMIN — ALUMINUM HYDROXIDE, MAGNESIUM HYDROXIDE, AND DIMETHICONE 10 ML: 400; 400; 40 SUSPENSION ORAL at 05:34

## 2017-06-22 RX ADMIN — ALUMINUM HYDROXIDE, MAGNESIUM HYDROXIDE, AND DIMETHICONE 10 ML: 400; 400; 40 SUSPENSION ORAL at 16:37

## 2017-06-22 RX ADMIN — QUETIAPINE FUMARATE 300 MG: 200 TABLET, FILM COATED ORAL at 21:42

## 2017-06-22 RX ADMIN — FAMOTIDINE 20 MG: 10 INJECTION, SOLUTION INTRAVENOUS at 18:03

## 2017-06-22 RX ADMIN — NICOTINE 1 PATCH: 21 PATCH, EXTENDED RELEASE TRANSDERMAL at 08:10

## 2017-06-22 RX ADMIN — CHLORDIAZEPOXIDE HYDROCHLORIDE 50 MG: 25 CAPSULE ORAL at 23:33

## 2017-06-22 RX ADMIN — ALUMINUM HYDROXIDE, MAGNESIUM HYDROXIDE, AND DIMETHICONE 10 ML: 400; 400; 40 SUSPENSION ORAL at 12:06

## 2017-06-22 RX ADMIN — HYDROCODONE BITARTRATE AND ACETAMINOPHEN 1 TABLET: 7.5; 325 TABLET ORAL at 22:33

## 2017-06-22 RX ADMIN — GABAPENTIN 300 MG: 300 CAPSULE ORAL at 21:42

## 2017-06-22 RX ADMIN — CHLORDIAZEPOXIDE HYDROCHLORIDE 50 MG: 25 CAPSULE ORAL at 00:31

## 2017-06-22 RX ADMIN — PANTOPRAZOLE SODIUM 40 MG: 40 TABLET, DELAYED RELEASE ORAL at 05:38

## 2017-06-22 RX ADMIN — GABAPENTIN 300 MG: 300 CAPSULE ORAL at 05:34

## 2017-06-22 RX ADMIN — TRAMADOL HYDROCHLORIDE 50 MG: 50 TABLET, COATED ORAL at 08:00

## 2017-06-22 RX ADMIN — CHLORDIAZEPOXIDE HYDROCHLORIDE 50 MG: 25 CAPSULE ORAL at 05:33

## 2017-06-22 RX ADMIN — GABAPENTIN 300 MG: 300 CAPSULE ORAL at 14:51

## 2017-06-22 RX ADMIN — CHLORDIAZEPOXIDE HYDROCHLORIDE 50 MG: 25 CAPSULE ORAL at 18:02

## 2017-06-22 RX ADMIN — HYDROCODONE BITARTRATE AND ACETAMINOPHEN 1 TABLET: 7.5; 325 TABLET ORAL at 03:40

## 2017-06-22 RX ADMIN — ALUMINUM HYDROXIDE, MAGNESIUM HYDROXIDE, AND DIMETHICONE 10 ML: 400; 400; 40 SUSPENSION ORAL at 00:43

## 2017-06-22 RX ADMIN — TRAMADOL HYDROCHLORIDE 50 MG: 50 TABLET, COATED ORAL at 18:03

## 2017-06-22 NOTE — PROGRESS NOTES
Continued Stay Note  [facility]     Patient Name: Magnus Grande  MRN: [id number]  Today's Date: 6/22/2017    Admit Date: 6/13/2017          Discharge Plan       06/22/17 0858    Case Management/Social Work Plan    Plan [facility] - Behavioral Health Unit - Today    Final Note    Final Note OMAR spoke to Rachel Ramsey in admissions at [facility]. Rachel states that patient will have bed available today. OMAR confirmed with Rachel that MD can start on discharge now. OMAR notified MD. Discharge summary to be faxed to [phone]. Patient's nurse will call report to [phone]. Patient will have to transport by EMS due to being psych admission.               Discharge Codes       06/22/17 0858    Discharge Codes    Discharge Codes 93  R- To psych hospital or unit            QUETA Stark

## 2017-06-22 NOTE — PLAN OF CARE
Problem: Patient Care Overview (Adult)  Goal: Plan of Care Review  Outcome: Ongoing (interventions implemented as appropriate)    06/22/17 0406 06/22/17 0800   Coping/Psychosocial Response Interventions   Plan Of Care Reviewed With --  patient   Patient Care Overview   Progress improving --    Outcome Evaluation   Outcome Summary/Follow up Plan pt c/o mild to svere pain, PRN pain medications given as prescribed, VSS, safety maintained, awaiting placement --        Goal: Adult Individualization and Mutuality  Outcome: Ongoing (interventions implemented as appropriate)  Goal: Discharge Needs Assessment  Outcome: Ongoing (interventions implemented as appropriate)    Problem: Depression (Adult,Obstetrics,Pediatric)  Goal: Establish/Maintain Self-Care Routine  Outcome: Ongoing (interventions implemented as appropriate)  Goal: Improved/Stable Mood  Outcome: Ongoing (interventions implemented as appropriate)    Problem: Overdose, Ingestion/Inhalants (Adult)  Goal: Signs and Symptoms of Listed Potential Problems Will be Absent or Manageable (Overdose, Ingestion/Inhalants)  Outcome: Ongoing (interventions implemented as appropriate)    Problem: Suicide Risk (Adult,Obstetrics,Pediatric)  Goal: Strength-Based Wellness/Recovery  Outcome: Ongoing (interventions implemented as appropriate)  Goal: Physical Safety  Outcome: Ongoing (interventions implemented as appropriate)

## 2017-06-22 NOTE — PROGRESS NOTES
[facility] Medicine Services  INPATIENT PROGRESS NOTE    Length of Stay: 9  Date of Admission: 6/13/2017  Primary Care Physician: No Known Provider    Subjective   Chief Complaint: Suicidal thought    Drug Overdose   Pertinent negatives include no abdominal pain, arthralgias, chest pain, chills, coughing, fever, headaches, joint swelling, myalgias, nausea, rash, vomiting or weakness.      Suicidal thoughts.  [facility] for [facility] be admitted at [facility] behavioral health for suicidal thoughts on once again had bed but apparently the patient at outside hospital had a fall and is not able to be discharged today. So hopefully will have bed tomorrow unless something else happens.  Review of Systems   Constitutional: Negative for activity change, appetite change, chills and fever.   HENT: Negative for hearing loss, nosebleeds, tinnitus and trouble swallowing.    Eyes: Negative for visual disturbance.   Respiratory: Negative for cough, chest tightness, shortness of breath and wheezing.    Cardiovascular: Negative for chest pain, palpitations and leg swelling.   Gastrointestinal: Negative for abdominal distention, abdominal pain, blood in stool, constipation, diarrhea, nausea and vomiting.   Endocrine: Negative for cold intolerance, heat intolerance, polydipsia, polyphagia and polyuria.   Genitourinary: Negative for decreased urine volume, difficulty urinating, dysuria, flank pain, frequency and hematuria.   Musculoskeletal: Negative for arthralgias, joint swelling and myalgias.   Skin: Negative for rash.   Allergic/Immunologic: Negative for immunocompromised state.   Neurological: Negative for dizziness, syncope, weakness, light-headedness and headaches.   Hematological: Negative for adenopathy. Does not bruise/bleed easily.   Psychiatric/Behavioral: Negative for confusion and sleep disturbance. The patient is  not nervous/anxious.           All pertinent negatives and positives are as above. All other systems have been reviewed and are negative unless otherwise stated.     Objective    Temp:  [97.1 °F (36.2 °C)-98.6 °F (37 °C)] 98 °F (36.7 °C)  Heart Rate:  [] 96  Resp:  [16-20] 18  BP: (125-143)/(64-83) 131/75    Intake/Output Summary (Last 24 hours) at 06/22/17 1143  Last data filed at 06/22/17 0854   Gross per 24 hour   Intake             1290 ml   Output                0 ml   Net             1290 ml     Physical Exam   Constitutional: He is oriented to person, place, and time. He appears well-developed and well-nourished.   HENT:   Head: Normocephalic and atraumatic.   Eyes: Conjunctivae and EOM are normal. Pupils are equal, round, and reactive to light.   Neck: Neck supple. No JVD present. No thyromegaly present.   Cardiovascular: Normal rate, regular rhythm, normal heart sounds and intact distal pulses.  Exam reveals no gallop and no friction rub.    No murmur heard.  Pulmonary/Chest: Effort normal and breath sounds normal. No respiratory distress. He has no wheezes. He has no rales. He exhibits no tenderness.   Abdominal: Soft. Bowel sounds are normal. He exhibits no distension. There is no tenderness. There is no rebound and no guarding.   Musculoskeletal: Normal range of motion. He exhibits no edema, tenderness or deformity.   Lymphadenopathy:     He has no cervical adenopathy.   Neurological: He is alert and oriented to person, place, and time. He displays normal reflexes. No cranial nerve deficit. He exhibits normal muscle tone.   Skin: Skin is warm and dry. No rash noted.   Psychiatric: He has a normal mood and affect. His behavior is normal. Judgment and thought content normal.       Results Review:  Lab Results (last 24 hours)     ** No results found for the last 24 hours. **           Cultures:  Urine Culture   Date Value Ref Range Status   06/14/2017 No growth at 2 days  Final       Radiology Data:     Imaging Results (last 24 hours)     ** No results found for the last 24 hours. **          No Known Allergies    Scheduled meds:     aluminum-magnesium hydroxide-simethicone 10 mL Oral Q6H   chlordiazePOXIDE 50 mg Oral Q6H   famotidine 20 mg Intravenous BID   FLUoxetine 20 mg Oral Daily   gabapentin 300 mg Oral Q8H   IV Fluids 1000 mL + additives 125 mL/hr Intravenous Daily   nicotine 1 patch Transdermal Daily   pantoprazole 40 mg Oral Q AM   QUEtiapine 300 mg Oral Nightly   traMADol 50 mg Oral BID       PRN meds:  HYDROcodone-acetaminophen  •  ondansetron    Assessment/Plan     Active Problems:    Acute renal failure    Altered mental status      Plan:  Altered mental status/drug overdose- Ct head, no acute change. Tylenol and salicylate levels - low, ammonia level and lactic acid level- low. Patient threating suicidal. . Suicidal watch for now.       renal failure - resolved      Hyperkalemia- resolved       Chronic pain-put patient back as Ultram and decrease the dose of Norco. Continue Neurontin.      Anemia- S/P transfuse 1 unit of PRBCs. Stable.   Elevated D-dimer. V/Q, low probability.      Alcohol abuse- continue Librium    Anxiety- Prozac.      Diet regular      Discharge Planning: Plan to transfer patient to [facility] behavioral health for suicidal thoughts when bed available or to [facility]. Still no bed  Available at this time.  Transfer when able. Please see above for information.    Davie Carl MD   06/22/17   11:43 AM

## 2017-06-22 NOTE — PLAN OF CARE
Problem: Patient Care Overview (Adult)  Goal: Plan of Care Review  Outcome: Ongoing (interventions implemented as appropriate)    06/22/17 0406   Coping/Psychosocial Response Interventions   Plan Of Care Reviewed With patient   Patient Care Overview   Progress improving   Outcome Evaluation   Outcome Summary/Follow up Plan pt c/o mild to svere pain, PRN pain medications given as prescribed, VSS, safety maintained, awaiting placement         Problem: Depression (Adult,Obstetrics,Pediatric)  Goal: Establish/Maintain Self-Care Routine  Outcome: Ongoing (interventions implemented as appropriate)  Goal: Improved/Stable Mood  Outcome: Ongoing (interventions implemented as appropriate)    Problem: Overdose, Ingestion/Inhalants (Adult)  Goal: Signs and Symptoms of Listed Potential Problems Will be Absent or Manageable (Overdose, Ingestion/Inhalants)  Outcome: Ongoing (interventions implemented as appropriate)    Problem: Suicide Risk (Adult,Obstetrics,Pediatric)  Goal: Strength-Based Wellness/Recovery  Outcome: Ongoing (interventions implemented as appropriate)  Goal: Physical Safety  Outcome: Ongoing (interventions implemented as appropriate)

## 2017-06-22 NOTE — PROGRESS NOTES
Continued Stay Note  [facility]     Patient Name: Magnus Grande  MRN: [id number]  Today's Date: 6/22/2017    Admit Date: 6/13/2017          Discharge Plan       06/22/17 1544    Case Management/Social Work Plan    Plan [facility] Psych - Tomorrow    Additional Comments The [facility] patient that was discharged (to create bed availability) fell prior to leaving and must stay tonight. Rachel Ramsey states patient will need to wait until tomorrow to discharge but did confirm there are four discharges tomorrow so multiple beds available tomorrow.              Discharge Codes     None            QUETA Stark

## 2017-06-23 PROBLEM — N17.9 ACUTE RENAL FAILURE (HCC): Status: RESOLVED | Noted: 2017-06-13 | Resolved: 2017-06-23

## 2017-06-23 PROBLEM — R41.82 ALTERED MENTAL STATUS: Status: RESOLVED | Noted: 2017-06-13 | Resolved: 2017-06-23

## 2017-06-23 PROCEDURE — 25010000002 THIAMINE PER 100 MG: Performed by: FAMILY MEDICINE

## 2017-06-23 RX ORDER — PANTOPRAZOLE SODIUM 40 MG/1
40 TABLET, DELAYED RELEASE ORAL DAILY
Start: 2017-06-23

## 2017-06-23 RX ORDER — NICOTINE 21 MG/24HR
1 PATCH, TRANSDERMAL 24 HOURS TRANSDERMAL DAILY
Start: 2017-06-23

## 2017-06-23 RX ORDER — CHLORDIAZEPOXIDE HYDROCHLORIDE 25 MG/1
50 CAPSULE, GELATIN COATED ORAL EVERY 6 HOURS SCHEDULED
Refills: 0
Start: 2017-06-23 | End: 2017-06-25

## 2017-06-23 RX ORDER — FLUOXETINE HYDROCHLORIDE 20 MG/1
20 CAPSULE ORAL DAILY
Start: 2017-06-23

## 2017-06-23 RX ORDER — QUETIAPINE FUMARATE 300 MG/1
300 TABLET, FILM COATED ORAL NIGHTLY
Start: 2017-06-23

## 2017-06-23 RX ADMIN — GABAPENTIN 300 MG: 300 CAPSULE ORAL at 13:15

## 2017-06-23 RX ADMIN — ALUMINUM HYDROXIDE, MAGNESIUM HYDROXIDE, AND DIMETHICONE 10 ML: 400; 400; 40 SUSPENSION ORAL at 21:07

## 2017-06-23 RX ADMIN — HYDROCODONE BITARTRATE AND ACETAMINOPHEN 1 TABLET: 7.5; 325 TABLET ORAL at 13:13

## 2017-06-23 RX ADMIN — FLUOXETINE HYDROCHLORIDE 20 MG: 20 CAPSULE ORAL at 08:50

## 2017-06-23 RX ADMIN — HYDROCODONE BITARTRATE AND ACETAMINOPHEN 1 TABLET: 7.5; 325 TABLET ORAL at 04:39

## 2017-06-23 RX ADMIN — TRAMADOL HYDROCHLORIDE 50 MG: 50 TABLET, COATED ORAL at 17:53

## 2017-06-23 RX ADMIN — NICOTINE 1 PATCH: 21 PATCH, EXTENDED RELEASE TRANSDERMAL at 08:51

## 2017-06-23 RX ADMIN — ALUMINUM HYDROXIDE, MAGNESIUM HYDROXIDE, AND DIMETHICONE 10 ML: 400; 400; 40 SUSPENSION ORAL at 04:39

## 2017-06-23 RX ADMIN — TRAMADOL HYDROCHLORIDE 50 MG: 50 TABLET, COATED ORAL at 08:50

## 2017-06-23 RX ADMIN — FAMOTIDINE 20 MG: 10 INJECTION, SOLUTION INTRAVENOUS at 08:52

## 2017-06-23 RX ADMIN — FOLIC ACID 125 ML/HR: 5 INJECTION, SOLUTION INTRAMUSCULAR; INTRAVENOUS; SUBCUTANEOUS at 08:50

## 2017-06-23 RX ADMIN — CHLORDIAZEPOXIDE HYDROCHLORIDE 50 MG: 25 CAPSULE ORAL at 11:16

## 2017-06-23 RX ADMIN — CHLORDIAZEPOXIDE HYDROCHLORIDE 50 MG: 25 CAPSULE ORAL at 17:53

## 2017-06-23 RX ADMIN — PANTOPRAZOLE SODIUM 40 MG: 40 TABLET, DELAYED RELEASE ORAL at 06:11

## 2017-06-23 RX ADMIN — QUETIAPINE FUMARATE 300 MG: 200 TABLET, FILM COATED ORAL at 21:07

## 2017-06-23 RX ADMIN — ALUMINUM HYDROXIDE, MAGNESIUM HYDROXIDE, AND DIMETHICONE 10 ML: 400; 400; 40 SUSPENSION ORAL at 10:31

## 2017-06-23 RX ADMIN — CHLORDIAZEPOXIDE HYDROCHLORIDE 50 MG: 25 CAPSULE ORAL at 06:12

## 2017-06-23 RX ADMIN — GABAPENTIN 300 MG: 300 CAPSULE ORAL at 06:12

## 2017-06-23 RX ADMIN — GABAPENTIN 300 MG: 300 CAPSULE ORAL at 21:07

## 2017-06-23 RX ADMIN — ALUMINUM HYDROXIDE, MAGNESIUM HYDROXIDE, AND DIMETHICONE 10 ML: 400; 400; 40 SUSPENSION ORAL at 16:30

## 2017-06-23 RX ADMIN — HYDROCODONE BITARTRATE AND ACETAMINOPHEN 1 TABLET: 7.5; 325 TABLET ORAL at 19:22

## 2017-06-23 RX ADMIN — FAMOTIDINE 20 MG: 10 INJECTION, SOLUTION INTRAVENOUS at 17:53

## 2017-06-23 NOTE — PLAN OF CARE
Problem: Patient Care Overview (Adult)  Goal: Plan of Care Review  Outcome: Ongoing (interventions implemented as appropriate)    06/23/17 1701   Coping/Psychosocial Response Interventions   Plan Of Care Reviewed With patient   Patient Care Overview   Progress improving   Outcome Evaluation   Outcome Summary/Follow up Plan Patient c/o moderate pain, medicated with PO PRN and scheduled pain medication as ordered. Patient being discharged to [facility]. VSS. Safety maintained. Sitter remains at bedside. Will continue to moitor.          Problem: Depression (Adult,Obstetrics,Pediatric)  Goal: Establish/Maintain Self-Care Routine  Outcome: Ongoing (interventions implemented as appropriate)  Goal: Improved/Stable Mood  Outcome: Ongoing (interventions implemented as appropriate)    Problem: Overdose, Ingestion/Inhalants (Adult)  Goal: Signs and Symptoms of Listed Potential Problems Will be Absent or Manageable (Overdose, Ingestion/Inhalants)  Outcome: Outcome(s) achieved Date Met:  06/23/17    Problem: Suicide Risk (Adult,Obstetrics,Pediatric)  Goal: Strength-Based Wellness/Recovery  Outcome: Ongoing (interventions implemented as appropriate)  Goal: Physical Safety  Outcome: Ongoing (interventions implemented as appropriate)

## 2017-06-23 NOTE — PROGRESS NOTES
Continued Stay Note  [facility]     Patient Name: Magnus Grande  MRN: [id number]  Today's Date: 6/23/2017    Admit Date: 6/13/2017          Discharge Plan       06/23/17 0915    Case Management/Social Work Plan    Plan [facility] Behavioral Health    Final Note    Final Note SW spoke to Rachel Ramsey in admissions at [facility] and she confirmed patient can discharge to [facility] today. OMAR notified Dr. Carl. Discharge summary to be faxed to [phone]. Patient's nurse will call report to [phone]. OMAR called [phone] re transport and was advised for nurse to call one hour before transport is needed and [facility] can provide transportation.       06/23/17 0811    Case Management/Social Work Plan    Plan [facility]     Additional Comments SW left a message for Rachel Ramsey to confirm patient can admit today. Will follow for return phone call.              Discharge Codes       06/23/17 0915    Discharge Codes    Discharge Codes 93  R- To psych hospital or unit            QUETA Stark

## 2017-06-23 NOTE — PLAN OF CARE
Problem: Patient Care Overview (Adult)  Goal: Plan of Care Review  Outcome: Ongoing (interventions implemented as appropriate)    06/23/17 0318   Coping/Psychosocial Response Interventions   Plan Of Care Reviewed With patient   Patient Care Overview   Progress improving   Outcome Evaluation   Outcome Summary/Follow up Plan C/o back pain and medicated PRN, A/O, neurovascular WDL, VSS, sitter at bedside, plan is for d/c [facility] today.          Problem: Depression (Adult,Obstetrics,Pediatric)  Goal: Establish/Maintain Self-Care Routine  Outcome: Ongoing (interventions implemented as appropriate)  Goal: Improved/Stable Mood  Outcome: Ongoing (interventions implemented as appropriate)    Problem: Overdose, Ingestion/Inhalants (Adult)  Goal: Signs and Symptoms of Listed Potential Problems Will be Absent or Manageable (Overdose, Ingestion/Inhalants)  Outcome: Ongoing (interventions implemented as appropriate)    Problem: Suicide Risk (Adult,Obstetrics,Pediatric)  Goal: Strength-Based Wellness/Recovery  Outcome: Ongoing (interventions implemented as appropriate)  Goal: Physical Safety  Outcome: Ongoing (interventions implemented as appropriate)

## 2017-06-23 NOTE — DISCHARGE SUMMARY
"    [facility] Medicine Services  DISCHARGE SUMMARY       Date of Admission: 6/13/2017  Date of Discharge:  6/23/2017  Primary Care Physician: No Known Provider    Presenting Problem/History of Present Illness:  Acute renal failure, unspecified acute renal failure type [N17.9]  Altered mental status [R41.82]     Final Discharge Diagnoses:  Suicidal Ideation  Acute Blood loss Anemia  Overdose  ETOH Abuse  Depression  Acute Renal failure due to dehydration    Consults: none    Procedures Performed: Blood transfusion, V/Q Scan Negative    Pertinent Test Results: HGB 9.5 day of discharge    Chief Complaint on Day of Discharge: None    History of Present Illness on Day of Discharge:  Doing well just want to get out of here.     Hospital Course:  The patient is a 57 y.o. male who presented to [facility] with altered mental status acute renal failure dehydration anemia and overdose of Benzodiazepines. He was give charcoal in the ER treated with IV fluids, transfused PRBC no source of active bleeding was noted. He was give Librium to help with withdrawal symptoms.  He voiced suicidal ideation history of depression.  He has been evaluated and approved to go to behavioral health at [facility].  He has been waiting the last several days for a bed to be available.  Now has one today and is ready for DC.      Condition on Discharge:  Stable    Physical Exam on Discharge:  /84 (BP Location: Left arm, Patient Position: Lying)  Pulse 81  Temp 97.3 °F (36.3 °C) (Oral)   Resp 18  Ht 71\" (180.3 cm)  Wt 219 lb 6 oz (99.5 kg)  SpO2 93%  BMI 30.6 kg/m2  Physical Exam  Constitutional: He is oriented to person, place, and time. He appears well-developed and well-nourished.   HENT:   Head: Normocephalic and atraumatic.   Eyes: Conjunctivae and EOM are normal. Pupils are equal, round, and reactive to light.   Neck: Neck supple. No JVD present. No thyromegaly present.   Cardiovascular: " Normal rate, regular rhythm, normal heart sounds and intact distal pulses. Exam reveals no gallop and no friction rub.   No murmur heard.  Pulmonary/Chest: Effort normal and breath sounds normal. No respiratory distress. He has no wheezes. He has no rales. He exhibits no tenderness.   Abdominal: Soft. Bowel sounds are normal. He exhibits no distension. There is no tenderness. There is no rebound and no guarding.   Musculoskeletal: Normal range of motion. He exhibits no edema, tenderness or deformity.   Lymphadenopathy:   He has no cervical adenopathy.   Neurological: He is alert and oriented to person, place, and time. He displays normal reflexes. No cranial nerve deficit. He exhibits normal muscle tone.   Skin: Skin is warm and dry. No rash noted.   Psychiatric: He has a normal mood and affect. His behavior is normal. Judgment and thought content normal.     Discharge Disposition:  Psychiatric Hospital or Unit (DC - External)    Discharge Medications:   Magnus Grande   Home Medication Instructions MAKAYLA:[id number]    Printed on:06/23/17 5823   Medication Information                      chlordiazePOXIDE (LIBRIUM) 25 MG capsule  Take 2 capsules by mouth Every 6 (Six) Hours for 2 days.             FLUoxetine (PROzac) 20 MG capsule  Take 1 capsule by mouth Daily.             gabapentin (NEURONTIN) 300 MG capsule  Take 1 capsule by mouth 3 (Three) Times a Day.             HYDROcodone-acetaminophen (NORCO)  MG per tablet  Take 1 tablet by mouth 3 (Three) Times a Day As Needed for Moderate Pain (4-6).             lisinopril (PRINIVIL,ZESTRIL) 40 MG tablet  Take 1 tablet by mouth Daily.             multiple vitamin 10 mL, thiamine 100 mg, folic acid 1 mg in sodium chloride 0.9 % 1,000 mL  Infuse 125 mL/hr into a venous catheter Daily.             nicotine (NICODERM CQ) 21 MG/24HR patch  Place 1 patch on the skin Daily.             pantoprazole (PROTONIX) 40 MG EC tablet  Take 1 tablet by mouth Daily.              QUEtiapine (SEROquel) 300 MG tablet  Take 1 tablet by mouth Every Night.                 Discharge Diet: Cardiac    Activity at Discharge: As tolerated suicide precations    Discharge Care Plan/Instructions: DC to psych.    Follow-up Appointments:   No future appointments.    Test Results Pending at Discharge: None    Davie Carl MD  06/23/17  9:42 AM    Time: 35 minutes.

## 2017-06-24 VITALS
BODY MASS INDEX: 30.71 KG/M2 | HEIGHT: 71 IN | WEIGHT: 219.38 LBS | DIASTOLIC BLOOD PRESSURE: 83 MMHG | RESPIRATION RATE: 18 BRPM | OXYGEN SATURATION: 94 % | TEMPERATURE: 97.6 F | HEART RATE: 115 BPM | SYSTOLIC BLOOD PRESSURE: 137 MMHG

## 2017-06-24 RX ADMIN — PANTOPRAZOLE SODIUM 40 MG: 40 TABLET, DELAYED RELEASE ORAL at 05:59

## 2017-06-24 RX ADMIN — GABAPENTIN 300 MG: 300 CAPSULE ORAL at 05:59

## 2017-06-24 RX ADMIN — HYDROCODONE BITARTRATE AND ACETAMINOPHEN 1 TABLET: 7.5; 325 TABLET ORAL at 08:21

## 2017-06-24 RX ADMIN — NICOTINE 1 PATCH: 21 PATCH, EXTENDED RELEASE TRANSDERMAL at 08:22

## 2017-06-24 RX ADMIN — ALUMINUM HYDROXIDE, MAGNESIUM HYDROXIDE, AND DIMETHICONE 10 ML: 400; 400; 40 SUSPENSION ORAL at 05:59

## 2017-06-24 RX ADMIN — FLUOXETINE HYDROCHLORIDE 20 MG: 20 CAPSULE ORAL at 08:21

## 2017-06-24 RX ADMIN — CHLORDIAZEPOXIDE HYDROCHLORIDE 50 MG: 25 CAPSULE ORAL at 05:59

## 2017-06-24 RX ADMIN — CHLORDIAZEPOXIDE HYDROCHLORIDE 50 MG: 25 CAPSULE ORAL at 00:21

## 2017-06-24 RX ADMIN — TRAMADOL HYDROCHLORIDE 50 MG: 50 TABLET, COATED ORAL at 08:21

## 2017-06-24 RX ADMIN — HYDROCODONE BITARTRATE AND ACETAMINOPHEN 1 TABLET: 7.5; 325 TABLET ORAL at 02:27

## 2017-06-24 NOTE — NURSING NOTE
Ekaterina- primary nurse- notified by [facility] that they would not be able to accept patient tonight. They stated that they thought they had a patient that would discharge today so they would have that bed available but that patient did not discharge so they did not have a bed available. Kristi-  director notified, Delia- House Supervisor notified, and Ana-  on call notified. EMS canceled.

## 2017-06-24 NOTE — PROGRESS NOTES
[facility] Medicine Services  INPATIENT PROGRESS NOTE    Length of Stay: 11  Date of Admission: 6/13/2017  Primary Care Physician: No Known Provider    Subjective   Chief Complaint: Suicidal thought    Drug Overdose   Pertinent negatives include no abdominal pain, arthralgias, chest pain, chills, coughing, fever, headaches, joint swelling, myalgias, nausea, rash, vomiting or weakness.      Suicidal thoughts.  [facility] for [facility] be admitted at [facility] behavioral health for suicidal thoughts on once again had bed but apparently the patient at outside hospital had a fall and is not able to be discharged today. So hopefully will have bed tomorrow unless something else happens.  Review of Systems   Constitutional: Negative for activity change, appetite change, chills and fever.   HENT: Negative for hearing loss, nosebleeds, tinnitus and trouble swallowing.    Eyes: Negative for visual disturbance.   Respiratory: Negative for cough, chest tightness, shortness of breath and wheezing.    Cardiovascular: Negative for chest pain, palpitations and leg swelling.   Gastrointestinal: Negative for abdominal distention, abdominal pain, blood in stool, constipation, diarrhea, nausea and vomiting.   Endocrine: Negative for cold intolerance, heat intolerance, polydipsia, polyphagia and polyuria.   Genitourinary: Negative for decreased urine volume, difficulty urinating, dysuria, flank pain, frequency and hematuria.   Musculoskeletal: Negative for arthralgias, joint swelling and myalgias.   Skin: Negative for rash.   Allergic/Immunologic: Negative for immunocompromised state.   Neurological: Negative for dizziness, syncope, weakness, light-headedness and headaches.   Hematological: Negative for adenopathy. Does not bruise/bleed easily.   Psychiatric/Behavioral: Negative for confusion and sleep disturbance. The patient is  not nervous/anxious.           All pertinent negatives and positives are as above. All other systems have been reviewed and are negative unless otherwise stated.     Objective    Temp:  [97.3 °F (36.3 °C)-98.6 °F (37 °C)] 97.6 °F (36.4 °C)  Heart Rate:  [75-91] 91  Resp:  [18] 18  BP: (114-133)/(65-88) 124/69    Intake/Output Summary (Last 24 hours) at 06/24/17 0813  Last data filed at 06/23/17 1945   Gross per 24 hour   Intake              680 ml   Output                0 ml   Net              680 ml     Physical Exam   Constitutional: He is oriented to person, place, and time. He appears well-developed and well-nourished.   HENT:   Head: Normocephalic and atraumatic.   Eyes: Conjunctivae and EOM are normal. Pupils are equal, round, and reactive to light.   Neck: Neck supple. No JVD present. No thyromegaly present.   Cardiovascular: Normal rate, regular rhythm, normal heart sounds and intact distal pulses.  Exam reveals no gallop and no friction rub.    No murmur heard.  Pulmonary/Chest: Effort normal and breath sounds normal. No respiratory distress. He has no wheezes. He has no rales. He exhibits no tenderness.   Abdominal: Soft. Bowel sounds are normal. He exhibits no distension. There is no tenderness. There is no rebound and no guarding.   Musculoskeletal: Normal range of motion. He exhibits no edema, tenderness or deformity.   Lymphadenopathy:     He has no cervical adenopathy.   Neurological: He is alert and oriented to person, place, and time. He displays normal reflexes. No cranial nerve deficit. He exhibits normal muscle tone.   Skin: Skin is warm and dry. No rash noted.   Psychiatric: He has a normal mood and affect. His behavior is normal. Judgment and thought content normal.       Results Review:  Lab Results (last 24 hours)     ** No results found for the last 24 hours. **           Cultures:  Urine Culture   Date Value Ref Range Status   06/14/2017 No growth at 2 days  Final       Radiology Data:     Imaging Results (last 24 hours)     ** No results found for the last 24 hours. **          No Known Allergies    Scheduled meds:     aluminum-magnesium hydroxide-simethicone 10 mL Oral Q6H   famotidine 20 mg Intravenous BID   FLUoxetine 20 mg Oral Daily   gabapentin 300 mg Oral Q8H   IV Fluids 1000 mL + additives 125 mL/hr Intravenous Daily   nicotine 1 patch Transdermal Daily   pantoprazole 40 mg Oral Q AM   QUEtiapine 300 mg Oral Nightly   traMADol 50 mg Oral BID       PRN meds:  HYDROcodone-acetaminophen  •  ondansetron    Assessment/Plan     Active Problems:    * No active hospital problems. *      Plan:  Altered mental status/drug overdose- Ct head, no acute change. Tylenol and salicylate levels - low, ammonia level and lactic acid level- low. Patient threating suicidal. . Suicidal watch for now.       renal failure - resolved      Hyperkalemia- resolved       Chronic pain-put patient back as Ultram and decrease the dose of Norco. Continue Neurontin.      Anemia- S/P transfuse 1 unit of PRBCs. Stable.   Elevated D-dimer. V/Q, low probability.      Alcohol abuse- continue Librium    Anxiety- Prozac.      Diet regular      Discharge Planning: Plan to transfer patient to [facility] behavioral health for suicidal thoughts was supposed to be discharged yesterday but did not have bed plan DC today.    Davie Carl MD   06/24/17   8:13 AM

## 2017-06-24 NOTE — NURSING NOTE
Notified by Lorie cuevas [facility] that patient can be sent via ambulance at 8 am.

## 2017-08-21 ENCOUNTER — HOSPITAL ENCOUNTER (INPATIENT)
Age: 57
LOS: 1 days | Discharge: HOME | DRG: 885 | End: 2017-08-22
Attending: EMERGENCY MEDICINE | Admitting: PSYCHIATRY & NEUROLOGY
Payer: MEDICARE

## 2017-08-21 DIAGNOSIS — F32.A DEPRESSION WITH SUICIDAL IDEATION: Primary | ICD-10-CM

## 2017-08-21 DIAGNOSIS — R45.851 DEPRESSION WITH SUICIDAL IDEATION: Primary | ICD-10-CM

## 2017-08-21 LAB
ALBUMIN SERPL-MCNC: 4.2 G/DL (ref 3.5–5.2)
ALP BLD-CCNC: 94 U/L (ref 40–130)
ALT SERPL-CCNC: 13 U/L (ref 5–41)
AMPHETAMINE SCREEN, URINE: NEGATIVE
ANION GAP SERPL CALCULATED.3IONS-SCNC: 18 MMOL/L (ref 7–19)
AST SERPL-CCNC: 13 U/L (ref 5–40)
BARBITURATE SCREEN URINE: NEGATIVE
BASOPHILS ABSOLUTE: 0 K/UL (ref 0–0.2)
BASOPHILS RELATIVE PERCENT: 0.5 % (ref 0–1)
BENZODIAZEPINE SCREEN, URINE: NEGATIVE
BILIRUB SERPL-MCNC: <0.2 MG/DL (ref 0.2–1.2)
BILIRUBIN URINE: NEGATIVE
BLOOD, URINE: NEGATIVE
BUN BLDV-MCNC: 12 MG/DL (ref 6–20)
CALCIUM SERPL-MCNC: 8.5 MG/DL (ref 8.6–10)
CANNABINOID SCREEN URINE: NEGATIVE
CHLORIDE BLD-SCNC: 99 MMOL/L (ref 98–111)
CLARITY: CLEAR
CO2: 21 MMOL/L (ref 22–29)
COCAINE METABOLITE SCREEN URINE: NEGATIVE
COLOR: YELLOW
CREAT SERPL-MCNC: 1 MG/DL (ref 0.5–1.2)
EOSINOPHILS ABSOLUTE: 0.1 K/UL (ref 0–0.6)
EOSINOPHILS RELATIVE PERCENT: 1.1 % (ref 0–5)
ETHANOL: <10 MG/DL (ref 0–0.08)
GFR NON-AFRICAN AMERICAN: >60
GLUCOSE BLD-MCNC: 90 MG/DL (ref 74–109)
GLUCOSE URINE: NEGATIVE MG/DL
HCT VFR BLD CALC: 32.3 % (ref 42–52)
HEMOGLOBIN: 9.5 G/DL (ref 14–18)
KETONES, URINE: NEGATIVE MG/DL
LEUKOCYTE ESTERASE, URINE: NEGATIVE
LYMPHOCYTES ABSOLUTE: 1.2 K/UL (ref 1.1–4.5)
LYMPHOCYTES RELATIVE PERCENT: 18.6 % (ref 20–40)
Lab: ABNORMAL
MCH RBC QN AUTO: 21.7 PG (ref 27–31)
MCHC RBC AUTO-ENTMCNC: 29.4 G/DL (ref 33–37)
MCV RBC AUTO: 73.7 FL (ref 80–94)
MONOCYTES ABSOLUTE: 0.4 K/UL (ref 0–0.9)
MONOCYTES RELATIVE PERCENT: 5.9 % (ref 0–10)
NEUTROPHILS ABSOLUTE: 4.9 K/UL (ref 1.5–7.5)
NEUTROPHILS RELATIVE PERCENT: 73.3 % (ref 50–65)
NITRITE, URINE: NEGATIVE
OPIATE SCREEN URINE: POSITIVE
PDW BLD-RTO: 20.1 % (ref 11.5–14.5)
PH UA: 6
PLATELET # BLD: 233 K/UL (ref 130–400)
PMV BLD AUTO: 10.4 FL (ref 9.4–12.4)
POTASSIUM SERPL-SCNC: 4.1 MMOL/L (ref 3.5–5)
PROTEIN UA: NEGATIVE MG/DL
RBC # BLD: 4.38 M/UL (ref 4.7–6.1)
SODIUM BLD-SCNC: 138 MMOL/L (ref 136–145)
SPECIFIC GRAVITY UA: 1.01
TOTAL PROTEIN: 7.9 G/DL (ref 6.6–8.7)
UROBILINOGEN, URINE: 0.2 E.U./DL
WBC # BLD: 6.7 K/UL (ref 4.8–10.8)

## 2017-08-21 PROCEDURE — 6370000000 HC RX 637 (ALT 250 FOR IP): Performed by: EMERGENCY MEDICINE

## 2017-08-21 PROCEDURE — 80307 DRUG TEST PRSMV CHEM ANLYZR: CPT

## 2017-08-21 PROCEDURE — 6370000000 HC RX 637 (ALT 250 FOR IP): Performed by: PSYCHIATRY & NEUROLOGY

## 2017-08-21 PROCEDURE — 1240000000 HC EMOTIONAL WELLNESS R&B

## 2017-08-21 PROCEDURE — 85025 COMPLETE CBC W/AUTO DIFF WBC: CPT

## 2017-08-21 PROCEDURE — 80053 COMPREHEN METABOLIC PANEL: CPT

## 2017-08-21 PROCEDURE — 99285 EMERGENCY DEPT VISIT HI MDM: CPT

## 2017-08-21 PROCEDURE — 99284 EMERGENCY DEPT VISIT MOD MDM: CPT | Performed by: NURSE PRACTITIONER

## 2017-08-21 PROCEDURE — 36415 COLL VENOUS BLD VENIPUNCTURE: CPT

## 2017-08-21 PROCEDURE — 6360000002 HC RX W HCPCS: Performed by: NURSE PRACTITIONER

## 2017-08-21 PROCEDURE — G0480 DRUG TEST DEF 1-7 CLASSES: HCPCS

## 2017-08-21 PROCEDURE — 96374 THER/PROPH/DIAG INJ IV PUSH: CPT

## 2017-08-21 RX ORDER — CHLORDIAZEPOXIDE HYDROCHLORIDE 25 MG/1
25 CAPSULE, GELATIN COATED ORAL EVERY 6 HOURS PRN
Status: DISCONTINUED | OUTPATIENT
Start: 2017-08-21 | End: 2017-08-22 | Stop reason: HOSPADM

## 2017-08-21 RX ORDER — ACETAMINOPHEN 325 MG/1
650 TABLET ORAL EVERY 4 HOURS PRN
Status: DISCONTINUED | OUTPATIENT
Start: 2017-08-21 | End: 2017-08-22 | Stop reason: HOSPADM

## 2017-08-21 RX ORDER — LORAZEPAM 2 MG/ML
1 INJECTION INTRAMUSCULAR ONCE
Status: COMPLETED | OUTPATIENT
Start: 2017-08-21 | End: 2017-08-21

## 2017-08-21 RX ORDER — PANTOPRAZOLE SODIUM 40 MG/1
40 TABLET, DELAYED RELEASE ORAL
Status: DISCONTINUED | OUTPATIENT
Start: 2017-08-22 | End: 2017-08-22 | Stop reason: HOSPADM

## 2017-08-21 RX ORDER — FERROUS SULFATE 325(65) MG
325 TABLET ORAL 2 TIMES DAILY WITH MEALS
Status: DISCONTINUED | OUTPATIENT
Start: 2017-08-21 | End: 2017-08-22 | Stop reason: HOSPADM

## 2017-08-21 RX ORDER — ONDANSETRON 4 MG/1
4 TABLET, FILM COATED ORAL EVERY 8 HOURS PRN
Status: DISCONTINUED | OUTPATIENT
Start: 2017-08-21 | End: 2017-08-22 | Stop reason: HOSPADM

## 2017-08-21 RX ORDER — HYDROCODONE BITARTRATE AND ACETAMINOPHEN 10; 325 MG/1; MG/1
1 TABLET ORAL ONCE
Status: COMPLETED | OUTPATIENT
Start: 2017-08-21 | End: 2017-08-21

## 2017-08-21 RX ORDER — DICLOFENAC SODIUM 75 MG/1
75 TABLET, DELAYED RELEASE ORAL 2 TIMES DAILY
Status: ON HOLD | COMMUNITY
End: 2017-12-27 | Stop reason: HOSPADM

## 2017-08-21 RX ORDER — AMITRIPTYLINE HYDROCHLORIDE 10 MG/1
10 TABLET, FILM COATED ORAL 3 TIMES DAILY
Status: DISCONTINUED | OUTPATIENT
Start: 2017-08-21 | End: 2017-08-22 | Stop reason: HOSPADM

## 2017-08-21 RX ORDER — LOPERAMIDE HYDROCHLORIDE 2 MG/1
2 CAPSULE ORAL 4 TIMES DAILY PRN
Status: DISCONTINUED | OUTPATIENT
Start: 2017-08-21 | End: 2017-08-22 | Stop reason: HOSPADM

## 2017-08-21 RX ORDER — IBUPROFEN 600 MG/1
600 TABLET ORAL 3 TIMES DAILY
Status: DISCONTINUED | OUTPATIENT
Start: 2017-08-21 | End: 2017-08-22 | Stop reason: HOSPADM

## 2017-08-21 RX ORDER — GABAPENTIN 300 MG/1
300 CAPSULE ORAL 3 TIMES DAILY
Status: DISCONTINUED | OUTPATIENT
Start: 2017-08-21 | End: 2017-08-22 | Stop reason: HOSPADM

## 2017-08-21 RX ORDER — M-VIT,TX,IRON,MINS/CALC/FOLIC 27MG-0.4MG
1 TABLET ORAL DAILY
Status: DISCONTINUED | OUTPATIENT
Start: 2017-08-21 | End: 2017-08-22 | Stop reason: HOSPADM

## 2017-08-21 RX ORDER — DULOXETIN HYDROCHLORIDE 60 MG/1
60 CAPSULE, DELAYED RELEASE ORAL 2 TIMES DAILY
Status: DISCONTINUED | OUTPATIENT
Start: 2017-08-21 | End: 2017-08-22 | Stop reason: HOSPADM

## 2017-08-21 RX ORDER — LISINOPRIL 20 MG/1
40 TABLET ORAL DAILY
Status: DISCONTINUED | OUTPATIENT
Start: 2017-08-21 | End: 2017-08-22 | Stop reason: HOSPADM

## 2017-08-21 RX ORDER — QUETIAPINE FUMARATE 100 MG/1
100 TABLET, FILM COATED ORAL NIGHTLY
Status: DISCONTINUED | OUTPATIENT
Start: 2017-08-21 | End: 2017-08-22 | Stop reason: HOSPADM

## 2017-08-21 RX ORDER — DICLOFENAC SODIUM 75 MG/1
75 TABLET, DELAYED RELEASE ORAL 2 TIMES DAILY
Status: DISCONTINUED | OUTPATIENT
Start: 2017-08-21 | End: 2017-08-21 | Stop reason: CLARIF

## 2017-08-21 RX ADMIN — LORAZEPAM 1 MG: 2 INJECTION INTRAMUSCULAR; INTRAVENOUS at 13:38

## 2017-08-21 RX ADMIN — AMITRIPTYLINE HYDROCHLORIDE 10 MG: 10 TABLET, FILM COATED ORAL at 20:47

## 2017-08-21 RX ADMIN — MULTIPLE VITAMINS W/ MINERALS TAB 1 TABLET: TAB at 20:47

## 2017-08-21 RX ADMIN — FERROUS SULFATE TAB 325 MG (65 MG ELEMENTAL FE) 325 MG: 325 (65 FE) TAB at 20:47

## 2017-08-21 RX ADMIN — QUETIAPINE FUMARATE 100 MG: 100 TABLET, FILM COATED ORAL at 20:47

## 2017-08-21 RX ADMIN — IBUPROFEN 600 MG: 600 TABLET, FILM COATED ORAL at 20:47

## 2017-08-21 RX ADMIN — GABAPENTIN 300 MG: 300 CAPSULE ORAL at 20:47

## 2017-08-21 RX ADMIN — DULOXETINE HYDROCHLORIDE 60 MG: 60 CAPSULE, DELAYED RELEASE ORAL at 20:47

## 2017-08-21 RX ADMIN — CHLORDIAZEPOXIDE HYDROCHLORIDE 25 MG: 25 CAPSULE ORAL at 20:52

## 2017-08-21 RX ADMIN — HYDROCODONE BITARTRATE AND ACETAMINOPHEN 1 TABLET: 10; 325 TABLET ORAL at 13:51

## 2017-08-21 RX ADMIN — LISINOPRIL 40 MG: 20 TABLET ORAL at 20:47

## 2017-08-21 ASSESSMENT — SLEEP AND FATIGUE QUESTIONNAIRES
AVERAGE NUMBER OF SLEEP HOURS: 6
SLEEP PATTERN: DIFFICULTY FALLING ASLEEP;DISTURBED/INTERRUPTED SLEEP;RESTLESSNESS
DIFFICULTY STAYING ASLEEP: YES
DO YOU USE A SLEEP AID: YES
RESTFUL SLEEP: NO
DIFFICULTY ARISING: NO
DO YOU HAVE DIFFICULTY SLEEPING: YES
DIFFICULTY FALLING ASLEEP: YES

## 2017-08-21 ASSESSMENT — ENCOUNTER SYMPTOMS
DIARRHEA: 0
COUGH: 0
SORE THROAT: 0
WHEEZING: 0
BACK PAIN: 1
EYE DISCHARGE: 0
SHORTNESS OF BREATH: 0
NAUSEA: 0
VOMITING: 0
ABDOMINAL PAIN: 0

## 2017-08-21 ASSESSMENT — LIFESTYLE VARIABLES: HISTORY_ALCOHOL_USE: YES

## 2017-08-21 ASSESSMENT — PAIN SCALES - GENERAL
PAINLEVEL_OUTOF10: 9
PAINLEVEL_OUTOF10: 7

## 2017-08-22 VITALS
HEIGHT: 73 IN | SYSTOLIC BLOOD PRESSURE: 152 MMHG | DIASTOLIC BLOOD PRESSURE: 93 MMHG | TEMPERATURE: 97.9 F | HEART RATE: 79 BPM | WEIGHT: 211 LBS | OXYGEN SATURATION: 95 % | BODY MASS INDEX: 27.96 KG/M2 | RESPIRATION RATE: 18 BRPM

## 2017-08-22 PROBLEM — D50.9 IRON DEFICIENCY ANEMIA: Status: ACTIVE | Noted: 2017-08-22

## 2017-08-22 PROBLEM — I10 ESSENTIAL HYPERTENSION: Status: ACTIVE | Noted: 2017-08-22

## 2017-08-22 PROBLEM — K21.9 GASTROESOPHAGEAL REFLUX DISEASE WITHOUT ESOPHAGITIS: Status: ACTIVE | Noted: 2017-08-22

## 2017-08-22 PROBLEM — F17.200 TOBACCO DEPENDENCE: Status: ACTIVE | Noted: 2017-08-22

## 2017-08-22 PROBLEM — F19.20 POLYSUBSTANCE (INCLUDING OPIOIDS) DEPENDENCE WITH PHYSIOLOGICAL DEPENDENCE (HCC): Status: ACTIVE | Noted: 2017-08-22

## 2017-08-22 PROCEDURE — 6370000000 HC RX 637 (ALT 250 FOR IP): Performed by: PSYCHIATRY & NEUROLOGY

## 2017-08-22 PROCEDURE — 5130000000 HC BRIDGE APPOINTMENT

## 2017-08-22 PROCEDURE — 99239 HOSP IP/OBS DSCHRG MGMT >30: CPT | Performed by: PSYCHIATRY & NEUROLOGY

## 2017-08-22 RX ORDER — NICOTINE 21 MG/24HR
1 PATCH, TRANSDERMAL 24 HOURS TRANSDERMAL DAILY
Status: DISCONTINUED | OUTPATIENT
Start: 2017-08-22 | End: 2017-08-22 | Stop reason: HOSPADM

## 2017-08-22 RX ADMIN — DULOXETINE HYDROCHLORIDE 60 MG: 60 CAPSULE, DELAYED RELEASE ORAL at 08:47

## 2017-08-22 RX ADMIN — AMITRIPTYLINE HYDROCHLORIDE 10 MG: 10 TABLET, FILM COATED ORAL at 08:47

## 2017-08-22 RX ADMIN — MULTIPLE VITAMINS W/ MINERALS TAB 1 TABLET: TAB at 08:48

## 2017-08-22 RX ADMIN — GABAPENTIN 300 MG: 300 CAPSULE ORAL at 08:47

## 2017-08-22 RX ADMIN — ACETAMINOPHEN 650 MG: 325 TABLET, FILM COATED ORAL at 12:09

## 2017-08-22 RX ADMIN — IBUPROFEN 600 MG: 600 TABLET, FILM COATED ORAL at 08:47

## 2017-08-22 RX ADMIN — FERROUS SULFATE TAB 325 MG (65 MG ELEMENTAL FE) 325 MG: 325 (65 FE) TAB at 08:47

## 2017-08-22 RX ADMIN — GABAPENTIN 300 MG: 300 CAPSULE ORAL at 13:24

## 2017-08-22 RX ADMIN — ACETAMINOPHEN 650 MG: 325 TABLET, FILM COATED ORAL at 05:54

## 2017-08-22 RX ADMIN — IBUPROFEN 600 MG: 600 TABLET, FILM COATED ORAL at 13:25

## 2017-08-22 RX ADMIN — CHLORDIAZEPOXIDE HYDROCHLORIDE 25 MG: 25 CAPSULE ORAL at 12:09

## 2017-08-22 RX ADMIN — PANTOPRAZOLE SODIUM 40 MG: 40 TABLET, DELAYED RELEASE ORAL at 06:27

## 2017-08-22 RX ADMIN — ACETAMINOPHEN 650 MG: 325 TABLET, FILM COATED ORAL at 16:29

## 2017-08-22 RX ADMIN — AMITRIPTYLINE HYDROCHLORIDE 10 MG: 10 TABLET, FILM COATED ORAL at 13:25

## 2017-08-22 RX ADMIN — FERROUS SULFATE TAB 325 MG (65 MG ELEMENTAL FE) 325 MG: 325 (65 FE) TAB at 16:28

## 2017-08-22 RX ADMIN — CHLORDIAZEPOXIDE HYDROCHLORIDE 25 MG: 25 CAPSULE ORAL at 05:54

## 2017-08-22 RX ADMIN — LISINOPRIL 40 MG: 20 TABLET ORAL at 08:47

## 2017-08-22 ASSESSMENT — PAIN SCALES - GENERAL
PAINLEVEL_OUTOF10: 8

## 2017-12-26 ENCOUNTER — HOSPITAL ENCOUNTER (INPATIENT)
Age: 57
LOS: 1 days | Discharge: HOME | DRG: 885 | End: 2017-12-27
Attending: EMERGENCY MEDICINE | Admitting: PSYCHIATRY & NEUROLOGY
Payer: MEDICARE

## 2017-12-26 DIAGNOSIS — R45.851 DEPRESSION WITH SUICIDAL IDEATION: ICD-10-CM

## 2017-12-26 DIAGNOSIS — F10.10 ALCOHOL ABUSE: ICD-10-CM

## 2017-12-26 DIAGNOSIS — R45.851 SUICIDAL IDEATION: Primary | ICD-10-CM

## 2017-12-26 DIAGNOSIS — F32.A DEPRESSION WITH SUICIDAL IDEATION: ICD-10-CM

## 2017-12-26 LAB
ACETAMINOPHEN LEVEL: <15 UG/ML
ALBUMIN SERPL-MCNC: 4.7 G/DL (ref 3.5–5.2)
ALP BLD-CCNC: 110 U/L (ref 40–130)
ALT SERPL-CCNC: 14 U/L (ref 5–41)
AMPHETAMINE SCREEN, URINE: NEGATIVE
ANION GAP SERPL CALCULATED.3IONS-SCNC: 20 MMOL/L (ref 7–19)
AST SERPL-CCNC: 13 U/L (ref 5–40)
BACTERIA: NEGATIVE /HPF
BARBITURATE SCREEN URINE: NEGATIVE
BASOPHILS ABSOLUTE: 0 K/UL (ref 0–0.2)
BASOPHILS RELATIVE PERCENT: 0.3 % (ref 0–1)
BENZODIAZEPINE SCREEN, URINE: NEGATIVE
BILIRUB SERPL-MCNC: <0.2 MG/DL (ref 0.2–1.2)
BILIRUBIN URINE: NEGATIVE
BLOOD, URINE: NEGATIVE
BUN BLDV-MCNC: 14 MG/DL (ref 6–20)
CALCIUM SERPL-MCNC: 9.2 MG/DL (ref 8.6–10)
CANNABINOID SCREEN URINE: NEGATIVE
CHLORIDE BLD-SCNC: 102 MMOL/L (ref 98–111)
CLARITY: CLEAR
CO2: 23 MMOL/L (ref 22–29)
COCAINE METABOLITE SCREEN URINE: NEGATIVE
COLOR: YELLOW
CREAT SERPL-MCNC: 0.6 MG/DL (ref 0.5–1.2)
EOSINOPHILS ABSOLUTE: 0.1 K/UL (ref 0–0.6)
EOSINOPHILS RELATIVE PERCENT: 0.8 % (ref 0–5)
EPITHELIAL CELLS, UA: 1 /HPF (ref 0–5)
ETHANOL: 104 MG/DL (ref 0–0.08)
ETHANOL: 65 MG/DL (ref 0–0.08)
GFR NON-AFRICAN AMERICAN: >60
GLUCOSE BLD-MCNC: 101 MG/DL (ref 74–109)
GLUCOSE URINE: NEGATIVE MG/DL
HCT VFR BLD CALC: 42.4 % (ref 42–52)
HEMOGLOBIN: 12.8 G/DL (ref 14–18)
HYALINE CASTS: 5 /HPF (ref 0–8)
KETONES, URINE: NEGATIVE MG/DL
LEUKOCYTE ESTERASE, URINE: NEGATIVE
LYMPHOCYTES ABSOLUTE: 2.6 K/UL (ref 1.1–4.5)
LYMPHOCYTES RELATIVE PERCENT: 16.7 % (ref 20–40)
Lab: ABNORMAL
MCH RBC QN AUTO: 23.5 PG (ref 27–31)
MCHC RBC AUTO-ENTMCNC: 30.2 G/DL (ref 33–37)
MCV RBC AUTO: 77.8 FL (ref 80–94)
MONOCYTES ABSOLUTE: 0.6 K/UL (ref 0–0.9)
MONOCYTES RELATIVE PERCENT: 3.9 % (ref 0–10)
NEUTROPHILS ABSOLUTE: 12.3 K/UL (ref 1.5–7.5)
NEUTROPHILS RELATIVE PERCENT: 77.8 % (ref 50–65)
NITRITE, URINE: NEGATIVE
OPIATE SCREEN URINE: POSITIVE
PDW BLD-RTO: 18.7 % (ref 11.5–14.5)
PH UA: 6
PLATELET # BLD: 458 K/UL (ref 130–400)
PMV BLD AUTO: 9.8 FL (ref 9.4–12.4)
POTASSIUM SERPL-SCNC: 4.2 MMOL/L (ref 3.5–5)
PROTEIN UA: 30 MG/DL
RBC # BLD: 5.45 M/UL (ref 4.7–6.1)
RBC UA: 2 /HPF (ref 0–4)
SALICYLATE, SERUM: <3 MG/DL (ref 3–10)
SODIUM BLD-SCNC: 145 MMOL/L (ref 136–145)
SPECIFIC GRAVITY UA: 1.01
TOTAL PROTEIN: 8.6 G/DL (ref 6.6–8.7)
UROBILINOGEN, URINE: 0.2 E.U./DL
WBC # BLD: 15.8 K/UL (ref 4.8–10.8)
WBC UA: 1 /HPF (ref 0–5)

## 2017-12-26 PROCEDURE — 1240000000 HC EMOTIONAL WELLNESS R&B

## 2017-12-26 PROCEDURE — 99284 EMERGENCY DEPT VISIT MOD MDM: CPT | Performed by: EMERGENCY MEDICINE

## 2017-12-26 PROCEDURE — G0480 DRUG TEST DEF 1-7 CLASSES: HCPCS

## 2017-12-26 PROCEDURE — 99285 EMERGENCY DEPT VISIT HI MDM: CPT

## 2017-12-26 PROCEDURE — 6370000000 HC RX 637 (ALT 250 FOR IP): Performed by: EMERGENCY MEDICINE

## 2017-12-26 PROCEDURE — 80053 COMPREHEN METABOLIC PANEL: CPT

## 2017-12-26 PROCEDURE — 85025 COMPLETE CBC W/AUTO DIFF WBC: CPT

## 2017-12-26 PROCEDURE — 81001 URINALYSIS AUTO W/SCOPE: CPT

## 2017-12-26 PROCEDURE — 36415 COLL VENOUS BLD VENIPUNCTURE: CPT

## 2017-12-26 PROCEDURE — 80307 DRUG TEST PRSMV CHEM ANLYZR: CPT

## 2017-12-26 RX ORDER — LORAZEPAM 1 MG/1
1 TABLET ORAL ONCE
Status: COMPLETED | OUTPATIENT
Start: 2017-12-26 | End: 2017-12-26

## 2017-12-26 RX ORDER — HYDROCODONE BITARTRATE AND ACETAMINOPHEN 10; 325 MG/1; MG/1
1 TABLET ORAL EVERY 8 HOURS PRN
Status: ON HOLD | COMMUNITY
End: 2017-12-27 | Stop reason: HOSPADM

## 2017-12-26 RX ORDER — QUETIAPINE FUMARATE 25 MG/1
300 TABLET, FILM COATED ORAL NIGHTLY
COMMUNITY

## 2017-12-26 RX ORDER — ACETAMINOPHEN 325 MG/1
650 TABLET ORAL EVERY 4 HOURS PRN
Status: DISCONTINUED | OUTPATIENT
Start: 2017-12-26 | End: 2017-12-27 | Stop reason: HOSPADM

## 2017-12-26 RX ORDER — ACETAMINOPHEN 325 MG/1
650 TABLET ORAL ONCE
Status: COMPLETED | OUTPATIENT
Start: 2017-12-26 | End: 2017-12-26

## 2017-12-26 RX ADMIN — LORAZEPAM 1 MG: 1 TABLET ORAL at 22:27

## 2017-12-26 RX ADMIN — ACETAMINOPHEN 650 MG: 325 TABLET, FILM COATED ORAL at 22:26

## 2017-12-26 ASSESSMENT — ENCOUNTER SYMPTOMS
SHORTNESS OF BREATH: 0
ABDOMINAL PAIN: 0
COUGH: 0

## 2017-12-26 ASSESSMENT — PAIN SCALES - GENERAL: PAINLEVEL_OUTOF10: 7

## 2017-12-27 VITALS
WEIGHT: 186.8 LBS | HEIGHT: 73 IN | SYSTOLIC BLOOD PRESSURE: 130 MMHG | RESPIRATION RATE: 20 BRPM | DIASTOLIC BLOOD PRESSURE: 85 MMHG | TEMPERATURE: 98.4 F | OXYGEN SATURATION: 97 % | HEART RATE: 95 BPM | BODY MASS INDEX: 24.76 KG/M2

## 2017-12-27 PROBLEM — F33.9 MAJOR DEPRESSION, RECURRENT (HCC): Status: ACTIVE | Noted: 2017-12-27

## 2017-12-27 PROCEDURE — 5130000000 HC BRIDGE APPOINTMENT

## 2017-12-27 PROCEDURE — 99239 HOSP IP/OBS DSCHRG MGMT >30: CPT | Performed by: PSYCHIATRY & NEUROLOGY

## 2017-12-27 PROCEDURE — 6370000000 HC RX 637 (ALT 250 FOR IP): Performed by: PSYCHIATRY & NEUROLOGY

## 2017-12-27 RX ORDER — QUETIAPINE FUMARATE 25 MG/1
25 TABLET, FILM COATED ORAL ONCE
Status: COMPLETED | OUTPATIENT
Start: 2017-12-27 | End: 2017-12-27

## 2017-12-27 RX ORDER — NICOTINE 21 MG/24HR
1 PATCH, TRANSDERMAL 24 HOURS TRANSDERMAL DAILY
Status: DISCONTINUED | OUTPATIENT
Start: 2017-12-27 | End: 2017-12-27 | Stop reason: HOSPADM

## 2017-12-27 RX ORDER — QUETIAPINE FUMARATE 100 MG/1
100 TABLET, FILM COATED ORAL ONCE
Status: COMPLETED | OUTPATIENT
Start: 2017-12-27 | End: 2017-12-27

## 2017-12-27 RX ADMIN — QUETIAPINE FUMARATE 25 MG: 25 TABLET ORAL at 01:28

## 2017-12-27 RX ADMIN — ACETAMINOPHEN 650 MG: 325 TABLET, FILM COATED ORAL at 08:54

## 2017-12-27 RX ADMIN — ACETAMINOPHEN 650 MG: 325 TABLET, FILM COATED ORAL at 14:01

## 2017-12-27 RX ADMIN — QUETIAPINE FUMARATE 100 MG: 100 TABLET ORAL at 04:08

## 2017-12-27 ASSESSMENT — SLEEP AND FATIGUE QUESTIONNAIRES
RESTFUL SLEEP: NO
SLEEP PATTERN: DIFFICULTY FALLING ASLEEP;RESTLESSNESS;EARLY AWAKENING
AVERAGE NUMBER OF SLEEP HOURS: 4
DO YOU HAVE DIFFICULTY SLEEPING: YES
DO YOU USE A SLEEP AID: YES
DIFFICULTY FALLING ASLEEP: YES
DIFFICULTY STAYING ASLEEP: YES
DIFFICULTY ARISING: NO

## 2017-12-27 ASSESSMENT — PATIENT HEALTH QUESTIONNAIRE - PHQ9: SUM OF ALL RESPONSES TO PHQ QUESTIONS 1-9: 27

## 2017-12-27 ASSESSMENT — PAIN SCALES - GENERAL
PAINLEVEL_OUTOF10: 7
PAINLEVEL_OUTOF10: 8

## 2017-12-27 ASSESSMENT — LIFESTYLE VARIABLES: HISTORY_ALCOHOL_USE: YES
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
## 2017-12-27 NOTE — PROGRESS NOTES
Bridge Appointment completed: Reviewed Discharge Instructions with patient. Patient verbalizes understanding and agreement with the discharge plan using the teachback method. Referral for Outpatient Tobacco Cessation Counseling, upon discharge (jahaira X if applicable and completed):    ( )  Hospital staff assisted patient to call Quit Line or faxed referral                                   during hospitalization                  ( )  Recognizing danger situations (included triggers and roadblocks), if not completed on admission                    ( )  Coping skills (new ways to manage stress, exercise, relaxation techniques, changing routine, distraction), if not completed on admission                                                           ( )  Basic information about quitting (benefits of quitting, techniques in how to quit, available resources, if not completed on admission  ( ) Referral for counseling faxed to [facility]   (x ) Patient refused referral  (x ) Patient refused counseling  (x ) Patient refused smoking cessation medication upon discharge    Vaccinations (jahaira X if applicable and completed):  ( ) Patient states already received influenza vaccine elsewhere  ( ) Patient received influenza vaccine during this hospitalization  (x ) Patient refused influenza vaccine at this time  [address] [place] [address]  Discharge Note    Pt discharged with followings belongings:   Vision - Corrective Lenses: None  Hearing Aid: None  Jewelry: None  Body Piercings Removed: N/A  Clothing: Other (Comment) (see belongings document in chart)  Were All Patient Medications Collected?: Not Applicable  Other Valuables: Other (Comment) (see belongings document in chart)   . t. Patient left department with Departure Mode: Family member via Mobility at Departure: Ambulatory, discharged to Discharged to: Private Residence.  Patient education on aftercare instructions: given  Patient verbalize understanding of

## 2017-12-27 NOTE — PROGRESS NOTES
Group Therapy Note    Date: 12/27/2017  Start Time: 1100  End Time:  1200  Number of Participants: 4    Type of Group: Cognitive Skills    Wellness Binder Information  Module Name:  Stress  Session Number:  5    Patient's Goal:  To improve knowledge of effective stress management techniques    Notes:  Pt did not attend group discussion. Pt was invited/encouraged. Status After Intervention:      Participation Level:     Participation Quality:       Speech:         Thought Process/Content:       Affective Functioning:       Mood:       Level of consciousness:        Response to Learning:       Endings:     Modes of Intervention:       Discipline Responsible:       Signature:  Cheryl Betancourt

## 2017-12-27 NOTE — ED NOTES
Farhana Newman (father)- [phone] cell [phone]    Rodrigomina Vazquez (brother in- law) [phone]     Calixto Ortega  12/26/17 1933

## 2017-12-27 NOTE — PROGRESS NOTES
Group Therapy Note    Date: 12/27/2017  Start Time: 1000  End Time:  1100  Number of Participants: 4    Type of Group: Psychoeducation    Wellness Binder Information  Module Name:  Stress  Session Number:  1    Patient's Goal:  To raise awareness of the sources and signs of stress    Notes:  Pt did not attend group discussion. Pt was invited/encouraged to attend. Status After Intervention:      Participation Level:     Participation Quality:       Speech:         Thought Process/Content:       Affective Functioning:       Mood:       Level of consciousness:        Response to Learning:       Endings:     Modes of Intervention:       Discipline Responsible:       Signature:  Joshua Chirinos

## 2017-12-27 NOTE — PROGRESS NOTES
Discharge Note    Pt discharging on this date. Pt denies SI, HI, and AVH at this time. Pt reports improvement in behavior and is leaving unit in overall good condition. TEMITOPE I and pt discussed pt's follow up appointments and importance of attending appointments as scheduled, pt voiced understanding and agreement. Pt and TEMITOPE I also discussed pt safety plan and pt able to verbally identify: warning signs, coping strategies, places and people that help make the pt feel better/distract negative thoughts, friends/family/agencies/professionals the pt can reach out to in a crisis, and something that is important to the pt/worth living for. Pt provided the national suicide prevention hotline number (1-737-302-882-052-4279) as well as local community behavioral health [facility]) crisis number for emergencies (6-672-722-440-546-7747). Pt to [facility] with [address] [place] [employer]. Therapy appointment 1/3/18 at 7:30 AM with María Elena Martin. Med management appointment  1/8/18 at 8:30 AM with Dr. Corrine Schwab.      Electronically signed by Diana Delgado on 12/27/2017 at 4:41 PM

## 2017-12-27 NOTE — PROGRESS NOTES
Dr Bisi Shelley was called about patient needing his Seroquel for tonight. Home medication list has patient taking Seroquel 25 mg BID. Dr Bisi Shelley gave order to give patient Seroquel 25 mg for tonight. Patient reports he takes Seroquel 300 mg. Patient told that we would have to verify his home medication list with his pharmacy and speak with the doctor in the morning.

## 2017-12-27 NOTE — PROGRESS NOTES
TEMITOPE I completed psychosocial and CSSR-S on this date. Pt long and short term goals discussed. Pt voiced understanding. Treatment sheet signed.     In the last 6 months has the pt been danger to self: Yes  In the last 6 months has the pt been danger to others: No     Provided pt with [employer] handout entitled \"Quitting Smoking,\" reviewed handout with pt addressing dangers of smoking, developing coping skills, and providing basic information about quitting. Patient declined practical counseling of tobacco practical counseling during the hospital stay.      Electronically signed by AnaM aria Barrow on 12/27/2017 at 10:20 AM

## 2017-12-27 NOTE — PROGRESS NOTES
Patient is anxious and has came to the desk a few times. Patient allowed to call home to get Seroquel bottle brought to hospital. Family returned call with bottle in hand and reported patient bottle had Seroquel 300 mg last filled on 11/29/17. Dr. Neena Vaughn called again and told that patient has been anxious and pacing some and we were able to get the dose of the Seroquel from family. Dr. Neena Vaughn gave new order for Seroquel 100 mg. Patient received medication just after 4 am. Patient requested an Ativan. Patient is med focused and seeking more medication. Patient told he had an Ativan in the ER earlier tonight. Patient reports his Seroquel bottle is empty. Patient is currently lying in bed awake.

## 2017-12-27 NOTE — ED PROVIDER NOTES
Procedure Laterality Date    GASTROSTOMY TUBE PLACEMENT Left 2016    PNEUMONIA    TRACHEOSTOMY           CURRENT MEDICATIONS       Current Discharge Medication List      CONTINUE these medications which have NOT CHANGED    Details   HYDROcodone-acetaminophen (NORCO)  MG per tablet Take 1 tablet by mouth every 6 hours as needed for Pain . QUEtiapine (SEROQUEL) 25 MG tablet Take 25 mg by mouth 2 times daily      diclofenac (VOLTAREN) 75 MG EC tablet Take 75 mg by mouth 2 times daily      gabapentin (NEURONTIN) 300 MG capsule Take 300 mg by mouth 3 times daily      amitriptyline (ELAVIL) 10 MG tablet Take 10 mg by mouth 3 times daily      lisinopril (PRINIVIL;ZESTRIL) 40 MG tablet Take 40 mg by mouth daily      omeprazole (PRILOSEC) 20 MG capsule Take 20 mg by mouth 2 times daily              ALLERGIES     Review of patient's allergies indicates no known allergies. FAMILY HISTORY     History reviewed. No pertinent family history.        SOCIAL HISTORY       Social History     Social History    Marital status:      Spouse name: N/A    Number of children: N/A    Years of education: N/A     Occupational History    Disabled      Social History Main Topics    Smoking status: Current Every Day Smoker     Packs/day: 1.00     Years: 30.00     Types: Cigarettes     Start date: 8/21/1997    Smokeless tobacco: None      Comment: Counseling upon discharge    Alcohol use Yes      Comment: OCCASIONALLY    Drug use: Unknown      Comment: Just rx medications    Sexual activity: Not Asked     Other Topics Concern    None     Social History Narrative    None       SCREENINGS    [place] Coma Scale  Eye Opening: Spontaneous  Best Verbal Response: Oriented  Best Motor Response: Obeys commands  Jose Coma Scale Score: 15        PHYSICAL EXAM    (up to 7 for level 4, 8 or more for level 5)     ED Triage Vitals [12/26/17 1802]   BP Temp Temp Source Pulse Resp SpO2 Height Weight   (!) 139/96 97.7 °F (36.5 following: Anion Gap 20 (*)     All other components within normal limits   URINE DRUG SCREEN - Abnormal; Notable for the following:     Opiate Scrn, Ur Positive (*)     All other components within normal limits   URINALYSIS - Abnormal; Notable for the following:     Protein, UA 30 (*)     All other components within normal limits   ETHANOL   MICROSCOPIC URINALYSIS   ETHANOL   ACETAMINOPHEN LEVEL   SALICYLATE       All other labs were within normal range or not returned as of this dictation. EMERGENCY DEPARTMENT COURSE and DIFFERENTIAL DIAGNOSIS/MDM:   Vitals:    Vitals:    12/26/17 1802 12/26/17 1830 12/26/17 2300   BP: (!) 139/96 120/80 139/88   Pulse: 86 98 93   Resp: 18 12 16   Temp: 97.7 °F (36.5 °C) 97.7 °F (36.5 °C) 98.2 °F (36.8 °C)   TempSrc: Temporal Temporal Temporal   SpO2: 97% 98% 93%   Weight:   186 lb 12.8 oz (84.7 kg)   Height:   6' 1\" (1.854 m)       MDM  Number of Diagnoses or Management Options  Alcohol abuse:   Depression with suicidal ideation:   Suicidal ideation:   Diagnosis management comments: Patient with chronic pain issues alcohol abuse issues. Patient suicidal endorsing voluntary admission for help. Patient was seen by Aiden Alonso from psychiatry accepted by Dr. Neena Vaughn. We reviewed the labs. The patient otherwise looks fine. He would like to be admitted. I did give him by mouth Ativan for his anxiety. Patient stable for admission. Amount and/or Complexity of Data Reviewed  Clinical lab tests: ordered and reviewed  Discuss the patient with other providers: yes    Risk of Complications, Morbidity, and/or Mortality  Presenting problems: high  Management options: high    Patient Progress  Patient progress: stable        CONSULTS:  IP CONSULT TO PSYCHIATRY  IP CONSULT TO INTERNAL MEDICINE  IP CONSULT TO SOCIAL WORK    PROCEDURES:  Unless otherwise noted below, none     Procedures    FINAL IMPRESSION      1. Suicidal ideation    2. Alcohol abuse    3.  Depression with suicidal ideation DISPOSITION/PLAN   DISPOSITION Decision To Admit 12/26/2017 10:12:58 PM      PATIENT REFERRED TO:  No follow-up provider specified.     DISCHARGE MEDICATIONS:  Current Discharge Medication List             (Please note that portions of this note were completed with a voice recognition program.  Efforts were made to edit the dictations but occasionally words are mis-transcribed.)    Nathalia Young MD (electronically signed)  Attending Emergency Physician         Nathalia Young MD  12/27/17 0001
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
## 2017-12-27 NOTE — DISCHARGE SUMMARY
that patient could be safely discharged to an outpatient level of care.         Electronically signed by Denisha Hudson MD on 12/27/17 at 1:08 PM

## 2017-12-27 NOTE — PROGRESS NOTES
ADMISSION NOTE:     Pt arrived with  via [facility]. He was calm and cooperative with nursing staff during initial questions and body search and physical that was completed by Ermelinda Nuñez and Rob Montez. He received an ADMISSION QUESTIONNAIRE and MENU to fill out. Pt stated he was tired and would get them as soon as he could. Pt said he did not have a flu or pneumonia vaccine and requested to have it. He said that he gets his medications from [facility]. Pt said he was a smoker. No other complaints at this time.  Electronically signed by Edin Salvador RN on 12/26/2017 at 11:14 PM

## 2017-12-28 NOTE — H&P
HISTORY and PHYSICAL      CHIEF COMPLAINT:  Depression, SI    Reason for Admission:  Depression, SI    History Obtained From:  patient    HISTORY OF PRESENT ILLNESS:      The patient is a 56 y.o. male who is admitted to the [place] [address] unit with worsening mood issues. He has had no c/o CP or SOA. No abdominal pain or N/V. He denies dysuria. He has c/o neck pain, which is chronic. Past Medical History:        Diagnosis Date    Depression     GERD (gastroesophageal reflux disease)     Hypertension     Movement disorder     Neck injuries     Pneumonia      Past Surgical History:        Procedure Laterality Date    GASTROSTOMY TUBE PLACEMENT Left 2016    PNEUMONIA    TRACHEOSTOMY           Medications Prior to Admission:    No prescriptions prior to admission. Allergies:  Review of patient's allergies indicates no known allergies. Social History:   TOBACCO:   reports that he has been smoking Cigarettes. He started smoking about 20 years ago. He has a 30.00 pack-year smoking history. He does not have any smokeless tobacco history on file. ETOH:   reports that he drinks alcohol. DRUGS:   has no drug history on file. MARITAL STATUS:    OCCUPATION:  disabled  Patient currently lives alone      Family History:   History reviewed. No pertinent family history.   REVIEW OF SYSTEMS:  Constitutional: neg  CV: neg  Pulmonary: neg  GI: neg  : neg  Psych: depression, SI  Neuro: neg  Skin: neg  MusculoSkeletal: neck pain  HEENT: neg  Joints: neg    Vitals:  /85   Pulse 95   Temp 98.4 °F (36.9 °C) (Temporal)   Resp 20   Ht 6' 1\" (1.854 m)   Wt 186 lb 12.8 oz (84.7 kg)   SpO2 97%   BMI 24.65 kg/m²     PHYSICAL EXAM:  Gen: NAD, alert  HEENT: WNL  Lymph: no LAD  Neck: no JVD or masses  Chest: CTA bilat  CV: RRR  Abdomen: NT/ND  Extrem: no C/C/E  Neuro: non focal  Skin: no rashes  Joints: no redness    DATA:  I have reviewed the admission

## 2017-12-29 ENCOUNTER — APPOINTMENT (OUTPATIENT)
Dept: GENERAL RADIOLOGY | Age: 57
End: 2017-12-29
Payer: MEDICARE

## 2017-12-29 ENCOUNTER — HOSPITAL ENCOUNTER (OUTPATIENT)
Age: 57
Setting detail: OBSERVATION
Discharge: HOME | End: 2018-01-02
Attending: EMERGENCY MEDICINE | Admitting: HOSPITALIST
Payer: MEDICARE

## 2017-12-29 ENCOUNTER — APPOINTMENT (OUTPATIENT)
Dept: CT IMAGING | Age: 57
End: 2017-12-29
Payer: MEDICARE

## 2017-12-29 DIAGNOSIS — J69.0 ASPIRATION PNEUMONIA OF RIGHT LOWER LOBE, UNSPECIFIED ASPIRATION PNEUMONIA TYPE (HCC): ICD-10-CM

## 2017-12-29 DIAGNOSIS — T40.604A OPIATE OVERDOSE, UNDETERMINED INTENT, INITIAL ENCOUNTER (HCC): Primary | ICD-10-CM

## 2017-12-29 PROBLEM — J18.9 PNEUMONIA: Status: ACTIVE | Noted: 2017-12-29

## 2017-12-29 LAB
ACETAMINOPHEN LEVEL: <15 UG/ML
ALBUMIN SERPL-MCNC: 4 G/DL (ref 3.5–5.2)
ALP BLD-CCNC: 85 U/L (ref 40–130)
ALT SERPL-CCNC: 16 U/L (ref 5–41)
AMPHETAMINE SCREEN, URINE: NEGATIVE
ANION GAP SERPL CALCULATED.3IONS-SCNC: 15 MMOL/L (ref 7–19)
AST SERPL-CCNC: 27 U/L (ref 5–40)
BACTERIA: NEGATIVE /HPF
BARBITURATE SCREEN URINE: NEGATIVE
BASE EXCESS ARTERIAL: -3.8 MMOL/L (ref -2–2)
BASOPHILS ABSOLUTE: 0 K/UL (ref 0–0.2)
BASOPHILS RELATIVE PERCENT: 0.2 % (ref 0–1)
BENZODIAZEPINE SCREEN, URINE: NEGATIVE
BILIRUB SERPL-MCNC: 0.4 MG/DL (ref 0.2–1.2)
BILIRUBIN URINE: NEGATIVE
BLOOD, URINE: ABNORMAL
BUN BLDV-MCNC: 37 MG/DL (ref 6–20)
CALCIUM SERPL-MCNC: 9.1 MG/DL (ref 8.6–10)
CANNABINOID SCREEN URINE: NEGATIVE
CARBOXYHEMOGLOBIN ARTERIAL: 1.8 % (ref 0–5)
CHLORIDE BLD-SCNC: 100 MMOL/L (ref 98–111)
CLARITY: ABNORMAL
CO2: 25 MMOL/L (ref 22–29)
COCAINE METABOLITE SCREEN URINE: NEGATIVE
COLOR: YELLOW
CREAT SERPL-MCNC: 2 MG/DL (ref 0.5–1.2)
EOSINOPHILS ABSOLUTE: 0 K/UL (ref 0–0.6)
EOSINOPHILS RELATIVE PERCENT: 0 % (ref 0–5)
ETHANOL: <10 MG/DL (ref 0–0.08)
GFR NON-AFRICAN AMERICAN: 35
GLUCOSE BLD-MCNC: 121 MG/DL (ref 74–109)
GLUCOSE BLD-MCNC: 147 MG/DL (ref 70–99)
GLUCOSE URINE: NEGATIVE MG/DL
HCO3 ARTERIAL: 20.4 MMOL/L (ref 22–26)
HCT VFR BLD CALC: 33.5 % (ref 42–52)
HEMOGLOBIN, ART, EXTENDED: 10.4 G/DL (ref 14–18)
HEMOGLOBIN: 10.3 G/DL (ref 14–18)
KETONES, URINE: NEGATIVE MG/DL
LEUKOCYTE ESTERASE, URINE: NEGATIVE
LYMPHOCYTES ABSOLUTE: 0.5 K/UL (ref 1.1–4.5)
LYMPHOCYTES RELATIVE PERCENT: 3.1 % (ref 20–40)
Lab: ABNORMAL
MCH RBC QN AUTO: 24 PG (ref 27–31)
MCHC RBC AUTO-ENTMCNC: 30.7 G/DL (ref 33–37)
MCV RBC AUTO: 77.9 FL (ref 80–94)
METHEMOGLOBIN ARTERIAL: 0.4 %
MONOCYTES ABSOLUTE: 0.5 K/UL (ref 0–0.9)
MONOCYTES RELATIVE PERCENT: 3 % (ref 0–10)
NEUTROPHILS ABSOLUTE: 14.5 K/UL (ref 1.5–7.5)
NEUTROPHILS RELATIVE PERCENT: 93.1 % (ref 50–65)
NITRITE, URINE: NEGATIVE
O2 CONTENT ARTERIAL: 14 ML/DL
O2 SAT, ARTERIAL: 95 %
O2 THERAPY: ABNORMAL
OPIATE SCREEN URINE: POSITIVE
PCO2 ARTERIAL: 33 MMHG (ref 35–45)
PDW BLD-RTO: 18.2 % (ref 11.5–14.5)
PERFORMED ON: ABNORMAL
PH ARTERIAL: 7.4 (ref 7.35–7.45)
PH UA: 6
PLATELET # BLD: 281 K/UL (ref 130–400)
PMV BLD AUTO: 10.1 FL (ref 9.4–12.4)
PO2 ARTERIAL: 88 MMHG (ref 80–100)
POTASSIUM SERPL-SCNC: 4.5 MMOL/L (ref 3.5–5)
POTASSIUM, WHOLE BLOOD: 4.2
PRO-BNP: 96 PG/ML (ref 0–900)
PROTEIN UA: 30 MG/DL
RBC # BLD: 4.3 M/UL (ref 4.7–6.1)
SALICYLATE, SERUM: <3 MG/DL (ref 3–10)
SODIUM BLD-SCNC: 140 MMOL/L (ref 136–145)
SPECIFIC GRAVITY UA: 1.02
TOTAL PROTEIN: 7.3 G/DL (ref 6.6–8.7)
UROBILINOGEN, URINE: 0.2 E.U./DL
WBC # BLD: 15.6 K/UL (ref 4.8–10.8)
WBC UA: NORMAL /HPF (ref 0–5)

## 2017-12-29 PROCEDURE — 93005 ELECTROCARDIOGRAM TRACING: CPT

## 2017-12-29 PROCEDURE — 2580000003 HC RX 258: Performed by: INTERNAL MEDICINE

## 2017-12-29 PROCEDURE — 36415 COLL VENOUS BLD VENIPUNCTURE: CPT

## 2017-12-29 PROCEDURE — G0378 HOSPITAL OBSERVATION PER HR: HCPCS

## 2017-12-29 PROCEDURE — 96376 TX/PRO/DX INJ SAME DRUG ADON: CPT

## 2017-12-29 PROCEDURE — 99223 1ST HOSP IP/OBS HIGH 75: CPT | Performed by: INTERNAL MEDICINE

## 2017-12-29 PROCEDURE — 96366 THER/PROPH/DIAG IV INF ADDON: CPT

## 2017-12-29 PROCEDURE — 81001 URINALYSIS AUTO W/SCOPE: CPT

## 2017-12-29 PROCEDURE — 82948 REAGENT STRIP/BLOOD GLUCOSE: CPT

## 2017-12-29 PROCEDURE — G0480 DRUG TEST DEF 1-7 CLASSES: HCPCS

## 2017-12-29 PROCEDURE — 96365 THER/PROPH/DIAG IV INF INIT: CPT

## 2017-12-29 PROCEDURE — 6360000002 HC RX W HCPCS: Performed by: INTERNAL MEDICINE

## 2017-12-29 PROCEDURE — 96367 TX/PROPH/DG ADDL SEQ IV INF: CPT

## 2017-12-29 PROCEDURE — 82803 BLOOD GASES ANY COMBINATION: CPT

## 2017-12-29 PROCEDURE — 87040 BLOOD CULTURE FOR BACTERIA: CPT

## 2017-12-29 PROCEDURE — 2000000000 HC ICU R&B

## 2017-12-29 PROCEDURE — 99291 CRITICAL CARE FIRST HOUR: CPT | Performed by: EMERGENCY MEDICINE

## 2017-12-29 PROCEDURE — 2580000003 HC RX 258: Performed by: EMERGENCY MEDICINE

## 2017-12-29 PROCEDURE — 36600 WITHDRAWAL OF ARTERIAL BLOOD: CPT

## 2017-12-29 PROCEDURE — 84132 ASSAY OF SERUM POTASSIUM: CPT

## 2017-12-29 PROCEDURE — 87070 CULTURE OTHR SPECIMN AEROBIC: CPT

## 2017-12-29 PROCEDURE — 96375 TX/PRO/DX INJ NEW DRUG ADDON: CPT

## 2017-12-29 PROCEDURE — 6360000002 HC RX W HCPCS: Performed by: EMERGENCY MEDICINE

## 2017-12-29 PROCEDURE — 2500000003 HC RX 250 WO HCPCS: Performed by: INTERNAL MEDICINE

## 2017-12-29 PROCEDURE — 2700000000 HC OXYGEN THERAPY PER DAY

## 2017-12-29 PROCEDURE — 71010 XR CHEST PORTABLE: CPT

## 2017-12-29 PROCEDURE — 96374 THER/PROPH/DIAG INJ IV PUSH: CPT

## 2017-12-29 PROCEDURE — 85025 COMPLETE CBC W/AUTO DIFF WBC: CPT

## 2017-12-29 PROCEDURE — 99291 CRITICAL CARE FIRST HOUR: CPT

## 2017-12-29 PROCEDURE — 80053 COMPREHEN METABOLIC PANEL: CPT

## 2017-12-29 PROCEDURE — 83880 ASSAY OF NATRIURETIC PEPTIDE: CPT

## 2017-12-29 PROCEDURE — 80307 DRUG TEST PRSMV CHEM ANLYZR: CPT

## 2017-12-29 PROCEDURE — 70450 CT HEAD/BRAIN W/O DYE: CPT

## 2017-12-29 RX ORDER — SODIUM CHLORIDE 0.9 % (FLUSH) 0.9 %
10 SYRINGE (ML) INJECTION PRN
Status: DISCONTINUED | OUTPATIENT
Start: 2017-12-29 | End: 2018-01-02 | Stop reason: HOSPADM

## 2017-12-29 RX ORDER — NALOXONE HYDROCHLORIDE 0.4 MG/ML
INJECTION, SOLUTION INTRAMUSCULAR; INTRAVENOUS; SUBCUTANEOUS DAILY PRN
Status: COMPLETED | OUTPATIENT
Start: 2017-12-29 | End: 2017-12-29

## 2017-12-29 RX ORDER — SODIUM CHLORIDE 9 MG/ML
INJECTION, SOLUTION INTRAVENOUS CONTINUOUS
Status: DISCONTINUED | OUTPATIENT
Start: 2017-12-29 | End: 2017-12-29 | Stop reason: SDUPTHER

## 2017-12-29 RX ORDER — SODIUM CHLORIDE 0.9 % (FLUSH) 0.9 %
10 SYRINGE (ML) INJECTION EVERY 12 HOURS SCHEDULED
Status: DISCONTINUED | OUTPATIENT
Start: 2017-12-29 | End: 2018-01-02 | Stop reason: HOSPADM

## 2017-12-29 RX ORDER — NALOXONE HYDROCHLORIDE 0.4 MG/ML
0.4 INJECTION, SOLUTION INTRAMUSCULAR; INTRAVENOUS; SUBCUTANEOUS ONCE
Status: COMPLETED | OUTPATIENT
Start: 2017-12-29 | End: 2017-12-29

## 2017-12-29 RX ORDER — ONDANSETRON 2 MG/ML
4 INJECTION INTRAMUSCULAR; INTRAVENOUS EVERY 6 HOURS PRN
Status: DISCONTINUED | OUTPATIENT
Start: 2017-12-29 | End: 2018-01-02 | Stop reason: HOSPADM

## 2017-12-29 RX ORDER — ACETAMINOPHEN 325 MG/1
650 TABLET ORAL EVERY 4 HOURS PRN
Status: DISCONTINUED | OUTPATIENT
Start: 2017-12-29 | End: 2018-01-02 | Stop reason: HOSPADM

## 2017-12-29 RX ORDER — SODIUM CHLORIDE 9 MG/ML
INJECTION, SOLUTION INTRAVENOUS CONTINUOUS
Status: DISCONTINUED | OUTPATIENT
Start: 2017-12-29 | End: 2018-01-02 | Stop reason: HOSPADM

## 2017-12-29 RX ORDER — DOCUSATE SODIUM 100 MG/1
100 CAPSULE, LIQUID FILLED ORAL 2 TIMES DAILY
Status: DISCONTINUED | OUTPATIENT
Start: 2017-12-29 | End: 2018-01-02 | Stop reason: HOSPADM

## 2017-12-29 RX ADMIN — SODIUM CHLORIDE: 9 INJECTION, SOLUTION INTRAVENOUS at 20:31

## 2017-12-29 RX ADMIN — NALXONE HYDROCHLORIDE 0.4 MG: 0.4 INJECTION INTRAMUSCULAR; INTRAVENOUS; SUBCUTANEOUS at 16:46

## 2017-12-29 RX ADMIN — SODIUM CHLORIDE: 9 INJECTION, SOLUTION INTRAVENOUS at 13:27

## 2017-12-29 RX ADMIN — Medication 10 ML: at 21:30

## 2017-12-29 RX ADMIN — TAZOBACTAM SODIUM AND PIPERACILLIN SODIUM 3.38 G: 375; 3 INJECTION, SOLUTION INTRAVENOUS at 23:45

## 2017-12-29 RX ADMIN — NALXONE HYDROCHLORIDE 0.4 MG: 0.4 INJECTION INTRAMUSCULAR; INTRAVENOUS; SUBCUTANEOUS at 18:16

## 2017-12-29 RX ADMIN — SODIUM CHLORIDE: 9 INJECTION, SOLUTION INTRAVENOUS at 21:35

## 2017-12-29 RX ADMIN — NALOXONE HYDROCHLORIDE 0.4 MG: 0.4 INJECTION, SOLUTION INTRAMUSCULAR; INTRAVENOUS; SUBCUTANEOUS at 13:21

## 2017-12-29 RX ADMIN — AZITHROMYCIN MONOHYDRATE 500 MG: 500 INJECTION, POWDER, LYOPHILIZED, FOR SOLUTION INTRAVENOUS at 22:38

## 2017-12-29 NOTE — ED NOTES
Patient moaning and trying to move himself up in the bed during straight cath procedure.       Shae Arias RN  12/29/17 4553

## 2017-12-29 NOTE — ED NOTES
Patient opened eyes after narcan was given. I asked if he could tell me his name, her stated \"no\". He began moving around in the bed. Patient has fallen back asleep at this time.       Sarahy Morris RN  12/29/17 0842

## 2017-12-29 NOTE — PROGRESS NOTES
Blood Gas, Arterial [[id number]] (Abnormal) Collected: 12/29/17 1342      Specimen: Blood from Blood gases Updated: 12/29/17 1348      pH, Arterial 7.400      pCO2, Arterial 33.0 (L) mmHg       pO2, Arterial 88.0 mmHg       HCO3, Arterial 20.4 (L) mmol/L       Base Excess, Arterial -3.8 (L) mmol/L       Hemoglobin, Art, Extended 10.4 (L) g/dL       O2 Sat, Arterial 95.0 %       Carboxyhgb, Arterial 1.8 %       Methemoglobin, Arterial 0.4 %       O2 Content, Arterial 14.0 mL/dL       O2 Therapy Unknown     Potassium, Whole Blood [[id number]] Collected: 12/29/17 1342      Updated: 12/29/17 1348      Potassium, Whole Blood 4.2         Patient on 4 lpm nasal cannula. RR 27.   Site of choicr right radial.

## 2017-12-29 NOTE — ED PROVIDER NOTES
[facility]  eMERGENCY dEPARTMENT eNCOUnter      Pt Name: Mati Hernandez  MRN: [id number]  [place] 1/1/1948  Date of evaluation: 12/29/2017  Provider: Magan Horan MD    CHIEF COMPLAINT       Chief Complaint   Patient presents with    Drug Overdose     unknown substance         HISTORY OF PRESENT ILLNESS   (Location/Symptom, Timing/Onset, Context/Setting, Quality, Duration, Modifying Factors, Severity)  Note limiting factors. Mati Hernandez is a 81 y.o. male who presents to the emergency department      The history is provided by the EMS personnel. The history is limited by the condition of the patient. Altered Mental Status   Presenting symptoms: lethargy and partial responsiveness    Severity:  Severe  Most recent episode: unknown. Timing:  Constant  Progression:  Unchanged  Chronicity:  New  Context comment:  Found in chair by ems.  known use of hydrocodone. Associated symptoms comment:  Unavailable      Nursing Notes were reviewed. REVIEW OF SYSTEMS    (2-9 systems for level 4, 10 or more for level 5)     Review of Systems   Unable to perform ROS: Mental status change   All other systems reviewed and are negative. Except as noted above the remainder of the review of systems was reviewed and negative. PAST MEDICAL HISTORY   No past medical history on file. SURGICAL HISTORY     No past surgical history on file. CURRENT MEDICATIONS       Previous Medications    No medications on file       ALLERGIES     Review of patient's allergies indicates not on file. FAMILY HISTORY     No family history on file.        SOCIAL HISTORY       Social History     Social History    Marital status:      Spouse name: N/A    Number of children: N/A    Years of education: N/A     Social History Main Topics    Smoking status: Not on file    Smokeless tobacco: Not on file    Alcohol use Not on file    Drug use: Unknown    Sexual activity: Not on file     Other Topics Concern    Not

## 2017-12-30 PROBLEM — E78.5 HYPERLIPIDEMIA: Status: ACTIVE | Noted: 2017-12-30

## 2017-12-30 PROBLEM — T50.901A OVERDOSE: Status: ACTIVE | Noted: 2017-12-30

## 2017-12-30 PROBLEM — I10 HYPERTENSION: Status: ACTIVE | Noted: 2017-12-30

## 2017-12-30 PROBLEM — G92.9 TOXIC ENCEPHALOPATHY: Status: ACTIVE | Noted: 2017-12-30

## 2017-12-30 LAB
ANION GAP SERPL CALCULATED.3IONS-SCNC: 16 MMOL/L (ref 7–19)
BASOPHILS ABSOLUTE: 0 K/UL (ref 0–0.2)
BASOPHILS RELATIVE PERCENT: 0.2 % (ref 0–1)
BUN BLDV-MCNC: 33 MG/DL (ref 6–20)
CALCIUM SERPL-MCNC: 8.4 MG/DL (ref 8.6–10)
CHLORIDE BLD-SCNC: 106 MMOL/L (ref 98–111)
CO2: 19 MMOL/L (ref 22–29)
CREAT SERPL-MCNC: 1.2 MG/DL (ref 0.5–1.2)
EOSINOPHILS ABSOLUTE: 0.1 K/UL (ref 0–0.6)
EOSINOPHILS RELATIVE PERCENT: 0.6 % (ref 0–5)
GFR NON-AFRICAN AMERICAN: >60
GLUCOSE BLD-MCNC: 86 MG/DL (ref 74–109)
HCT VFR BLD CALC: 35.1 % (ref 42–52)
HEMOGLOBIN: 10 G/DL (ref 14–18)
LACTIC ACID: 0.8 MMOL/L (ref 0.5–1.9)
LYMPHOCYTES ABSOLUTE: 1.6 K/UL (ref 1.1–4.5)
LYMPHOCYTES RELATIVE PERCENT: 15.9 % (ref 20–40)
MCH RBC QN AUTO: 23.6 PG (ref 27–31)
MCHC RBC AUTO-ENTMCNC: 28.5 G/DL (ref 33–37)
MCV RBC AUTO: 82.8 FL (ref 80–94)
MONOCYTES ABSOLUTE: 0.5 K/UL (ref 0–0.9)
MONOCYTES RELATIVE PERCENT: 5.2 % (ref 0–10)
NEUTROPHILS ABSOLUTE: 8 K/UL (ref 1.5–7.5)
NEUTROPHILS RELATIVE PERCENT: 77.9 % (ref 50–65)
PDW BLD-RTO: 18.7 % (ref 11.5–14.5)
PLATELET # BLD: 242 K/UL (ref 130–400)
PMV BLD AUTO: 10.5 FL (ref 9.4–12.4)
POTASSIUM SERPL-SCNC: 3.9 MMOL/L (ref 3.5–5)
RBC # BLD: 4.24 M/UL (ref 4.7–6.1)
SODIUM BLD-SCNC: 141 MMOL/L (ref 136–145)
WBC # BLD: 10.2 K/UL (ref 4.8–10.8)

## 2017-12-30 PROCEDURE — 2580000003 HC RX 258: Performed by: INTERNAL MEDICINE

## 2017-12-30 PROCEDURE — 99233 SBSQ HOSP IP/OBS HIGH 50: CPT | Performed by: HOSPITALIST

## 2017-12-30 PROCEDURE — 6370000000 HC RX 637 (ALT 250 FOR IP): Performed by: INTERNAL MEDICINE

## 2017-12-30 PROCEDURE — 1210000000 HC MED SURG R&B

## 2017-12-30 PROCEDURE — 96375 TX/PRO/DX INJ NEW DRUG ADDON: CPT

## 2017-12-30 PROCEDURE — 6360000002 HC RX W HCPCS: Performed by: INTERNAL MEDICINE

## 2017-12-30 PROCEDURE — G0378 HOSPITAL OBSERVATION PER HR: HCPCS

## 2017-12-30 PROCEDURE — 36415 COLL VENOUS BLD VENIPUNCTURE: CPT

## 2017-12-30 PROCEDURE — 96376 TX/PRO/DX INJ SAME DRUG ADON: CPT

## 2017-12-30 PROCEDURE — 96366 THER/PROPH/DIAG IV INF ADDON: CPT

## 2017-12-30 PROCEDURE — 80048 BASIC METABOLIC PNL TOTAL CA: CPT

## 2017-12-30 PROCEDURE — 83605 ASSAY OF LACTIC ACID: CPT

## 2017-12-30 PROCEDURE — 2500000003 HC RX 250 WO HCPCS: Performed by: INTERNAL MEDICINE

## 2017-12-30 PROCEDURE — 6370000000 HC RX 637 (ALT 250 FOR IP): Performed by: HOSPITALIST

## 2017-12-30 PROCEDURE — 87040 BLOOD CULTURE FOR BACTERIA: CPT

## 2017-12-30 PROCEDURE — 2700000000 HC OXYGEN THERAPY PER DAY

## 2017-12-30 PROCEDURE — 96372 THER/PROPH/DIAG INJ SC/IM: CPT

## 2017-12-30 PROCEDURE — 85025 COMPLETE CBC W/AUTO DIFF WBC: CPT

## 2017-12-30 RX ORDER — LISINOPRIL 20 MG/1
40 TABLET ORAL DAILY
Status: DISCONTINUED | OUTPATIENT
Start: 2017-12-30 | End: 2018-01-02 | Stop reason: HOSPADM

## 2017-12-30 RX ORDER — MAGNESIUM HYDROXIDE/ALUMINUM HYDROXICE/SIMETHICONE 120; 1200; 1200 MG/30ML; MG/30ML; MG/30ML
30 SUSPENSION ORAL EVERY 6 HOURS PRN
Status: DISCONTINUED | OUTPATIENT
Start: 2017-12-30 | End: 2018-01-02 | Stop reason: HOSPADM

## 2017-12-30 RX ORDER — QUETIAPINE FUMARATE 300 MG/1
300 TABLET, FILM COATED ORAL DAILY
Status: DISCONTINUED | OUTPATIENT
Start: 2017-12-30 | End: 2017-12-31

## 2017-12-30 RX ORDER — LISINOPRIL 20 MG/1
40 TABLET ORAL DAILY
COMMUNITY

## 2017-12-30 RX ORDER — QUETIAPINE FUMARATE 300 MG/1
300 TABLET, FILM COATED ORAL DAILY
Status: ON HOLD | COMMUNITY
End: 2018-01-02 | Stop reason: HOSPADM

## 2017-12-30 RX ORDER — HYDROCODONE BITARTRATE AND ACETAMINOPHEN 10; 325 MG/1; MG/1
1 TABLET ORAL 3 TIMES DAILY PRN
Status: DISCONTINUED | OUTPATIENT
Start: 2017-12-30 | End: 2018-01-02 | Stop reason: HOSPADM

## 2017-12-30 RX ORDER — HYDROCODONE BITARTRATE AND ACETAMINOPHEN 10; 325 MG/1; MG/1
1 TABLET ORAL 3 TIMES DAILY PRN
Status: ON HOLD | COMMUNITY
End: 2018-01-02 | Stop reason: HOSPADM

## 2017-12-30 RX ADMIN — ONDANSETRON 4 MG: 2 INJECTION INTRAMUSCULAR; INTRAVENOUS at 16:04

## 2017-12-30 RX ADMIN — ACETAMINOPHEN 650 MG: 325 TABLET, FILM COATED ORAL at 00:42

## 2017-12-30 RX ADMIN — TAZOBACTAM SODIUM AND PIPERACILLIN SODIUM 3.38 G: 375; 3 INJECTION, SOLUTION INTRAVENOUS at 22:33

## 2017-12-30 RX ADMIN — ONDANSETRON 4 MG: 2 INJECTION INTRAMUSCULAR; INTRAVENOUS at 08:18

## 2017-12-30 RX ADMIN — ALUMINUM HYDROXIDE, MAGNESIUM HYDROXIDE, AND SIMETHICONE 30 ML: 200; 200; 20 SUSPENSION ORAL at 16:04

## 2017-12-30 RX ADMIN — SODIUM CHLORIDE: 9 INJECTION, SOLUTION INTRAVENOUS at 21:27

## 2017-12-30 RX ADMIN — ALUMINUM HYDROXIDE, MAGNESIUM HYDROXIDE, AND SIMETHICONE 30 ML: 200; 200; 20 SUSPENSION ORAL at 22:52

## 2017-12-30 RX ADMIN — LISINOPRIL 40 MG: 20 TABLET ORAL at 14:24

## 2017-12-30 RX ADMIN — SODIUM CHLORIDE: 9 INJECTION, SOLUTION INTRAVENOUS at 08:18

## 2017-12-30 RX ADMIN — ALUMINUM HYDROXIDE, MAGNESIUM HYDROXIDE, AND SIMETHICONE 30 ML: 200; 200; 20 SUSPENSION ORAL at 11:36

## 2017-12-30 RX ADMIN — HYDROCODONE BITARTRATE AND ACETAMINOPHEN 1 TABLET: 10; 325 TABLET ORAL at 14:24

## 2017-12-30 RX ADMIN — DOCUSATE SODIUM 100 MG: 100 CAPSULE, LIQUID FILLED ORAL at 08:18

## 2017-12-30 RX ADMIN — AZITHROMYCIN MONOHYDRATE 500 MG: 500 INJECTION, POWDER, LYOPHILIZED, FOR SOLUTION INTRAVENOUS at 21:27

## 2017-12-30 RX ADMIN — TAZOBACTAM SODIUM AND PIPERACILLIN SODIUM 3.38 G: 375; 3 INJECTION, SOLUTION INTRAVENOUS at 14:24

## 2017-12-30 RX ADMIN — HYDROCODONE BITARTRATE AND ACETAMINOPHEN 1 TABLET: 10; 325 TABLET ORAL at 21:27

## 2017-12-30 RX ADMIN — QUETIAPINE FUMARATE 300 MG: 300 TABLET, FILM COATED ORAL at 14:24

## 2017-12-30 RX ADMIN — TAZOBACTAM SODIUM AND PIPERACILLIN SODIUM 3.38 G: 375; 3 INJECTION, SOLUTION INTRAVENOUS at 06:50

## 2017-12-30 RX ADMIN — ENOXAPARIN SODIUM 40 MG: 40 INJECTION SUBCUTANEOUS at 08:18

## 2017-12-30 RX ADMIN — DOCUSATE SODIUM 100 MG: 100 CAPSULE, LIQUID FILLED ORAL at 21:27

## 2017-12-30 ASSESSMENT — PAIN SCALES - GENERAL
PAINLEVEL_OUTOF10: 8
PAINLEVEL_OUTOF10: 4
PAINLEVEL_OUTOF10: 0
PAINLEVEL_OUTOF10: 3
PAINLEVEL_OUTOF10: 5
PAINLEVEL_OUTOF10: 9
PAINLEVEL_OUTOF10: 0
PAINLEVEL_OUTOF10: 2

## 2017-12-30 ASSESSMENT — PAIN DESCRIPTION - PAIN TYPE: TYPE: ACUTE PAIN

## 2017-12-30 NOTE — PLAN OF CARE
Problem: Falls - Risk of  Goal: Absence of falls  Outcome: Met This Shift      Problem: Restraint Use - Nonviolent/Non-Self-Destructive Behavior:  Goal: Absence of restraint indications  Absence of restraint indications   Outcome: Completed Date Met: 12/30/17    Goal: Absence of restraint-related injury  Absence of restraint-related injury   Outcome: Completed Date Met: 12/30/17      Problem: Pain:  Goal: Pain level will decrease  Pain level will decrease   Outcome: Met This Shift    Goal: Control of acute pain  Control of acute pain   Outcome: Met This Shift    Goal: Control of chronic pain  Control of chronic pain   Outcome: Met This Shift

## 2017-12-30 NOTE — PROGRESS NOTES
Pt's mother at bedside. Mother indicated that pt was found with agonal resp at his camper which is next to her house. EMS was called who treated and  Brought pt to [facility]'s ER. Eder Sánchez According to mother, an empty bottle of Seroquel was found in pt's camper and that the prescription was filled on Tuesday. She said the pt had told her that he took 3 or 4 Seroquel with alcohol to commit suicide because he has become too much trouble for his parents who has to now take care of him. Pt said the rest of the Seroquel were discarded [place] his [place]. According to mother, this is pt's third attempt of suicide this year and the previous one was about 2 weeks ago.

## 2017-12-30 NOTE — ED NOTES
Patient sleeping at this time after another dose of narcan. Patient will awaken ith loud speaking and sternal rubbing for a short period of time. One on one sitter at bedside.       [address] [facility], [address] [place] [address]  12/29/17 5766

## 2017-12-30 NOTE — H&P
Normal respiratory effort. Clear to auscultation, bilaterally without Rales/Wheezes/Rhonchi. Cardiovascular:  Regular rate and rhythm with normal S1/S2 without murmurs, rubs or gallops. Abdomen: Soft, non-tender, non-distended with normal bowel sounds. Musculoskeletal:  No clubbing, cyanosis or edema bilaterally. Full range of motion without deformity. Skin: Skin color, texture, turgor normal.  No rashes or lesions. Neurologic: Semicomatose. Neurovascularly difficult to test. grossly non-focal.  Psychiatric:  Alert and oriented, thought content appropriate, normal insight  Capillary Refill: Brisk,< 3 seconds   Peripheral Pulses: +2 palpable, equal bilaterally       Labs:     Recent Labs      12/29/17   1500   WBC  15.6*   HGB  10.3*   HCT  33.5*   PLT  281     Recent Labs      12/29/17   1342  12/29/17   1500   NA   --   140   K  4.2  4.5   CL   --   100   CO2   --   25   BUN   --   37*   CREATININE   --   2.0*   CALCIUM   --   9.1     Recent Labs      12/29/17   1500   AST  27   ALT  16   BILITOT  0.4   ALKPHOS  85     No results for input(s): INR in the last 72 hours. No results for input(s): Luane Frederick in the last 72 hours. Urinalysis:      Lab Results   Component Value Date    NITRU Negative 12/29/2017    WBCUA 0-1 12/29/2017    BACTERIA NEGATIVE 12/29/2017    BLOODU LARGE 12/29/2017    SPECGRAV 1.023 12/29/2017    GLUCOSEU Negative 12/29/2017       Radiology:     CXR: I have reviewed the CXR with the following interpretation:   Lungs are hypoaerated, there is mild central vascular crowding and   there is patchy basilar atelectasis. There is small infiltrate in the   right subhilar region, early pneumonia related to aspiration is not   excluded. No pneumothorax or pleural effusion is identified. Heart   size is normal. There is ectasia of the thoracic aorta. The osseous   structures are unremarkable     FINDINGS: There are no hemorrhage, edema or mass effect.  There is a   small chronic

## 2017-12-30 NOTE — PROGRESS NOTES
mL/hr at 12/30/17 0818      sodium chloride flush 0.9 % injection 10 mL  10 mL Intravenous 2 times per day Bernadette Messina MD   10 mL at 12/29/17 2130    sodium chloride flush 0.9 % injection 10 mL  10 mL Intravenous PRN Bernadette Messina MD        acetaminophen (TYLENOL) tablet 650 mg  650 mg Oral Q4H PRN Bernadette Messina MD   650 mg at 12/30/17 0042    docusate sodium (COLACE) capsule 100 mg  100 mg Oral BID Bernadette Messina MD   100 mg at 12/30/17 0818    ondansetron (ZOFRAN) injection 4 mg  4 mg Intravenous Q6H PRN Bernadette Messina MD   4 mg at 12/30/17 0818    enoxaparin (LOVENOX) injection 40 mg  40 mg Subcutaneous Daily Bernadette Messina MD   40 mg at 12/30/17 0818    piperacillin-tazobactam (ZOSYN) 3.375 g in dextrose 50 mL IVPB extended infusion (premix)  3.375 g Intravenous Q8H Bernadette Messina MD   Stopped at 12/30/17 1050    azithromycin (ZITHROMAX) 500 mg in sodium chloride 0.9 % 250 mL IVPB  500 mg Intravenous Q24H Bernadette Messina MD   Stopped at 12/29/17 2338        Labs:     Recent Labs      12/29/17   1500  12/30/17   0147   WBC  15.6*  10.2   RBC  4.30*  4.24*   HGB  10.3*  10.0*   HCT  33.5*  35.1*   MCV  77.9*  82.8   MCH  24.0*  23.6*   MCHC  30.7*  28.5*   PLT  281  242     Recent Labs      12/29/17   1342  12/29/17   1500  12/30/17   0147   NA   --   140  141   K  4.2  4.5  3.9   ANIONGAP   --   15  16   CL   --   100  106   CO2   --   25  19*   BUN   --   37*  33*   CREATININE   --   2.0*  1.2   GLUCOSE   --   121*  86   CALCIUM   --   9.1  8.4*       Recent Labs      12/29/17   1500   AST  27   ALT  16   BILITOT  0.4   ALKPHOS  85     ABGs:  Recent Labs      12/29/17   1342   PHART  7.400   [id number]  33.0*   PO2ART  88.0   [id number]  20.4*   BEART  -3.8*   HGBAE  10.4*   [id number]  95.0   CARBOXHGBART  1.8   02THERAPY  Unknown     CXR 12/29/17  Impression  Shallow inspiration with patchy right subhilar lung infiltrate and possible aspiration pneumonia.    Mild

## 2017-12-30 NOTE — ED NOTES
Report given to [facility].      [address] [facility]  12/29/17 5635

## 2017-12-31 LAB — MRSA CULTURE ONLY: NORMAL

## 2017-12-31 PROCEDURE — 96376 TX/PRO/DX INJ SAME DRUG ADON: CPT

## 2017-12-31 PROCEDURE — 2500000003 HC RX 250 WO HCPCS: Performed by: INTERNAL MEDICINE

## 2017-12-31 PROCEDURE — 6360000002 HC RX W HCPCS: Performed by: INTERNAL MEDICINE

## 2017-12-31 PROCEDURE — 96366 THER/PROPH/DIAG IV INF ADDON: CPT

## 2017-12-31 PROCEDURE — 2580000003 HC RX 258: Performed by: INTERNAL MEDICINE

## 2017-12-31 PROCEDURE — 99232 SBSQ HOSP IP/OBS MODERATE 35: CPT | Performed by: HOSPITALIST

## 2017-12-31 PROCEDURE — 6370000000 HC RX 637 (ALT 250 FOR IP): Performed by: HOSPITALIST

## 2017-12-31 PROCEDURE — 96372 THER/PROPH/DIAG INJ SC/IM: CPT

## 2017-12-31 PROCEDURE — 94664 DEMO&/EVAL PT USE INHALER: CPT

## 2017-12-31 PROCEDURE — 6370000000 HC RX 637 (ALT 250 FOR IP): Performed by: INTERNAL MEDICINE

## 2017-12-31 PROCEDURE — G0378 HOSPITAL OBSERVATION PER HR: HCPCS

## 2017-12-31 PROCEDURE — 94640 AIRWAY INHALATION TREATMENT: CPT

## 2017-12-31 PROCEDURE — 1210000000 HC MED SURG R&B

## 2017-12-31 RX ORDER — QUETIAPINE FUMARATE 25 MG/1
100 TABLET, FILM COATED ORAL 2 TIMES DAILY
Status: DISCONTINUED | OUTPATIENT
Start: 2017-12-31 | End: 2018-01-01

## 2017-12-31 RX ORDER — IPRATROPIUM BROMIDE AND ALBUTEROL SULFATE 2.5; .5 MG/3ML; MG/3ML
1 SOLUTION RESPIRATORY (INHALATION)
Status: DISCONTINUED | OUTPATIENT
Start: 2017-12-31 | End: 2018-01-02 | Stop reason: HOSPADM

## 2017-12-31 RX ORDER — GUAIFENESIN 600 MG/1
600 TABLET, EXTENDED RELEASE ORAL 2 TIMES DAILY
Status: DISCONTINUED | OUTPATIENT
Start: 2017-12-31 | End: 2018-01-02 | Stop reason: HOSPADM

## 2017-12-31 RX ORDER — QUETIAPINE FUMARATE 25 MG/1
100 TABLET, FILM COATED ORAL 2 TIMES DAILY
Status: DISCONTINUED | OUTPATIENT
Start: 2017-12-31 | End: 2017-12-31

## 2017-12-31 RX ADMIN — HYDROCODONE BITARTRATE AND ACETAMINOPHEN 1 TABLET: 10; 325 TABLET ORAL at 08:49

## 2017-12-31 RX ADMIN — QUETIAPINE FUMARATE 100 MG: 25 TABLET ORAL at 20:39

## 2017-12-31 RX ADMIN — GUAIFENESIN 600 MG: 600 TABLET, EXTENDED RELEASE ORAL at 08:49

## 2017-12-31 RX ADMIN — TAZOBACTAM SODIUM AND PIPERACILLIN SODIUM 3.38 G: 375; 3 INJECTION, SOLUTION INTRAVENOUS at 23:32

## 2017-12-31 RX ADMIN — DOCUSATE SODIUM 100 MG: 100 CAPSULE, LIQUID FILLED ORAL at 08:46

## 2017-12-31 RX ADMIN — IPRATROPIUM BROMIDE AND ALBUTEROL SULFATE 1 AMPULE: .5; 3 SOLUTION RESPIRATORY (INHALATION) at 11:39

## 2017-12-31 RX ADMIN — ACETAMINOPHEN 650 MG: 325 TABLET, FILM COATED ORAL at 07:46

## 2017-12-31 RX ADMIN — TAZOBACTAM SODIUM AND PIPERACILLIN SODIUM 3.38 G: 375; 3 INJECTION, SOLUTION INTRAVENOUS at 05:22

## 2017-12-31 RX ADMIN — ONDANSETRON 4 MG: 2 INJECTION INTRAMUSCULAR; INTRAVENOUS at 20:14

## 2017-12-31 RX ADMIN — QUETIAPINE FUMARATE 300 MG: 300 TABLET, FILM COATED ORAL at 08:46

## 2017-12-31 RX ADMIN — DOCUSATE SODIUM 100 MG: 100 CAPSULE, LIQUID FILLED ORAL at 20:39

## 2017-12-31 RX ADMIN — ENOXAPARIN SODIUM 40 MG: 40 INJECTION SUBCUTANEOUS at 08:46

## 2017-12-31 RX ADMIN — SODIUM CHLORIDE: 9 INJECTION, SOLUTION INTRAVENOUS at 20:09

## 2017-12-31 RX ADMIN — HYDROCODONE BITARTRATE AND ACETAMINOPHEN 1 TABLET: 10; 325 TABLET ORAL at 17:25

## 2017-12-31 RX ADMIN — IPRATROPIUM BROMIDE AND ALBUTEROL SULFATE 1 AMPULE: .5; 3 SOLUTION RESPIRATORY (INHALATION) at 15:38

## 2017-12-31 RX ADMIN — ALUMINUM HYDROXIDE, MAGNESIUM HYDROXIDE, AND SIMETHICONE 30 ML: 200; 200; 20 SUSPENSION ORAL at 17:25

## 2017-12-31 RX ADMIN — HYDROCODONE BITARTRATE AND ACETAMINOPHEN 1 TABLET: 10; 325 TABLET ORAL at 01:43

## 2017-12-31 RX ADMIN — LISINOPRIL 40 MG: 20 TABLET ORAL at 08:46

## 2017-12-31 RX ADMIN — GUAIFENESIN 600 MG: 600 TABLET, EXTENDED RELEASE ORAL at 20:39

## 2017-12-31 RX ADMIN — AZITHROMYCIN MONOHYDRATE 500 MG: 500 INJECTION, POWDER, LYOPHILIZED, FOR SOLUTION INTRAVENOUS at 22:31

## 2017-12-31 RX ADMIN — TAZOBACTAM SODIUM AND PIPERACILLIN SODIUM 3.38 G: 375; 3 INJECTION, SOLUTION INTRAVENOUS at 15:37

## 2017-12-31 ASSESSMENT — PAIN SCALES - GENERAL
PAINLEVEL_OUTOF10: 7
PAINLEVEL_OUTOF10: 0
PAINLEVEL_OUTOF10: 8
PAINLEVEL_OUTOF10: 7
PAINLEVEL_OUTOF10: 9

## 2018-01-01 PROCEDURE — 2500000003 HC RX 250 WO HCPCS: Performed by: INTERNAL MEDICINE

## 2018-01-01 PROCEDURE — 6370000000 HC RX 637 (ALT 250 FOR IP): Performed by: HOSPITALIST

## 2018-01-01 PROCEDURE — 1210000000 HC MED SURG R&B

## 2018-01-01 PROCEDURE — 94640 AIRWAY INHALATION TREATMENT: CPT

## 2018-01-01 PROCEDURE — G0378 HOSPITAL OBSERVATION PER HR: HCPCS

## 2018-01-01 PROCEDURE — 2580000003 HC RX 258: Performed by: INTERNAL MEDICINE

## 2018-01-01 PROCEDURE — 96366 THER/PROPH/DIAG IV INF ADDON: CPT

## 2018-01-01 PROCEDURE — 6370000000 HC RX 637 (ALT 250 FOR IP): Performed by: INTERNAL MEDICINE

## 2018-01-01 PROCEDURE — 99225 PR SBSQ OBSERVATION CARE/DAY 25 MINUTES: CPT | Performed by: HOSPITALIST

## 2018-01-01 RX ORDER — QUETIAPINE FUMARATE 25 MG/1
100 TABLET, FILM COATED ORAL NIGHTLY
Status: DISCONTINUED | OUTPATIENT
Start: 2018-01-01 | End: 2018-01-02 | Stop reason: HOSPADM

## 2018-01-01 RX ORDER — LORAZEPAM 0.5 MG/1
0.5 TABLET ORAL EVERY 6 HOURS PRN
Status: DISCONTINUED | OUTPATIENT
Start: 2018-01-01 | End: 2018-01-02 | Stop reason: HOSPADM

## 2018-01-01 RX ORDER — SODIUM BICARBONATE 650 MG/1
650 TABLET ORAL 4 TIMES DAILY
Status: DISCONTINUED | OUTPATIENT
Start: 2018-01-01 | End: 2018-01-02 | Stop reason: HOSPADM

## 2018-01-01 RX ADMIN — QUETIAPINE FUMARATE 100 MG: 25 TABLET ORAL at 19:37

## 2018-01-01 RX ADMIN — HYDROCODONE BITARTRATE AND ACETAMINOPHEN 1 TABLET: 10; 325 TABLET ORAL at 16:53

## 2018-01-01 RX ADMIN — LORAZEPAM 0.5 MG: 0.5 TABLET ORAL at 19:37

## 2018-01-01 RX ADMIN — LISINOPRIL 40 MG: 20 TABLET ORAL at 08:15

## 2018-01-01 RX ADMIN — ALUMINUM HYDROXIDE, MAGNESIUM HYDROXIDE, AND SIMETHICONE 30 ML: 200; 200; 20 SUSPENSION ORAL at 10:23

## 2018-01-01 RX ADMIN — SODIUM BICARBONATE 650 MG: 650 TABLET ORAL at 19:37

## 2018-01-01 RX ADMIN — ALUMINUM HYDROXIDE, MAGNESIUM HYDROXIDE, AND SIMETHICONE 30 ML: 200; 200; 20 SUSPENSION ORAL at 18:03

## 2018-01-01 RX ADMIN — GUAIFENESIN 600 MG: 600 TABLET, EXTENDED RELEASE ORAL at 19:37

## 2018-01-01 RX ADMIN — LORAZEPAM 0.5 MG: 0.5 TABLET ORAL at 13:28

## 2018-01-01 RX ADMIN — IPRATROPIUM BROMIDE AND ALBUTEROL SULFATE 1 AMPULE: .5; 3 SOLUTION RESPIRATORY (INHALATION) at 15:22

## 2018-01-01 RX ADMIN — DOCUSATE SODIUM 100 MG: 100 CAPSULE, LIQUID FILLED ORAL at 19:38

## 2018-01-01 RX ADMIN — QUETIAPINE FUMARATE 100 MG: 25 TABLET ORAL at 08:15

## 2018-01-01 RX ADMIN — TAZOBACTAM SODIUM AND PIPERACILLIN SODIUM 3.38 G: 375; 3 INJECTION, SOLUTION INTRAVENOUS at 16:54

## 2018-01-01 RX ADMIN — HYDROCODONE BITARTRATE AND ACETAMINOPHEN 1 TABLET: 10; 325 TABLET ORAL at 08:20

## 2018-01-01 RX ADMIN — TAZOBACTAM SODIUM AND PIPERACILLIN SODIUM 3.38 G: 375; 3 INJECTION, SOLUTION INTRAVENOUS at 08:14

## 2018-01-01 RX ADMIN — IPRATROPIUM BROMIDE AND ALBUTEROL SULFATE 1 AMPULE: .5; 3 SOLUTION RESPIRATORY (INHALATION) at 11:08

## 2018-01-01 RX ADMIN — GUAIFENESIN 600 MG: 600 TABLET, EXTENDED RELEASE ORAL at 08:15

## 2018-01-01 RX ADMIN — HYDROCODONE BITARTRATE AND ACETAMINOPHEN 1 TABLET: 10; 325 TABLET ORAL at 03:48

## 2018-01-01 RX ADMIN — Medication 10 ML: at 19:40

## 2018-01-01 RX ADMIN — IPRATROPIUM BROMIDE AND ALBUTEROL SULFATE 1 AMPULE: .5; 3 SOLUTION RESPIRATORY (INHALATION) at 07:13

## 2018-01-01 ASSESSMENT — PAIN SCALES - GENERAL
PAINLEVEL_OUTOF10: 8
PAINLEVEL_OUTOF10: 7
PAINLEVEL_OUTOF10: 0
PAINLEVEL_OUTOF10: 5

## 2018-01-01 NOTE — BH NOTE
Psych consult placed on Lindsey Richardson a 56 yr old male  64, who presented to the ED on 2017. Pt states \"I have been struggling with this injury I have. My cranium really hurts from the trauma I had. It cracks and pops & really hurts. I took some Seroquel, I lost count, I took a handful. I wanted to sleep. I just got tired of having these thoughts; I knew it wouldn't kill me. \"  Pt has a 1:1 sitter at bedside. Pt denies current SI, HI & AVH. Psych will continue to follow.

## 2018-01-01 NOTE — PROGRESS NOTES
HOSPITAL MEDICINE  - PROGRESS NOTE    Admit Date: 12/29/2017         CC;  AMS    Subjective: tried to kill himself, tired of being in pain    Objective: no distress    Diet: DIET GENERAL;  Pain is:Mild  Nausea:None  Bowel Movement/Flatus yes    Data:   Scheduled Meds: Reviewed  Current Facility-Administered Medications   Medication Dose Route Frequency Provider Last Rate Last Dose    guaiFENesin (MUCINEX) extended release tablet 600 mg  600 mg Oral BID Dwight Silva MD   600 mg at 12/31/17 0849    ipratropium-albuterol (DUONEB) nebulizer solution 1 ampule  1 ampule Inhalation Q4H WA Dwight Silva MD   1 ampule at 12/31/17 1538    QUEtiapine (SEROQUEL) tablet 100 mg  100 mg Oral BID Dwight Silva MD        aluminum & magnesium hydroxide-simethicone (MAALOX) 336-282-73 MG/5ML suspension 30 mL  30 mL Oral Q6H PRN Ajay Nicholas MD   30 mL at 12/31/17 1725    HYDROcodone-acetaminophen (Lei Tyrese)  MG per tablet 1 tablet  1 tablet Oral TID PRN Ajay Nicholas MD   1 tablet at 12/31/17 1725    lisinopril (PRINIVIL;ZESTRIL) tablet 40 mg  40 mg Oral Daily Ajay Nicholas MD   40 mg at 12/31/17 0846    0.9 % sodium chloride infusion   Intravenous Continuous Kaitlynn Odonnell  mL/hr at 12/30/17 2127      sodium chloride flush 0.9 % injection 10 mL  10 mL Intravenous 2 times per day Kaitlynn Odonnell MD   10 mL at 12/29/17 2130    sodium chloride flush 0.9 % injection 10 mL  10 mL Intravenous PRN Kaitlynn Odonnell MD        acetaminophen (TYLENOL) tablet 650 mg  650 mg Oral Q4H PRN Kaitlynn Odonnell MD   650 mg at 12/31/17 0746    docusate sodium (COLACE) capsule 100 mg  100 mg Oral BID Kaitlynn Odonnell MD   100 mg at 12/31/17 0846    ondansetron (ZOFRAN) injection 4 mg  4 mg Intravenous Q6H PRN Kaitlynn Odonnell MD   4 mg at 12/30/17 1604    enoxaparin (LOVENOX) injection 40 mg  40 mg Subcutaneous Daily Shankar Martinez MD   40 mg at 12/31/17 0846    piperacillin-tazobactam (ZOSYN) 3.375 g in dextrose 50 mL IVPB extended infusion (premix)  3.375 g Intravenous Q8H Shankar Martinez MD 12.5 mL/hr at 12/31/17 1537 3.375 g at 12/31/17 1537    azithromycin (ZITHROMAX) 500 mg in sodium chloride 0.9 % 250 mL IVPB  500 mg Intravenous Q24H Shankar Martinez MD   Stopped at 12/30/17 2232     DVT Prophylaxis: Lovenox 40 mg sq daily    Continuous Infusions:   sodium chloride 100 mL/hr at 12/30/17 2127       CBC:   Recent Labs      12/29/17   1500  12/30/17   0147   WBC  15.6*  10.2   HGB  10.3*  10.0*   PLT  281  242     BMP:  Recent Labs      12/29/17   1342  12/29/17   1500  12/30/17   0147   NA   --   140  141   K  4.2  4.5  3.9   CL   --   100  106   CO2   --   25  19*   BUN   --   37*  33*   CREATININE   --   2.0*  1.2   GLUCOSE   --   121*  86     ABGs: Lab Results   Component Value Date    PHART 7.400 12/29/2017    PO2ART 88.0 12/29/2017    YBG3WKN 33.0 12/29/2017         Objective:   Vitals: /85   Pulse 82   Temp 99.2 °F (37.3 °C) (Temporal)   Resp 20   Ht 5' 10\" (1.778 m)   Wt 199 lb 14.4 oz (90.7 kg)   SpO2 95%   BMI 28.68 kg/m²   General appearance: alert, appears stated age and cooperative  Skin: Skin color, texture, turgor normal.   HEENT: Head: Normocephalic, no lesions, without obvious abnormality.   Neck: no adenopathy, no carotid bruit, no JVD and supple, symmetrical, trachea midline  Lungs: clear to auscultation bilaterally  Heart: regular rate and rhythm, S1, S2 normal, no murmur, click, rub or gallop  Abdomen: soft, non-tender; bowel sounds normal; no masses,  no organomegaly  Extremities: extremities normal, atraumatic, no cyanosis or edema  Lymphatic: No significant lymph node enlargement papable  Neurologic: Mental status: Alert, oriented, thought content appropriate        Assessment & Plan:      SA- psych consult   Overdose -with the bottle of seroquel   Pneumonia, aspiration -ab   Leukocytosis -resolved    CHRIS- resolved   Toxic encephalopathy - resolved  Hypertension - controlled     Cleared for DC  Needs psych clearance   Ely Do

## 2018-01-02 VITALS
DIASTOLIC BLOOD PRESSURE: 98 MMHG | BODY MASS INDEX: 28.62 KG/M2 | OXYGEN SATURATION: 97 % | HEIGHT: 70 IN | WEIGHT: 199.9 LBS | RESPIRATION RATE: 20 BRPM | SYSTOLIC BLOOD PRESSURE: 148 MMHG | TEMPERATURE: 98.7 F | HEART RATE: 82 BPM

## 2018-01-02 PROBLEM — T50.901A OVERDOSE DRUG, INITIAL ENCOUNTER: Status: ACTIVE | Noted: 2018-01-02

## 2018-01-02 PROBLEM — T50.902A INTENTIONAL DRUG OVERDOSE (HCC): Status: ACTIVE | Noted: 2017-12-30

## 2018-01-02 LAB
EKG P AXIS: 45 DEGREES
EKG P-R INTERVAL: 144 MS
EKG Q-T INTERVAL: 336 MS
EKG QRS DURATION: 94 MS
EKG QTC CALCULATION (BAZETT): 428 MS
EKG T AXIS: 70 DEGREES

## 2018-01-02 PROCEDURE — G0378 HOSPITAL OBSERVATION PER HR: HCPCS

## 2018-01-02 PROCEDURE — 6370000000 HC RX 637 (ALT 250 FOR IP): Performed by: HOSPITALIST

## 2018-01-02 PROCEDURE — 99217 PR OBSERVATION CARE DISCHARGE MANAGEMENT: CPT | Performed by: HOSPITALIST

## 2018-01-02 PROCEDURE — 6360000002 HC RX W HCPCS: Performed by: INTERNAL MEDICINE

## 2018-01-02 PROCEDURE — 99999 PR OFFICE/OUTPT VISIT,PROCEDURE ONLY: CPT | Performed by: PSYCHIATRY & NEUROLOGY

## 2018-01-02 PROCEDURE — 2580000003 HC RX 258: Performed by: INTERNAL MEDICINE

## 2018-01-02 PROCEDURE — 6370000000 HC RX 637 (ALT 250 FOR IP): Performed by: INTERNAL MEDICINE

## 2018-01-02 PROCEDURE — 94640 AIRWAY INHALATION TREATMENT: CPT

## 2018-01-02 RX ORDER — AMOXICILLIN AND CLAVULANATE POTASSIUM 875; 125 MG/1; MG/1
1 TABLET, FILM COATED ORAL 2 TIMES DAILY
Qty: 10 TABLET | Refills: 0 | Status: SHIPPED | OUTPATIENT
Start: 2018-01-02 | End: 2018-01-07

## 2018-01-02 RX ORDER — AZITHROMYCIN 250 MG/1
500 TABLET, FILM COATED ORAL DAILY
Status: DISCONTINUED | OUTPATIENT
Start: 2018-01-02 | End: 2018-01-02 | Stop reason: HOSPADM

## 2018-01-02 RX ADMIN — ALUMINUM HYDROXIDE, MAGNESIUM HYDROXIDE, AND SIMETHICONE 30 ML: 200; 200; 20 SUSPENSION ORAL at 00:31

## 2018-01-02 RX ADMIN — IPRATROPIUM BROMIDE AND ALBUTEROL SULFATE 1 AMPULE: .5; 3 SOLUTION RESPIRATORY (INHALATION) at 07:09

## 2018-01-02 RX ADMIN — GUAIFENESIN 600 MG: 600 TABLET, EXTENDED RELEASE ORAL at 08:43

## 2018-01-02 RX ADMIN — HYDROCODONE BITARTRATE AND ACETAMINOPHEN 1 TABLET: 10; 325 TABLET ORAL at 14:19

## 2018-01-02 RX ADMIN — SODIUM BICARBONATE 650 MG: 650 TABLET ORAL at 16:12

## 2018-01-02 RX ADMIN — HYDROCODONE BITARTRATE AND ACETAMINOPHEN 1 TABLET: 10; 325 TABLET ORAL at 00:27

## 2018-01-02 RX ADMIN — ACETAMINOPHEN 650 MG: 325 TABLET, FILM COATED ORAL at 03:29

## 2018-01-02 RX ADMIN — LORAZEPAM 0.5 MG: 0.5 TABLET ORAL at 03:29

## 2018-01-02 RX ADMIN — LORAZEPAM 0.5 MG: 0.5 TABLET ORAL at 09:42

## 2018-01-02 RX ADMIN — Medication 10 ML: at 08:44

## 2018-01-02 RX ADMIN — SODIUM BICARBONATE 650 MG: 650 TABLET ORAL at 08:44

## 2018-01-02 RX ADMIN — SODIUM BICARBONATE 650 MG: 650 TABLET ORAL at 12:55

## 2018-01-02 RX ADMIN — AZITHROMYCIN 500 MG: 250 TABLET, FILM COATED ORAL at 12:57

## 2018-01-02 RX ADMIN — HYDROCODONE BITARTRATE AND ACETAMINOPHEN 1 TABLET: 10; 325 TABLET ORAL at 08:44

## 2018-01-02 RX ADMIN — ALUMINUM HYDROXIDE, MAGNESIUM HYDROXIDE, AND SIMETHICONE 30 ML: 200; 200; 20 SUSPENSION ORAL at 16:12

## 2018-01-02 RX ADMIN — LISINOPRIL 40 MG: 20 TABLET ORAL at 08:44

## 2018-01-02 RX ADMIN — DOCUSATE SODIUM 100 MG: 100 CAPSULE, LIQUID FILLED ORAL at 08:43

## 2018-01-02 RX ADMIN — LORAZEPAM 0.5 MG: 0.5 TABLET ORAL at 16:12

## 2018-01-02 ASSESSMENT — PAIN SCALES - GENERAL
PAINLEVEL_OUTOF10: 4
PAINLEVEL_OUTOF10: 8
PAINLEVEL_OUTOF10: 7
PAINLEVEL_OUTOF10: 8

## 2018-01-02 NOTE — DISCHARGE SUMMARY
Physician Discharge Summary     Patient ID:  Angela Colunga  [id number]  81 y.o.  1/1/1948    Admit date: 12/29/2017    Discharge date and time: 1/2/2018    Admitting Physician: [employer], MD     Discharge Physician: [employer] MD    Discharge Diagnoses:     Questionable suicide attempt - psychiatry cleared for DC  Overdose on Seroquel   Pneumonia- treated   Leukocytosis -resolved    CHRIS- resolved   Toxic encephalopathy - resolved  Hypertension    Discharged Condition: good    Indication for Admission: [address] Course:     81 y.o. male presented with Altered Mental Status with suspected drug overdose. He partially responded to narcan and then became lethargic again. He was in ICU. When his AMS resolved he was transferred to the medical floor. Apparently he overdosed on Seroquel. Per psychiatry note at this point he is not suicidal. He denies depression, there is no evidence of psychosis. The patient at this point does not want to be admitted to psychiatry. He denies suicidal ideas intentions or plans. He says that every admission to psychiatry actually makes him feel worse. His mother wishes to help him obtain outpatient psychiatric help with [address] [place] [address] and with outpatient neurology. Per second opinion,  Dr. Fermín Morales who thinks that if the patient is not voluntary, and the family doesn't support a commitment to a psychiatric unit at this point, and the patient is not depressed or psychotic, that he would not be meeting criteria for an involuntary psychiatric admission. \"Recommendations:   1. Patient does not want inpatient psychiatric help and current thought processes do not support inpatient care. Also, patient is unlikely to reduce his risk by involuntary hospitalization, especially since his mother. Who is primary help does not feel it is justified. Patient states he will keep his appointment tomorrow morning at [facility] and bro-in-law has agreed to take him.  Reminded patient that [facility] has dual diagnosis intensive outpatient program that his treatment team strongly recommends. 2. Offered referral to rehab inpat facility and he again declines. 3. Expressed again to patient that there is help available for both pain and substance dependence that does not cause respiratory depression. Reiterated that we are not questioning his pain. We cannot provider any cross-addictive substances due to his respiratory status and history of ARDS with trach. 4. Told patient that he can be admitted very briefly for unmanageable relapse or SI crisis if necessary. 5. Patient and mother both understand that combination of serious mood episodes, coping strategies, addiction, and severe respiratory issues place him at high risk but neither wants him hospitalized on psychiatry today. 7. Patient has no more Seroquel and is advised not to take any. 8. Will be seen at [facility] this week for therapy and med  Management. 9. Has list of rehab programs. \"    Consults: psychiatry times 2    Significant Diagnostic Studies:     CT Head WO Contrast [[id number]] Resulted: 12/29/17 1532      Order Status: Completed Updated: 12/29/17 1434     Narrative:       EXAMINATION:  CT HEAD WO CONTRAST  12/29/2017 2:30 PM  HISTORY: Mental status change. TECHNIQUE: Multiple axial images were obtained through the brain  without contrast infusion. Multiplanar images were reconstructed. DLP: 3522 mGy-cm. Automated exposure control was utilized. COMPARISON: No comparison study. FINDINGS: There are no hemorrhage, edema or mass effect. There is a  small chronic appearing infarct in the left occipital lobe. The  ventricular system is nondilated. The visualized paranasal sinuses and  mastoid air cells are clear. No calvarial fracture is seen.     Impression:       1. No hemorrhage, edema or mass effect. 2. Chronic appearing infarct in the left occipital lobe. The full report of this exam was immediately signed and available to  the emergency room.  The patient is currently in the emergency room. Signed by Dr Jennie Lewis on 12/29/2017 2:32 PM     XR CHEST PORTABLE [[id number]] Resulted: 12/29/17 1501     Order Status: Completed Updated: 12/29/17 1403     Narrative:       EXAMINATION: XR CHEST PORTABLE 12/29/2017 1:59 PM  HISTORY: Overdose. Report: There are no comparison studies. Lungs are hypoaerated, there is mild central vascular crowding and  there is patchy basilar atelectasis. There is small infiltrate in the  right subhilar region, early pneumonia related to aspiration is not  excluded. No pneumothorax or pleural effusion is identified. Heart  size is normal. There is ectasia of the thoracic aorta. The osseous  structures are unremarkable.     Impression:       Shallow inspiration with patchy right subhilar lung  infiltrate and possible aspiration pneumonia. Mild central vascular  crowding without overt CHF. Signed by Dr Gypsy Mcrae on 12/29/2017 2:01 PM             Discharge Exam:  BP (!) 148/98   Pulse 82   Temp 98.7 °F (37.1 °C) (Temporal)   Resp 20   Ht 5' 10\" (1.778 m)   Wt 199 lb 14.4 oz (90.7 kg)   SpO2 97%   BMI 28.68 kg/m²     General appearance: alert, appears stated age and cooperative  Skin: Skin color, texture, turgor normal.   HEENT: Head: Normocephalic, no lesions, without obvious abnormality.   Neck: no adenopathy, no carotid bruit, no JVD and supple, symmetrical, trachea midline  Lungs: clear to auscultation bilaterally  Heart: regular rate and rhythm, S1, S2 normal, no murmur, click, rub or gallop  Abdomen: soft, non-tender; bowel sounds normal; no masses,  no organomegaly  Extremities: extremities normal, atraumatic, no cyanosis or edema  Lymphatic: No significant lymph node enlargement papable  Neurologic: Mental status: Alert, oriented, thought content appropriate       Discharge Medications:       Catrina Bassett Medication Instructions RBK:[id number]    Printed on:01/02/18 6890   Medication Information

## 2018-01-02 NOTE — CONSULTS
declines. 3. Expressed again to patient that there is help available for both pain and substance dependence that does not cause respiratory depression. Reiterated that we are not questioning his pain. We cannot provider any cross-addictive substances due to his respiratory status and history of ARDS with trach. 4. Told patient that he can be admitted very briefly for unmanageable relapse or SI crisis if necessary. 5. Patient and mother both understand that combination of serious mood episodes, coping strategies, addiction, and severe respiratory issues place him at high risk but neither wants him hospitalized on psychiatry today. 7. Patient has no more Seroquel and is advised not to take any. 8. Will be seen at [facility] this week for therapy and med  Management. 9. Has list of rehab programs.   10. No longer needs bedside sitter for suicide risk  Thanks for referral.  Vaishnavi Mauricio MD  Staff Psychiatrist

## 2018-01-03 LAB — BLOOD CULTURE, ROUTINE: NORMAL

## 2018-01-04 LAB — CULTURE, BLOOD 2: NORMAL

## 2018-01-09 ENCOUNTER — TELEPHONE (OUTPATIENT)
Dept: NEUROLOGY | Age: 58
End: 2018-01-09

## 2018-01-09 NOTE — TELEPHONE ENCOUNTER
Talked to patients mother. She stated that he no longer lives with her and he is at a facility.  She stated she would relay the date and time of the appt to the one that would be bring him. randy

## 2020-11-03 PROBLEM — I10 ESSENTIAL HYPERTENSION: Status: RESOLVED | Noted: 2017-08-22 | Resolved: 2020-11-03
